# Patient Record
Sex: MALE | Race: WHITE | NOT HISPANIC OR LATINO | Employment: FULL TIME | URBAN - METROPOLITAN AREA
[De-identification: names, ages, dates, MRNs, and addresses within clinical notes are randomized per-mention and may not be internally consistent; named-entity substitution may affect disease eponyms.]

---

## 2017-04-03 ENCOUNTER — GENERIC CONVERSION - ENCOUNTER (OUTPATIENT)
Dept: OTHER | Facility: OTHER | Age: 47
End: 2017-04-03

## 2017-04-25 ENCOUNTER — GENERIC CONVERSION - ENCOUNTER (OUTPATIENT)
Dept: OTHER | Facility: OTHER | Age: 47
End: 2017-04-25

## 2017-05-09 DIAGNOSIS — Z00.00 ENCOUNTER FOR GENERAL ADULT MEDICAL EXAMINATION WITHOUT ABNORMAL FINDINGS: ICD-10-CM

## 2017-05-10 ENCOUNTER — APPOINTMENT (OUTPATIENT)
Dept: LAB | Age: 47
End: 2017-05-10
Payer: COMMERCIAL

## 2017-05-10 ENCOUNTER — TRANSCRIBE ORDERS (OUTPATIENT)
Dept: ADMINISTRATIVE | Age: 47
End: 2017-05-10

## 2017-05-10 DIAGNOSIS — C82.48 FOLLICULAR LYMPHOMA GRADE IIIB OF LYMPH NODES OF MULTIPLE SITES (HCC): Primary | ICD-10-CM

## 2017-05-10 DIAGNOSIS — Z00.00 ENCOUNTER FOR GENERAL ADULT MEDICAL EXAMINATION WITHOUT ABNORMAL FINDINGS: ICD-10-CM

## 2017-05-10 LAB
BASOPHILS # BLD AUTO: 0.04 THOUSANDS/ΜL (ref 0–0.1)
BASOPHILS NFR BLD AUTO: 1 % (ref 0–1)
EOSINOPHIL # BLD AUTO: 0.13 THOUSAND/ΜL (ref 0–0.61)
EOSINOPHIL NFR BLD AUTO: 2 % (ref 0–6)
ERYTHROCYTE [DISTWIDTH] IN BLOOD BY AUTOMATED COUNT: 13.8 % (ref 11.6–15.1)
HCT VFR BLD AUTO: 43.4 % (ref 36.5–49.3)
HGB BLD-MCNC: 14.6 G/DL (ref 12–17)
IGA SERPL-MCNC: 178 MG/DL (ref 70–400)
IGG SERPL-MCNC: 1280 MG/DL (ref 700–1600)
IGM SERPL-MCNC: 51 MG/DL (ref 40–230)
LDH SERPL-CCNC: 166 U/L (ref 81–234)
LYMPHOCYTES # BLD AUTO: 2.21 THOUSANDS/ΜL (ref 0.6–4.47)
LYMPHOCYTES NFR BLD AUTO: 33 % (ref 14–44)
MCH RBC QN AUTO: 28.6 PG (ref 26.8–34.3)
MCHC RBC AUTO-ENTMCNC: 33.6 G/DL (ref 31.4–37.4)
MCV RBC AUTO: 85 FL (ref 82–98)
MONOCYTES # BLD AUTO: 0.85 THOUSAND/ΜL (ref 0.17–1.22)
MONOCYTES NFR BLD AUTO: 13 % (ref 4–12)
NEUTROPHILS # BLD AUTO: 3.36 THOUSANDS/ΜL (ref 1.85–7.62)
NEUTS SEG NFR BLD AUTO: 51 % (ref 43–75)
NRBC BLD AUTO-RTO: 0 /100 WBCS
PLATELET # BLD AUTO: 143 THOUSANDS/UL (ref 149–390)
PMV BLD AUTO: 10.8 FL (ref 8.9–12.7)
RBC # BLD AUTO: 5.11 MILLION/UL (ref 3.88–5.62)
WBC # BLD AUTO: 6.62 THOUSAND/UL (ref 4.31–10.16)

## 2017-05-10 PROCEDURE — 83615 LACTATE (LD) (LDH) ENZYME: CPT

## 2017-05-10 PROCEDURE — 85025 COMPLETE CBC W/AUTO DIFF WBC: CPT

## 2017-05-10 PROCEDURE — 84165 PROTEIN E-PHORESIS SERUM: CPT

## 2017-05-10 PROCEDURE — 83883 ASSAY NEPHELOMETRY NOT SPEC: CPT

## 2017-05-10 PROCEDURE — 36415 COLL VENOUS BLD VENIPUNCTURE: CPT

## 2017-05-10 PROCEDURE — 82784 ASSAY IGA/IGD/IGG/IGM EACH: CPT

## 2017-05-12 LAB
ALBUMIN SERPL ELPH-MCNC: 4.37 G/DL (ref 3.5–5)
ALBUMIN SERPL ELPH-MCNC: 59 % (ref 52–65)
ALPHA1 GLOB SERPL ELPH-MCNC: 0.41 G/DL (ref 0.1–0.4)
ALPHA1 GLOB SERPL ELPH-MCNC: 5.5 % (ref 2.5–5)
ALPHA2 GLOB SERPL ELPH-MCNC: 0.63 G/DL (ref 0.4–1.2)
ALPHA2 GLOB SERPL ELPH-MCNC: 8.5 % (ref 7–13)
BETA GLOB ABNORMAL SERPL ELPH-MCNC: 0.48 G/DL (ref 0.4–0.8)
BETA1 GLOB SERPL ELPH-MCNC: 6.5 % (ref 5–13)
BETA2 GLOB SERPL ELPH-MCNC: 4.8 % (ref 2–8)
BETA2+GAMMA GLOB SERPL ELPH-MCNC: 0.36 G/DL (ref 0.2–0.5)
GAMMA GLOB ABNORMAL SERPL ELPH-MCNC: 1.16 G/DL (ref 0.5–1.6)
GAMMA GLOB SERPL ELPH-MCNC: 15.7 % (ref 12–22)
IGG/ALB SER: 1.44 {RATIO} (ref 1.1–1.8)
KAPPA LC FREE SER-MCNC: 78.58 MG/L (ref 3.3–19.4)
KAPPA LC FREE/LAMBDA FREE SER: 4.45 {RATIO} (ref 0.26–1.65)
LAMBDA LC FREE SERPL-MCNC: 17.65 MG/L (ref 5.71–26.3)
PROT PATTERN SERPL ELPH-IMP: ABNORMAL
PROT SERPL-MCNC: 7.4 G/DL (ref 6.4–8.2)

## 2017-05-16 ENCOUNTER — GENERIC CONVERSION - ENCOUNTER (OUTPATIENT)
Dept: OTHER | Facility: OTHER | Age: 47
End: 2017-05-16

## 2017-05-18 ENCOUNTER — HOSPITAL ENCOUNTER (OUTPATIENT)
Dept: CT IMAGING | Facility: HOSPITAL | Age: 47
Discharge: HOME | End: 2017-05-18
Attending: INTERNAL MEDICINE
Payer: COMMERCIAL

## 2017-05-18 VITALS
OXYGEN SATURATION: 95 % | HEART RATE: 80 BPM | DIASTOLIC BLOOD PRESSURE: 76 MMHG | RESPIRATION RATE: 18 BRPM | HEIGHT: 72 IN | BODY MASS INDEX: 28.17 KG/M2 | WEIGHT: 208 LBS | SYSTOLIC BLOOD PRESSURE: 121 MMHG | TEMPERATURE: 98.1 F

## 2017-05-18 DIAGNOSIS — C85.90 NON HODGKIN'S LYMPHOMA (HCC): ICD-10-CM

## 2017-05-18 PROCEDURE — 88185 FLOWCYTOMETRY/TC ADD-ON: CPT

## 2017-05-18 PROCEDURE — 88342 IMHCHEM/IMCYTCHM 1ST ANTB: CPT | Performed by: INTERNAL MEDICINE

## 2017-05-18 PROCEDURE — 88368 INSITU HYBRIDIZATION MANUAL: CPT | Performed by: INTERNAL MEDICINE

## 2017-05-18 PROCEDURE — 88184 FLOWCYTOMETRY/ TC 1 MARKER: CPT | Performed by: PATHOLOGY

## 2017-05-18 PROCEDURE — 99153 MOD SED SAME PHYS/QHP EA: CPT

## 2017-05-18 PROCEDURE — 49180 BIOPSY ABDOMINAL MASS: CPT

## 2017-05-18 PROCEDURE — 88305 TISSUE EXAM BY PATHOLOGIST: CPT | Performed by: INTERNAL MEDICINE

## 2017-05-18 PROCEDURE — 88341 IMHCHEM/IMCYTCHM EA ADD ANTB: CPT | Performed by: INTERNAL MEDICINE

## 2017-05-18 PROCEDURE — 88365 INSITU HYBRIDIZATION (FISH): CPT | Performed by: INTERNAL MEDICINE

## 2017-05-18 PROCEDURE — 88333 PATH CONSLTJ SURG CYTO XM 1: CPT | Performed by: INTERNAL MEDICINE

## 2017-05-18 PROCEDURE — 99152 MOD SED SAME PHYS/QHP 5/>YRS: CPT

## 2017-05-18 RX ORDER — FENTANYL CITRATE 50 UG/ML
INJECTION, SOLUTION INTRAMUSCULAR; INTRAVENOUS CODE/TRAUMA/SEDATION MEDICATION
Status: COMPLETED | OUTPATIENT
Start: 2017-05-18 | End: 2017-05-18

## 2017-05-18 RX ORDER — SODIUM CHLORIDE 9 MG/ML
75 INJECTION, SOLUTION INTRAVENOUS CONTINUOUS
Status: DISCONTINUED | OUTPATIENT
Start: 2017-05-18 | End: 2017-05-19 | Stop reason: HOSPADM

## 2017-05-18 RX ORDER — MIDAZOLAM HYDROCHLORIDE 1 MG/ML
INJECTION INTRAMUSCULAR; INTRAVENOUS CODE/TRAUMA/SEDATION MEDICATION
Status: COMPLETED | OUTPATIENT
Start: 2017-05-18 | End: 2017-05-18

## 2017-05-18 RX ADMIN — SODIUM CHLORIDE 75 ML/HR: 0.9 INJECTION, SOLUTION INTRAVENOUS at 12:15

## 2017-05-18 RX ADMIN — FENTANYL CITRATE 50 MCG: 50 INJECTION INTRAMUSCULAR; INTRAVENOUS at 14:09

## 2017-05-18 RX ADMIN — MIDAZOLAM 1 MG: 1 INJECTION INTRAMUSCULAR; INTRAVENOUS at 14:08

## 2017-05-19 ENCOUNTER — GENERIC CONVERSION - ENCOUNTER (OUTPATIENT)
Dept: OTHER | Facility: OTHER | Age: 47
End: 2017-05-19

## 2017-05-22 LAB — SCAN RESULT: NORMAL

## 2017-05-23 ENCOUNTER — GENERIC CONVERSION - ENCOUNTER (OUTPATIENT)
Dept: OTHER | Facility: OTHER | Age: 47
End: 2017-05-23

## 2017-05-24 ENCOUNTER — GENERIC CONVERSION - ENCOUNTER (OUTPATIENT)
Dept: OTHER | Facility: OTHER | Age: 47
End: 2017-05-24

## 2017-05-26 ENCOUNTER — GENERIC CONVERSION - ENCOUNTER (OUTPATIENT)
Dept: OTHER | Facility: OTHER | Age: 47
End: 2017-05-26

## 2017-05-30 ENCOUNTER — ALLSCRIPTS OFFICE VISIT (OUTPATIENT)
Dept: OTHER | Facility: OTHER | Age: 47
End: 2017-05-30

## 2017-05-31 ENCOUNTER — GENERIC CONVERSION - ENCOUNTER (OUTPATIENT)
Dept: OTHER | Facility: OTHER | Age: 47
End: 2017-05-31

## 2017-06-02 ENCOUNTER — LAB REQUISITION (OUTPATIENT)
Dept: LAB | Facility: HOSPITAL | Age: 47
End: 2017-06-02
Payer: COMMERCIAL

## 2017-06-02 ENCOUNTER — ALLSCRIPTS OFFICE VISIT (OUTPATIENT)
Dept: OTHER | Facility: OTHER | Age: 47
End: 2017-06-02

## 2017-06-02 DIAGNOSIS — L73.9 FOLLICULAR DISORDER: ICD-10-CM

## 2017-06-02 PROCEDURE — 88342 IMHCHEM/IMCYTCHM 1ST ANTB: CPT | Performed by: INTERNAL MEDICINE

## 2017-06-02 PROCEDURE — 88313 SPECIAL STAINS GROUP 2: CPT | Performed by: INTERNAL MEDICINE

## 2017-06-02 PROCEDURE — 88305 TISSUE EXAM BY PATHOLOGIST: CPT | Performed by: INTERNAL MEDICINE

## 2017-06-02 PROCEDURE — 85097 BONE MARROW INTERPRETATION: CPT | Performed by: INTERNAL MEDICINE

## 2017-06-02 PROCEDURE — 88341 IMHCHEM/IMCYTCHM EA ADD ANTB: CPT | Performed by: INTERNAL MEDICINE

## 2017-06-02 PROCEDURE — 88365 INSITU HYBRIDIZATION (FISH): CPT | Performed by: INTERNAL MEDICINE

## 2017-06-02 PROCEDURE — 88311 DECALCIFY TISSUE: CPT | Performed by: INTERNAL MEDICINE

## 2017-06-06 ENCOUNTER — HOSPITAL ENCOUNTER (OUTPATIENT)
Dept: RADIOLOGY | Age: 47
Discharge: HOME | End: 2017-06-06
Payer: COMMERCIAL

## 2017-06-06 DIAGNOSIS — C82.48 FOLLICULAR LYMPHOMA GRADE IIIB OF LYMPH NODES OF MULTIPLE SITES (HCC): ICD-10-CM

## 2017-06-06 LAB — GLUCOSE SERPL-MCNC: 80 MG/DL (ref 65–140)

## 2017-06-06 PROCEDURE — 82948 REAGENT STRIP/BLOOD GLUCOSE: CPT

## 2017-06-06 PROCEDURE — A9552 F18 FDG: HCPCS

## 2017-06-06 PROCEDURE — 78815 PET IMAGE W/CT SKULL-THIGH: CPT

## 2017-06-06 RX ADMIN — IOHEXOL 5 ML: 240 INJECTION, SOLUTION INTRATHECAL; INTRAVASCULAR; INTRAVENOUS; ORAL at 13:19

## 2017-06-07 ENCOUNTER — HOSPITAL ENCOUNTER (OUTPATIENT)
Dept: NON INVASIVE DIAGNOSTICS | Facility: HOSPITAL | Age: 47
Discharge: HOME | End: 2017-06-07
Attending: INTERNAL MEDICINE
Payer: COMMERCIAL

## 2017-06-07 DIAGNOSIS — C82.48 FOLLICULAR LYMPHOMA GRADE IIIB OF LYMPH NODES OF MULTIPLE SITES (HCC): ICD-10-CM

## 2017-06-07 PROCEDURE — 93306 TTE W/DOPPLER COMPLETE: CPT

## 2017-06-08 ENCOUNTER — GENERIC CONVERSION - ENCOUNTER (OUTPATIENT)
Dept: OTHER | Facility: OTHER | Age: 47
End: 2017-06-08

## 2017-06-09 ENCOUNTER — APPOINTMENT (OUTPATIENT)
Dept: LAB | Facility: MEDICAL CENTER | Age: 47
End: 2017-06-09
Payer: COMMERCIAL

## 2017-06-09 ENCOUNTER — TRANSCRIBE ORDERS (OUTPATIENT)
Dept: ADMINISTRATIVE | Facility: HOSPITAL | Age: 47
End: 2017-06-09

## 2017-06-09 DIAGNOSIS — C82.48 FOLLICULAR LYMPHOMA GRADE IIIB OF LYMPH NODES OF MULTIPLE SITES (HCC): ICD-10-CM

## 2017-06-09 LAB
ALBUMIN SERPL BCP-MCNC: 3.9 G/DL (ref 3.5–5)
ALP SERPL-CCNC: 177 U/L (ref 46–116)
ALT SERPL W P-5'-P-CCNC: 48 U/L (ref 12–78)
ANION GAP SERPL CALCULATED.3IONS-SCNC: 9 MMOL/L (ref 4–13)
AST SERPL W P-5'-P-CCNC: 33 U/L (ref 5–45)
BASOPHILS # BLD AUTO: 0.02 THOUSANDS/ΜL (ref 0–0.1)
BASOPHILS NFR BLD AUTO: 0 % (ref 0–1)
BILIRUB SERPL-MCNC: 0.63 MG/DL (ref 0.2–1)
BUN SERPL-MCNC: 15 MG/DL (ref 5–25)
CALCIUM SERPL-MCNC: 9.4 MG/DL (ref 8.3–10.1)
CHLORIDE SERPL-SCNC: 106 MMOL/L (ref 100–108)
CO2 SERPL-SCNC: 26 MMOL/L (ref 21–32)
CREAT SERPL-MCNC: 0.98 MG/DL (ref 0.6–1.3)
EOSINOPHIL # BLD AUTO: 0.01 THOUSAND/ΜL (ref 0–0.61)
EOSINOPHIL NFR BLD AUTO: 0 % (ref 0–6)
ERYTHROCYTE [DISTWIDTH] IN BLOOD BY AUTOMATED COUNT: 13.7 % (ref 11.6–15.1)
GFR SERPL CREATININE-BSD FRML MDRD: >60 ML/MIN/1.73SQ M
GLUCOSE SERPL-MCNC: 143 MG/DL (ref 65–140)
HCT VFR BLD AUTO: 43.1 % (ref 36.5–49.3)
HGB BLD-MCNC: 14.7 G/DL (ref 12–17)
LYMPHOCYTES # BLD AUTO: 2.1 THOUSANDS/ΜL (ref 0.6–4.47)
LYMPHOCYTES NFR BLD AUTO: 28 % (ref 14–44)
MCH RBC QN AUTO: 28.5 PG (ref 26.8–34.3)
MCHC RBC AUTO-ENTMCNC: 34.1 G/DL (ref 31.4–37.4)
MCV RBC AUTO: 84 FL (ref 82–98)
MONOCYTES # BLD AUTO: 0.56 THOUSAND/ΜL (ref 0.17–1.22)
MONOCYTES NFR BLD AUTO: 8 % (ref 4–12)
NEUTROPHILS # BLD AUTO: 4.78 THOUSANDS/ΜL (ref 1.85–7.62)
NEUTS SEG NFR BLD AUTO: 64 % (ref 43–75)
NRBC BLD AUTO-RTO: 0 /100 WBCS
PLATELET # BLD AUTO: 172 THOUSANDS/UL (ref 149–390)
PMV BLD AUTO: 10.9 FL (ref 8.9–12.7)
POTASSIUM SERPL-SCNC: 4.3 MMOL/L (ref 3.5–5.3)
PROT SERPL-MCNC: 7.8 G/DL (ref 6.4–8.2)
RBC # BLD AUTO: 5.15 MILLION/UL (ref 3.88–5.62)
SODIUM SERPL-SCNC: 141 MMOL/L (ref 136–145)
WBC # BLD AUTO: 7.5 THOUSAND/UL (ref 4.31–10.16)

## 2017-06-09 PROCEDURE — 80053 COMPREHEN METABOLIC PANEL: CPT

## 2017-06-09 PROCEDURE — 85025 COMPLETE CBC W/AUTO DIFF WBC: CPT

## 2017-06-09 PROCEDURE — 36415 COLL VENOUS BLD VENIPUNCTURE: CPT

## 2017-06-09 RX ORDER — SODIUM CHLORIDE 9 MG/ML
20 INJECTION, SOLUTION INTRAVENOUS CONTINUOUS
Status: DISCONTINUED | OUTPATIENT
Start: 2017-06-12 | End: 2017-06-15 | Stop reason: HOSPADM

## 2017-06-09 RX ORDER — ACETAMINOPHEN 325 MG/1
650 TABLET ORAL ONCE
Status: COMPLETED | OUTPATIENT
Start: 2017-06-12 | End: 2017-06-12

## 2017-06-09 RX ORDER — DOXORUBICIN HYDROCHLORIDE 2 MG/ML
107 INJECTION, SOLUTION INTRAVENOUS ONCE
Status: DISCONTINUED | OUTPATIENT
Start: 2017-06-12 | End: 2017-06-12

## 2017-06-12 ENCOUNTER — HOSPITAL ENCOUNTER (OUTPATIENT)
Dept: INFUSION CENTER | Facility: CLINIC | Age: 47
Discharge: HOME | End: 2017-06-12
Payer: COMMERCIAL

## 2017-06-12 VITALS
TEMPERATURE: 97 F | SYSTOLIC BLOOD PRESSURE: 107 MMHG | HEART RATE: 72 BPM | DIASTOLIC BLOOD PRESSURE: 64 MMHG | BODY MASS INDEX: 25.86 KG/M2 | OXYGEN SATURATION: 95 % | HEIGHT: 73 IN | WEIGHT: 195.11 LBS | RESPIRATION RATE: 18 BRPM

## 2017-06-12 PROCEDURE — 96361 HYDRATE IV INFUSION ADD-ON: CPT

## 2017-06-12 PROCEDURE — 96413 CHEMO IV INFUSION 1 HR: CPT

## 2017-06-12 PROCEDURE — 96376 TX/PRO/DX INJ SAME DRUG ADON: CPT

## 2017-06-12 PROCEDURE — 96415 CHEMO IV INFUSION ADDL HR: CPT

## 2017-06-12 PROCEDURE — 96375 TX/PRO/DX INJ NEW DRUG ADDON: CPT

## 2017-06-12 PROCEDURE — 96367 TX/PROPH/DG ADDL SEQ IV INF: CPT

## 2017-06-12 RX ORDER — PREDNISONE 50 MG/1
100 TABLET ORAL DAILY
Status: ON HOLD | COMMUNITY
End: 2017-06-21 | Stop reason: ALTCHOICE

## 2017-06-12 RX ORDER — ALLOPURINOL 100 MG/1
100 TABLET ORAL DAILY
COMMUNITY
End: 2018-01-31 | Stop reason: CLARIF

## 2017-06-12 RX ORDER — DIPHENHYDRAMINE HYDROCHLORIDE 50 MG/ML
50 INJECTION INTRAMUSCULAR; INTRAVENOUS ONCE
Status: COMPLETED | OUTPATIENT
Start: 2017-06-12 | End: 2017-06-12

## 2017-06-12 RX ORDER — DOXORUBICIN HYDROCHLORIDE 2 MG/ML
107 INJECTION, SOLUTION INTRAVENOUS ONCE
Status: COMPLETED | OUTPATIENT
Start: 2017-06-13 | End: 2017-06-13

## 2017-06-12 RX ORDER — SODIUM CHLORIDE 9 MG/ML
20 INJECTION, SOLUTION INTRAVENOUS ONCE
Status: COMPLETED | OUTPATIENT
Start: 2017-06-13 | End: 2017-06-13

## 2017-06-12 RX ADMIN — SODIUM CHLORIDE 500 ML: 0.9 INJECTION, SOLUTION INTRAVENOUS at 11:30

## 2017-06-12 RX ADMIN — ACETAMINOPHEN 650 MG: 325 TABLET, FILM COATED ORAL at 08:31

## 2017-06-12 RX ADMIN — DEXAMETHASONE SODIUM PHOSPHATE: 10 INJECTION, SOLUTION INTRAMUSCULAR; INTRAVENOUS at 08:25

## 2017-06-12 RX ADMIN — SODIUM CHLORIDE 20 ML/HR: 0.9 INJECTION, SOLUTION INTRAVENOUS at 08:24

## 2017-06-12 RX ADMIN — HYDROCORTISONE SODIUM SUCCINATE 100 MG: 100 INJECTION, POWDER, FOR SOLUTION INTRAMUSCULAR; INTRAVENOUS at 11:32

## 2017-06-12 RX ADMIN — RITUXIMAB 800 MG: 10 INJECTION, SOLUTION INTRAVENOUS at 09:24

## 2017-06-12 RX ADMIN — FAMOTIDINE 20 MG: 10 INJECTION, SOLUTION INTRAVENOUS at 11:34

## 2017-06-12 RX ADMIN — DIPHENHYDRAMINE HYDROCHLORIDE 25 MG: 50 INJECTION, SOLUTION INTRAMUSCULAR; INTRAVENOUS at 08:49

## 2017-06-12 RX ADMIN — DIPHENHYDRAMINE HYDROCHLORIDE 50 MG: 50 INJECTION, SOLUTION INTRAMUSCULAR; INTRAVENOUS at 11:31

## 2017-06-12 NOTE — PROGRESS NOTES
Pt's Rituxan infusion complete  No further adverse reactions noted  Pt left unit in stable condition without question or concern  Pt aware of his appt tomorrow for day 2 chemo at [facility] infusion center

## 2017-06-12 NOTE — PROGRESS NOTES
Pt arrived to unit without complaint, received all ordered premeds for Rituxan without incident  Rituxan initiated at 0924 at 50mg/hr to be titrated per protocol  Pt tolerated well until 1129 (Rituxan running at 200mg/hr) when pt c/o itchy scalp and R side of face  Upon inspection, pt noted to have hive just lateral to R eye, and diffuse hives over back  Pt admitted to mild pruritis of back  Rituxan stopped and HSR protocol initiated  All meds and IV hydration admininstered per protocol (see MAR)  Pt's admitted to total relief of itchy scalp and hive on face resolved within 6minutes  After 30minutes, pt denied all symptoms of HSR, few faint hives persisted on his back  At 1215, Dr Aida Ennis notified via Yoselin Schwab RN of preceding events and assessments  Order given to proceed with Rituxan infusion starting at 50mg/hr and titrating per protocol  Rituxan restarted at 1220  Pt tolerated well, was sound asleep until 1304 when he awoke with a startle and felt a numbness and tingling of his right hand and he also complained of feeling flushed  Again, Rituxan stopped and pt assessed  Pt denied any feeling of itching  No hives present, all hives on back resolved  VSS  Pt stated then that his hand felt fine and he was no longer feeling hot  No HSR meds indicated  Rituxan again restarted  Pt has been tolerating well ever since without any s/s HSR

## 2017-06-13 ENCOUNTER — HOSPITAL ENCOUNTER (OUTPATIENT)
Dept: INFUSION CENTER | Facility: HOSPITAL | Age: 47
Discharge: HOME | End: 2017-06-13
Payer: COMMERCIAL

## 2017-06-13 VITALS
DIASTOLIC BLOOD PRESSURE: 75 MMHG | OXYGEN SATURATION: 95 % | SYSTOLIC BLOOD PRESSURE: 122 MMHG | TEMPERATURE: 98.4 F | WEIGHT: 197.75 LBS | HEIGHT: 73 IN | BODY MASS INDEX: 26.21 KG/M2 | HEART RATE: 70 BPM | RESPIRATION RATE: 18 BRPM

## 2017-06-13 PROCEDURE — 96367 TX/PROPH/DG ADDL SEQ IV INF: CPT

## 2017-06-13 PROCEDURE — 96417 CHEMO IV INFUS EACH ADDL SEQ: CPT

## 2017-06-13 PROCEDURE — 96411 CHEMO IV PUSH ADDL DRUG: CPT

## 2017-06-13 PROCEDURE — 96413 CHEMO IV INFUSION 1 HR: CPT

## 2017-06-13 RX ADMIN — VINCRISTINE SULFATE 2 MG: 1 INJECTION, SOLUTION INTRAVENOUS at 15:00

## 2017-06-13 RX ADMIN — DEXAMETHASONE SODIUM PHOSPHATE: 10 INJECTION, SOLUTION INTRAMUSCULAR; INTRAVENOUS at 12:30

## 2017-06-13 RX ADMIN — CYCLOPHOSPHAMIDE 1605 MG: 1 INJECTION, POWDER, FOR SOLUTION INTRAVENOUS; ORAL at 13:40

## 2017-06-13 RX ADMIN — DOXORUBICIN HYDROCHLORIDE 107 MG: 2 INJECTION, SOLUTION INTRAVENOUS at 14:35

## 2017-06-13 RX ADMIN — SODIUM CHLORIDE 150 MG: 0.9 INJECTION, SOLUTION INTRAVENOUS at 13:00

## 2017-06-13 RX ADMIN — SODIUM CHLORIDE 20 ML/HR: 0.9 INJECTION, SOLUTION INTRAVENOUS at 12:30

## 2017-06-13 NOTE — PROGRESS NOTES
Pt complained of some tightness/pressure in his chest when he takes a deep breath  No shortness of breath or chest pain, just feeling of pressure with deep inspiration  Vitals and O2 sat stable  Spoke with Eryn Carlos, she and Dr Tona Jensen spoke with pt about it today, instructed him to use Motrin for pain as needed, call if it gets worse or persists  Will proceed with treatment as ordered  Clarified doses with Forrest, standard R CHOP dosing  Rituxan 375mg/m2  Cytoxan 750mg/m2  Adriamycin 50 mg/m2  Vincristine 1 4mg/m2  Every 21 days

## 2017-06-19 ENCOUNTER — APPOINTMENT (OUTPATIENT)
Dept: LAB | Age: 47
End: 2017-06-19
Payer: COMMERCIAL

## 2017-06-19 ENCOUNTER — TELEPHONE (OUTPATIENT)
Dept: RADIOLOGY | Facility: HOSPITAL | Age: 47
End: 2017-06-19

## 2017-06-19 DIAGNOSIS — C82.48 FOLLICULAR LYMPHOMA GRADE IIIB OF LYMPH NODES OF MULTIPLE SITES (HCC): ICD-10-CM

## 2017-06-19 LAB
BASOPHILS # BLD AUTO: 0.02 THOUSANDS/ΜL (ref 0–0.1)
BASOPHILS NFR BLD AUTO: 0 % (ref 0–1)
EOSINOPHIL # BLD AUTO: 0.12 THOUSAND/ΜL (ref 0–0.61)
EOSINOPHIL NFR BLD AUTO: 2 % (ref 0–6)
ERYTHROCYTE [DISTWIDTH] IN BLOOD BY AUTOMATED COUNT: 13.4 % (ref 11.6–15.1)
HCT VFR BLD AUTO: 40.9 % (ref 36.5–49.3)
HGB BLD-MCNC: 13.9 G/DL (ref 12–17)
LYMPHOCYTES # BLD AUTO: 0.69 THOUSANDS/ΜL (ref 0.6–4.47)
LYMPHOCYTES NFR BLD AUTO: 11 % (ref 14–44)
MCH RBC QN AUTO: 28.1 PG (ref 26.8–34.3)
MCHC RBC AUTO-ENTMCNC: 34 G/DL (ref 31.4–37.4)
MCV RBC AUTO: 83 FL (ref 82–98)
MONOCYTES # BLD AUTO: 0.13 THOUSAND/ΜL (ref 0.17–1.22)
MONOCYTES NFR BLD AUTO: 2 % (ref 4–12)
NEUTROPHILS # BLD AUTO: 5.15 THOUSANDS/ΜL (ref 1.85–7.62)
NEUTS SEG NFR BLD AUTO: 85 % (ref 43–75)
NRBC BLD AUTO-RTO: 0 /100 WBCS
PLATELET # BLD AUTO: 140 THOUSANDS/UL (ref 149–390)
PMV BLD AUTO: 10.4 FL (ref 8.9–12.7)
RBC # BLD AUTO: 4.95 MILLION/UL (ref 3.88–5.62)
URATE SERPL-MCNC: 4.5 MG/DL (ref 4.2–8)
WBC # BLD AUTO: 6.14 THOUSAND/UL (ref 4.31–10.16)

## 2017-06-19 PROCEDURE — 84550 ASSAY OF BLOOD/URIC ACID: CPT

## 2017-06-19 PROCEDURE — 36415 COLL VENOUS BLD VENIPUNCTURE: CPT

## 2017-06-19 PROCEDURE — 85025 COMPLETE CBC W/AUTO DIFF WBC: CPT

## 2017-06-19 RX ORDER — SODIUM CHLORIDE 9 MG/ML
75 INJECTION, SOLUTION INTRAVENOUS CONTINUOUS
Status: CANCELLED | OUTPATIENT
Start: 2017-06-19

## 2017-06-20 ENCOUNTER — TELEPHONE (OUTPATIENT)
Dept: SURGERY | Facility: HOSPITAL | Age: 47
End: 2017-06-20

## 2017-06-21 ENCOUNTER — HOSPITAL ENCOUNTER (OUTPATIENT)
Dept: RADIOLOGY | Facility: HOSPITAL | Age: 47
Discharge: HOME | End: 2017-06-21
Attending: INTERNAL MEDICINE | Admitting: RADIOLOGY
Payer: COMMERCIAL

## 2017-06-21 VITALS
RESPIRATION RATE: 18 BRPM | TEMPERATURE: 97.6 F | OXYGEN SATURATION: 94 % | SYSTOLIC BLOOD PRESSURE: 105 MMHG | DIASTOLIC BLOOD PRESSURE: 59 MMHG | HEART RATE: 82 BPM

## 2017-06-21 DIAGNOSIS — C82.48 FOLLICULAR LYMPHOMA GRADE IIIB OF LYMPH NODES OF MULTIPLE SITES (HCC): ICD-10-CM

## 2017-06-21 PROCEDURE — 99152 MOD SED SAME PHYS/QHP 5/>YRS: CPT

## 2017-06-21 PROCEDURE — 76937 US GUIDE VASCULAR ACCESS: CPT

## 2017-06-21 PROCEDURE — C1788 PORT, INDWELLING, IMP: HCPCS

## 2017-06-21 PROCEDURE — 99153 MOD SED SAME PHYS/QHP EA: CPT

## 2017-06-21 PROCEDURE — 77001 FLUOROGUIDE FOR VEIN DEVICE: CPT

## 2017-06-21 PROCEDURE — 36561 INSERT TUNNELED CV CATH: CPT

## 2017-06-21 RX ORDER — HYDROCODONE BITARTRATE AND ACETAMINOPHEN 5; 325 MG/1; MG/1
1 TABLET ORAL EVERY 6 HOURS PRN
Status: DISCONTINUED | OUTPATIENT
Start: 2017-06-21 | End: 2017-06-21 | Stop reason: HOSPADM

## 2017-06-21 RX ORDER — FENTANYL CITRATE 50 UG/ML
INJECTION, SOLUTION INTRAMUSCULAR; INTRAVENOUS CODE/TRAUMA/SEDATION MEDICATION
Status: COMPLETED | OUTPATIENT
Start: 2017-06-21 | End: 2017-06-21

## 2017-06-21 RX ORDER — SODIUM CHLORIDE 9 MG/ML
75 INJECTION, SOLUTION INTRAVENOUS CONTINUOUS
Status: DISCONTINUED | OUTPATIENT
Start: 2017-06-21 | End: 2017-06-21 | Stop reason: HOSPADM

## 2017-06-21 RX ORDER — MIDAZOLAM HYDROCHLORIDE 1 MG/ML
INJECTION INTRAMUSCULAR; INTRAVENOUS CODE/TRAUMA/SEDATION MEDICATION
Status: COMPLETED | OUTPATIENT
Start: 2017-06-21 | End: 2017-06-21

## 2017-06-21 RX ADMIN — MIDAZOLAM HYDROCHLORIDE 1 MG: 1 INJECTION, SOLUTION INTRAMUSCULAR; INTRAVENOUS at 11:42

## 2017-06-21 RX ADMIN — FENTANYL CITRATE 50 MCG: 50 INJECTION, SOLUTION INTRAMUSCULAR; INTRAVENOUS at 11:33

## 2017-06-21 RX ADMIN — MIDAZOLAM HYDROCHLORIDE 1 MG: 1 INJECTION, SOLUTION INTRAMUSCULAR; INTRAVENOUS at 11:33

## 2017-06-21 RX ADMIN — FENTANYL CITRATE 50 MCG: 50 INJECTION, SOLUTION INTRAMUSCULAR; INTRAVENOUS at 11:42

## 2017-07-03 ENCOUNTER — APPOINTMENT (OUTPATIENT)
Dept: LAB | Age: 47
End: 2017-07-03
Payer: COMMERCIAL

## 2017-07-03 ENCOUNTER — TRANSCRIBE ORDERS (OUTPATIENT)
Dept: ADMINISTRATIVE | Age: 47
End: 2017-07-03

## 2017-07-03 DIAGNOSIS — C82.48 FOLLICULAR LYMPHOMA GRADE IIIB OF LYMPH NODES OF MULTIPLE SITES (HCC): ICD-10-CM

## 2017-07-03 LAB
ALBUMIN SERPL BCP-MCNC: 3.7 G/DL (ref 3.5–5)
ALP SERPL-CCNC: 147 U/L (ref 46–116)
ALT SERPL W P-5'-P-CCNC: 42 U/L (ref 12–78)
ANION GAP SERPL CALCULATED.3IONS-SCNC: 5 MMOL/L (ref 4–13)
AST SERPL W P-5'-P-CCNC: 25 U/L (ref 5–45)
BASOPHILS # BLD AUTO: 0.08 THOUSANDS/ΜL (ref 0–0.1)
BASOPHILS NFR BLD AUTO: 2 % (ref 0–1)
BILIRUB SERPL-MCNC: 0.72 MG/DL (ref 0.2–1)
BUN SERPL-MCNC: 12 MG/DL (ref 5–25)
CALCIUM SERPL-MCNC: 8.9 MG/DL (ref 8.3–10.1)
CHLORIDE SERPL-SCNC: 104 MMOL/L (ref 100–108)
CO2 SERPL-SCNC: 29 MMOL/L (ref 21–32)
CREAT SERPL-MCNC: 0.82 MG/DL (ref 0.6–1.3)
EOSINOPHIL # BLD AUTO: 0.04 THOUSAND/ΜL (ref 0–0.61)
EOSINOPHIL NFR BLD AUTO: 1 % (ref 0–6)
ERYTHROCYTE [DISTWIDTH] IN BLOOD BY AUTOMATED COUNT: 15 % (ref 11.6–15.1)
GFR SERPL CREATININE-BSD FRML MDRD: >60 ML/MIN/1.73SQ M
GLUCOSE SERPL-MCNC: 89 MG/DL (ref 65–140)
HCT VFR BLD AUTO: 38.6 % (ref 36.5–49.3)
HGB BLD-MCNC: 13.2 G/DL (ref 12–17)
LYMPHOCYTES # BLD AUTO: 1.09 THOUSANDS/ΜL (ref 0.6–4.47)
LYMPHOCYTES NFR BLD AUTO: 30 % (ref 14–44)
MCH RBC QN AUTO: 28.6 PG (ref 26.8–34.3)
MCHC RBC AUTO-ENTMCNC: 34.2 G/DL (ref 31.4–37.4)
MCV RBC AUTO: 84 FL (ref 82–98)
MONOCYTES # BLD AUTO: 0.74 THOUSAND/ΜL (ref 0.17–1.22)
MONOCYTES NFR BLD AUTO: 20 % (ref 4–12)
NEUTROPHILS # BLD AUTO: 1.61 THOUSANDS/ΜL (ref 1.85–7.62)
NEUTS SEG NFR BLD AUTO: 47 % (ref 43–75)
NRBC BLD AUTO-RTO: 0 /100 WBCS
PLATELET # BLD AUTO: 238 THOUSANDS/UL (ref 149–390)
PMV BLD AUTO: 9.8 FL (ref 8.9–12.7)
POTASSIUM SERPL-SCNC: 4.2 MMOL/L (ref 3.5–5.3)
PROT SERPL-MCNC: 6.9 G/DL (ref 6.4–8.2)
RBC # BLD AUTO: 4.61 MILLION/UL (ref 3.88–5.62)
SODIUM SERPL-SCNC: 138 MMOL/L (ref 136–145)
WBC # BLD AUTO: 3.62 THOUSAND/UL (ref 4.31–10.16)

## 2017-07-03 PROCEDURE — 36415 COLL VENOUS BLD VENIPUNCTURE: CPT

## 2017-07-03 PROCEDURE — 85025 COMPLETE CBC W/AUTO DIFF WBC: CPT

## 2017-07-03 PROCEDURE — 80053 COMPREHEN METABOLIC PANEL: CPT

## 2017-07-05 RX ORDER — SODIUM CHLORIDE 9 MG/ML
20 INJECTION, SOLUTION INTRAVENOUS ONCE
Status: COMPLETED | OUTPATIENT
Start: 2017-07-06 | End: 2017-07-06

## 2017-07-05 RX ORDER — ACETAMINOPHEN 325 MG/1
650 TABLET ORAL ONCE
Status: COMPLETED | OUTPATIENT
Start: 2017-07-06 | End: 2017-07-06

## 2017-07-06 ENCOUNTER — HOSPITAL ENCOUNTER (OUTPATIENT)
Dept: INFUSION CENTER | Facility: HOSPITAL | Age: 47
Discharge: HOME | End: 2017-07-06
Payer: COMMERCIAL

## 2017-07-06 VITALS
HEART RATE: 72 BPM | SYSTOLIC BLOOD PRESSURE: 106 MMHG | BODY MASS INDEX: 25.62 KG/M2 | HEIGHT: 73 IN | RESPIRATION RATE: 18 BRPM | TEMPERATURE: 96.7 F | OXYGEN SATURATION: 99 % | DIASTOLIC BLOOD PRESSURE: 63 MMHG | WEIGHT: 193.34 LBS

## 2017-07-06 PROCEDURE — 96375 TX/PRO/DX INJ NEW DRUG ADDON: CPT

## 2017-07-06 PROCEDURE — 96367 TX/PROPH/DG ADDL SEQ IV INF: CPT

## 2017-07-06 PROCEDURE — 96376 TX/PRO/DX INJ SAME DRUG ADON: CPT

## 2017-07-06 PROCEDURE — 96413 CHEMO IV INFUSION 1 HR: CPT

## 2017-07-06 PROCEDURE — 96361 HYDRATE IV INFUSION ADD-ON: CPT

## 2017-07-06 PROCEDURE — 96415 CHEMO IV INFUSION ADDL HR: CPT

## 2017-07-06 RX ORDER — DIPHENHYDRAMINE HYDROCHLORIDE 50 MG/ML
25 INJECTION INTRAMUSCULAR; INTRAVENOUS
Status: ACTIVE | OUTPATIENT
Start: 2017-07-06 | End: 2017-07-06

## 2017-07-06 RX ORDER — SODIUM CHLORIDE 9 MG/ML
20 INJECTION, SOLUTION INTRAVENOUS ONCE
Status: DISCONTINUED | OUTPATIENT
Start: 2017-07-07 | End: 2017-07-06

## 2017-07-06 RX ORDER — DOXORUBICIN HYDROCHLORIDE 2 MG/ML
107 INJECTION, SOLUTION INTRAVENOUS ONCE
Status: DISCONTINUED | OUTPATIENT
Start: 2017-07-07 | End: 2017-07-06

## 2017-07-06 RX ORDER — DOXORUBICIN HYDROCHLORIDE 2 MG/ML
107 INJECTION, SOLUTION INTRAVENOUS ONCE
Status: DISCONTINUED | OUTPATIENT
Start: 2017-07-06 | End: 2017-07-09 | Stop reason: HOSPADM

## 2017-07-06 RX ORDER — DIPHENHYDRAMINE HYDROCHLORIDE 50 MG/ML
INJECTION INTRAMUSCULAR; INTRAVENOUS
Status: COMPLETED
Start: 2017-07-06 | End: 2017-07-06

## 2017-07-06 RX ORDER — DIPHENHYDRAMINE HYDROCHLORIDE 50 MG/ML
50 INJECTION INTRAMUSCULAR; INTRAVENOUS ONCE
Status: DISCONTINUED | OUTPATIENT
Start: 2017-07-06 | End: 2017-07-06 | Stop reason: SDUPTHER

## 2017-07-06 RX ADMIN — FAMOTIDINE 20 MG: 10 INJECTION, SOLUTION INTRAVENOUS at 11:52

## 2017-07-06 RX ADMIN — SODIUM CHLORIDE 20 ML/HR: 0.9 INJECTION, SOLUTION INTRAVENOUS at 09:11

## 2017-07-06 RX ADMIN — DIPHENHYDRAMINE HYDROCHLORIDE 25 MG: 50 INJECTION, SOLUTION INTRAMUSCULAR; INTRAVENOUS at 13:45

## 2017-07-06 RX ADMIN — Medication 300 UNITS: at 17:18

## 2017-07-06 RX ADMIN — HYDROCORTISONE SODIUM SUCCINATE 50 MG: 100 INJECTION, POWDER, FOR SOLUTION INTRAMUSCULAR; INTRAVENOUS at 13:45

## 2017-07-06 RX ADMIN — DIPHENHYDRAMINE HYDROCHLORIDE 25 MG: 50 INJECTION, SOLUTION INTRAMUSCULAR; INTRAVENOUS at 17:16

## 2017-07-06 RX ADMIN — ONDANSETRON 8 MG: 2 INJECTION INTRAMUSCULAR; INTRAVENOUS at 14:37

## 2017-07-06 RX ADMIN — DIPHENHYDRAMINE HYDROCHLORIDE 25 MG: 50 INJECTION, SOLUTION INTRAMUSCULAR; INTRAVENOUS at 09:11

## 2017-07-06 RX ADMIN — HYDROCORTISONE SODIUM SUCCINATE 100 MG: 100 INJECTION, POWDER, FOR SOLUTION INTRAMUSCULAR; INTRAVENOUS at 09:37

## 2017-07-06 RX ADMIN — FAMOTIDINE 20 MG: 10 INJECTION, SOLUTION INTRAVENOUS at 13:35

## 2017-07-06 RX ADMIN — DIPHENHYDRAMINE HYDROCHLORIDE 25 MG: 50 INJECTION, SOLUTION INTRAMUSCULAR; INTRAVENOUS at 11:51

## 2017-07-06 RX ADMIN — HYDROCORTISONE SODIUM SUCCINATE 100 MG: 100 INJECTION, POWDER, FOR SOLUTION INTRAMUSCULAR; INTRAVENOUS at 14:31

## 2017-07-06 RX ADMIN — RITUXIMAB 800 MG: 10 INJECTION, SOLUTION INTRAVENOUS at 10:39

## 2017-07-06 RX ADMIN — HYDROCORTISONE SODIUM SUCCINATE 50 MG: 100 INJECTION, POWDER, FOR SOLUTION INTRAMUSCULAR; INTRAVENOUS at 11:50

## 2017-07-06 RX ADMIN — SODIUM CHLORIDE 500 ML: 0.9 INJECTION, SOLUTION INTRAVENOUS at 13:49

## 2017-07-06 RX ADMIN — ACETAMINOPHEN 650 MG: 325 TABLET, FILM COATED ORAL at 09:36

## 2017-07-06 NOTE — PROGRESS NOTES
Spoke with Yasmeen Sosa RN/Dr Shabbir Abreu and per Dr Shabbir Abreu, given patient solucortef 100mg and zofran 8mg and restart Rituxan again at 100mL/hr  Patient agreeable

## 2017-07-06 NOTE — PROGRESS NOTES
Patient c/o itching on chest   Rituxan stopped  Spoke with Dr Marylou Madrigal, stop Rituxan and given Benadryl 25mg IVP  Patient left unit asymptomatic

## 2017-07-06 NOTE — PROGRESS NOTES
Patient was at the 150mL/hr rate with only 3mL given and patient started itching on his head  Patient had red blotches/not raised all over the back of his head  Rituxan was stopped and hypersensitvity kit given: Benadryl 25mg IVP, hydriocortisone 50mg IVP, and Ranitidine 50mg IV over 20 minutes  Patient had relief within minutes  Spoke with Annemarie Meigs, RN/Dr Regan Brown and instructed to hold Rituxan for 30 minutes post reaction and restart at rate of 100mL/hr  Restarted Rituxan at 1244

## 2017-07-06 NOTE — PLAN OF CARE
Problem: Potential for Falls  Goal: Patient will remain free of falls  INTERVENTIONS:  - Assess patient frequently for physical needs  -  Identify cognitive and physical deficits and behaviors that affect risk of falls    -  [place] fall precautions as indicated by assessment   - Educate patient/family on patient safety including physical limitations  - Instruct patient to call for assistance with activity based on assessment  - Modify environment to reduce risk of injury  - Consider OT/PT consult to assist with strengthening/mobility   Outcome: Progressing

## 2017-07-06 NOTE — PROGRESS NOTES
Pt c/o red, and itchy feeling with nausea  Skin red with small hivesnoted on scalp and neck    Rituxal held and pt treated with hyper sensitivity protocal,

## 2017-07-06 NOTE — PROGRESS NOTES
Patient offers no complaints  Requests to have treatment all today and not broken into two days  Spoke with Brandi Mills and per natalie Parr to give day 2 cytoxan, adriamycin and vincrisitne with today's day 1 Rituxan

## 2017-07-07 ENCOUNTER — HOSPITAL ENCOUNTER (OUTPATIENT)
Dept: INFUSION CENTER | Facility: HOSPITAL | Age: 47
Discharge: HOME | End: 2017-07-07
Payer: COMMERCIAL

## 2017-07-07 VITALS
HEART RATE: 74 BPM | RESPIRATION RATE: 18 BRPM | TEMPERATURE: 97.7 F | DIASTOLIC BLOOD PRESSURE: 68 MMHG | SYSTOLIC BLOOD PRESSURE: 122 MMHG

## 2017-07-07 PROCEDURE — 96367 TX/PROPH/DG ADDL SEQ IV INF: CPT

## 2017-07-07 PROCEDURE — 96411 CHEMO IV PUSH ADDL DRUG: CPT

## 2017-07-07 PROCEDURE — 96413 CHEMO IV INFUSION 1 HR: CPT

## 2017-07-07 PROCEDURE — 96417 CHEMO IV INFUS EACH ADDL SEQ: CPT

## 2017-07-07 RX ORDER — DOXORUBICIN HYDROCHLORIDE 2 MG/ML
107 INJECTION, SOLUTION INTRAVENOUS ONCE
Status: COMPLETED | OUTPATIENT
Start: 2017-07-07 | End: 2017-07-07

## 2017-07-07 RX ORDER — SODIUM CHLORIDE 9 MG/ML
20 INJECTION, SOLUTION INTRAVENOUS ONCE
Status: COMPLETED | OUTPATIENT
Start: 2017-07-07 | End: 2017-07-07

## 2017-07-07 RX ADMIN — DEXAMETHASONE SODIUM PHOSPHATE: 10 INJECTION, SOLUTION INTRAMUSCULAR; INTRAVENOUS at 08:38

## 2017-07-07 RX ADMIN — VINCRISTINE SULFATE 2 MG: 1 INJECTION, SOLUTION INTRAVENOUS at 10:48

## 2017-07-07 RX ADMIN — SODIUM CHLORIDE 150 MG: 0.9 INJECTION, SOLUTION INTRAVENOUS at 09:02

## 2017-07-07 RX ADMIN — DOXORUBICIN HYDROCHLORIDE 107 MG: 2 INJECTION, SOLUTION INTRAVENOUS at 10:26

## 2017-07-07 RX ADMIN — Medication 300 UNITS: at 11:14

## 2017-07-07 RX ADMIN — SODIUM CHLORIDE 20 ML/HR: 0.9 INJECTION, SOLUTION INTRAVENOUS at 08:38

## 2017-07-07 RX ADMIN — CYCLOPHOSPHAMIDE 1600 MG: 1 INJECTION, POWDER, FOR SOLUTION INTRAVENOUS; ORAL at 09:41

## 2017-07-07 NOTE — PLAN OF CARE
Problem: Potential for Falls  Goal: Patient will remain free of falls  INTERVENTIONS:  - Assess patient frequently for physical needs  -  Identify cognitive and physical deficits and behaviors that affect risk of falls    -  [place] fall precautions as indicated by assessment   - Educate patient/family on patient safety including physical limitations  - Instruct patient to call for assistance with activity based on assessment  - Modify environment to reduce risk of injury  - Consider OT/PT consult to assist with strengthening/mobility   Outcome: Progressing

## 2017-07-12 ENCOUNTER — TRANSCRIBE ORDERS (OUTPATIENT)
Dept: ADMINISTRATIVE | Facility: HOSPITAL | Age: 47
End: 2017-07-12

## 2017-07-12 ENCOUNTER — ALLSCRIPTS OFFICE VISIT (OUTPATIENT)
Dept: OTHER | Facility: OTHER | Age: 47
End: 2017-07-12

## 2017-07-12 DIAGNOSIS — C82.48 FOLLICULAR LYMPHOMA GRADE IIIB OF LYMPH NODES OF MULTIPLE SITES (HCC): Primary | ICD-10-CM

## 2017-07-24 ENCOUNTER — HOSPITAL ENCOUNTER (OUTPATIENT)
Dept: INFUSION CENTER | Facility: HOSPITAL | Age: 47
Discharge: HOME | End: 2017-07-24
Payer: COMMERCIAL

## 2017-07-24 LAB
ALBUMIN SERPL BCP-MCNC: 3.6 G/DL (ref 3.5–5)
ALP SERPL-CCNC: 85 U/L (ref 46–116)
ALT SERPL W P-5'-P-CCNC: 29 U/L (ref 12–78)
ANION GAP SERPL CALCULATED.3IONS-SCNC: 6 MMOL/L (ref 4–13)
AST SERPL W P-5'-P-CCNC: 24 U/L (ref 5–45)
BASOPHILS # BLD AUTO: 0.07 THOUSANDS/ΜL (ref 0–0.1)
BASOPHILS NFR BLD AUTO: 3 % (ref 0–1)
BILIRUB SERPL-MCNC: 0.56 MG/DL (ref 0.2–1)
BUN SERPL-MCNC: 11 MG/DL (ref 5–25)
CALCIUM SERPL-MCNC: 8.5 MG/DL (ref 8.3–10.1)
CHLORIDE SERPL-SCNC: 109 MMOL/L (ref 100–108)
CO2 SERPL-SCNC: 27 MMOL/L (ref 21–32)
CREAT SERPL-MCNC: 0.8 MG/DL (ref 0.6–1.3)
EOSINOPHIL # BLD AUTO: 0.08 THOUSAND/ΜL (ref 0–0.61)
EOSINOPHIL NFR BLD AUTO: 3 % (ref 0–6)
ERYTHROCYTE [DISTWIDTH] IN BLOOD BY AUTOMATED COUNT: 15.4 % (ref 11.6–15.1)
GFR SERPL CREATININE-BSD FRML MDRD: 106 ML/MIN/1.73SQ M
GLUCOSE SERPL-MCNC: 101 MG/DL (ref 65–140)
HCT VFR BLD AUTO: 38.1 % (ref 36.5–49.3)
HGB BLD-MCNC: 13 G/DL (ref 12–17)
LYMPHOCYTES # BLD AUTO: 1.02 THOUSANDS/ΜL (ref 0.6–4.47)
LYMPHOCYTES NFR BLD AUTO: 43 % (ref 14–44)
MCH RBC QN AUTO: 28.3 PG (ref 26.8–34.3)
MCHC RBC AUTO-ENTMCNC: 34.1 G/DL (ref 31.4–37.4)
MCV RBC AUTO: 83 FL (ref 82–98)
MONOCYTES # BLD AUTO: 0.49 THOUSAND/ΜL (ref 0.17–1.22)
MONOCYTES NFR BLD AUTO: 21 % (ref 4–12)
NEUTROPHILS # BLD AUTO: 0.72 THOUSANDS/ΜL (ref 1.85–7.62)
NEUTS SEG NFR BLD AUTO: 30 % (ref 43–75)
NRBC BLD AUTO-RTO: 0 /100 WBCS
PLATELET # BLD AUTO: 129 THOUSANDS/UL (ref 149–390)
PMV BLD AUTO: 10.3 FL (ref 8.9–12.7)
POTASSIUM SERPL-SCNC: 4 MMOL/L (ref 3.5–5.3)
PROT SERPL-MCNC: 6.4 G/DL (ref 6.4–8.2)
RBC # BLD AUTO: 4.6 MILLION/UL (ref 3.88–5.62)
SODIUM SERPL-SCNC: 142 MMOL/L (ref 136–145)
WBC # BLD AUTO: 2.39 THOUSAND/UL (ref 4.31–10.16)

## 2017-07-24 PROCEDURE — 80053 COMPREHEN METABOLIC PANEL: CPT | Performed by: INTERNAL MEDICINE

## 2017-07-24 PROCEDURE — 85025 COMPLETE CBC W/AUTO DIFF WBC: CPT | Performed by: INTERNAL MEDICINE

## 2017-07-24 RX ADMIN — Medication 300 UNITS: at 07:58

## 2017-07-24 NOTE — PROGRESS NOTES
Call to Anabel Subramanian RN to verify which labs should be drawn  Bloodwork orders faxed, however [employer] stated that only a CBC and CMP needed to be drawn at this time  Central labs drawn via port  Catheter maintenance performed per protocol without complications  Pt aware of next appointment   Refused AVS

## 2017-07-26 RX ORDER — SODIUM CHLORIDE 9 MG/ML
20 INJECTION, SOLUTION INTRAVENOUS CONTINUOUS
Status: DISCONTINUED | OUTPATIENT
Start: 2017-07-27 | End: 2017-07-30 | Stop reason: HOSPADM

## 2017-07-26 RX ORDER — ACETAMINOPHEN 325 MG/1
650 TABLET ORAL ONCE
Status: COMPLETED | OUTPATIENT
Start: 2017-07-27 | End: 2017-07-27

## 2017-07-26 RX ORDER — DOXORUBICIN HYDROCHLORIDE 2 MG/ML
107 INJECTION, SOLUTION INTRAVENOUS ONCE
Status: COMPLETED | OUTPATIENT
Start: 2017-07-27 | End: 2017-07-27

## 2017-07-27 ENCOUNTER — HOSPITAL ENCOUNTER (OUTPATIENT)
Dept: INFUSION CENTER | Facility: CLINIC | Age: 47
Discharge: HOME | End: 2017-07-27
Payer: COMMERCIAL

## 2017-07-27 VITALS
OXYGEN SATURATION: 97 % | WEIGHT: 195.77 LBS | HEART RATE: 69 BPM | DIASTOLIC BLOOD PRESSURE: 68 MMHG | TEMPERATURE: 96.6 F | RESPIRATION RATE: 20 BRPM | HEIGHT: 74 IN | BODY MASS INDEX: 25.12 KG/M2 | SYSTOLIC BLOOD PRESSURE: 124 MMHG

## 2017-07-27 DIAGNOSIS — C82.48 FOLLICULAR LYMPHOMA GRADE IIIB OF LYMPH NODES OF MULTIPLE SITES (HCC): ICD-10-CM

## 2017-07-27 LAB
BASOPHILS # BLD AUTO: 0.04 THOUSANDS/ΜL (ref 0–0.1)
BASOPHILS NFR BLD AUTO: 1 % (ref 0–1)
EOSINOPHIL # BLD AUTO: 0.03 THOUSAND/ΜL (ref 0–0.61)
EOSINOPHIL NFR BLD AUTO: 1 % (ref 0–6)
ERYTHROCYTE [DISTWIDTH] IN BLOOD BY AUTOMATED COUNT: 15.7 % (ref 11.6–15.1)
HCT VFR BLD AUTO: 39.4 % (ref 36.5–49.3)
HGB BLD-MCNC: 13.4 G/DL (ref 12–17)
LYMPHOCYTES # BLD AUTO: 1.7 THOUSANDS/ΜL (ref 0.6–4.47)
LYMPHOCYTES NFR BLD AUTO: 32 % (ref 14–44)
MCH RBC QN AUTO: 28.2 PG (ref 26.8–34.3)
MCHC RBC AUTO-ENTMCNC: 34 G/DL (ref 31.4–37.4)
MCV RBC AUTO: 83 FL (ref 82–98)
MONOCYTES # BLD AUTO: 0.77 THOUSAND/ΜL (ref 0.17–1.22)
MONOCYTES NFR BLD AUTO: 15 % (ref 4–12)
NEUTROPHILS # BLD AUTO: 2.72 THOUSANDS/ΜL (ref 1.85–7.62)
NEUTS SEG NFR BLD AUTO: 51 % (ref 43–75)
PLATELET # BLD AUTO: 242 THOUSANDS/UL (ref 149–390)
PMV BLD AUTO: 10.4 FL (ref 8.9–12.7)
RBC # BLD AUTO: 4.76 MILLION/UL (ref 3.88–5.62)
WBC # BLD AUTO: 5.26 THOUSAND/UL (ref 4.31–10.16)

## 2017-07-27 PROCEDURE — 96417 CHEMO IV INFUS EACH ADDL SEQ: CPT

## 2017-07-27 PROCEDURE — 96376 TX/PRO/DX INJ SAME DRUG ADON: CPT

## 2017-07-27 PROCEDURE — 96367 TX/PROPH/DG ADDL SEQ IV INF: CPT

## 2017-07-27 PROCEDURE — 85025 COMPLETE CBC W/AUTO DIFF WBC: CPT | Performed by: INTERNAL MEDICINE

## 2017-07-27 PROCEDURE — 96415 CHEMO IV INFUSION ADDL HR: CPT

## 2017-07-27 PROCEDURE — 96413 CHEMO IV INFUSION 1 HR: CPT

## 2017-07-27 PROCEDURE — 96375 TX/PRO/DX INJ NEW DRUG ADDON: CPT

## 2017-07-27 PROCEDURE — 96411 CHEMO IV PUSH ADDL DRUG: CPT

## 2017-07-27 RX ORDER — PREDNISONE 1 MG/1
TABLET ORAL DAILY
COMMUNITY
End: 2018-01-31 | Stop reason: CLARIF

## 2017-07-27 RX ORDER — DIPHENHYDRAMINE HYDROCHLORIDE 50 MG/ML
25 INJECTION INTRAMUSCULAR; INTRAVENOUS ONCE
Status: COMPLETED | OUTPATIENT
Start: 2017-07-27 | End: 2017-07-27

## 2017-07-27 RX ORDER — DIPHENHYDRAMINE HYDROCHLORIDE 50 MG/ML
25 INJECTION INTRAMUSCULAR; INTRAVENOUS
Status: ACTIVE | OUTPATIENT
Start: 2017-07-27 | End: 2017-07-27

## 2017-07-27 RX ADMIN — VINCRISTINE SULFATE 2 MG: 1 INJECTION, SOLUTION INTRAVENOUS at 16:52

## 2017-07-27 RX ADMIN — DIPHENHYDRAMINE HYDROCHLORIDE 25 MG: 50 INJECTION, SOLUTION INTRAMUSCULAR; INTRAVENOUS at 08:46

## 2017-07-27 RX ADMIN — CYCLOPHOSPHAMIDE 1605 MG: 2 INJECTION, POWDER, FOR SOLUTION INTRAVENOUS; ORAL at 16:00

## 2017-07-27 RX ADMIN — HYDROCORTISONE SODIUM SUCCINATE 100 MG: 100 INJECTION, POWDER, FOR SOLUTION INTRAMUSCULAR; INTRAVENOUS at 08:42

## 2017-07-27 RX ADMIN — DEXAMETHASONE SODIUM PHOSPHATE: 10 INJECTION, SOLUTION INTRAMUSCULAR; INTRAVENOUS at 15:15

## 2017-07-27 RX ADMIN — ACETAMINOPHEN 650 MG: 325 TABLET ORAL at 08:41

## 2017-07-27 RX ADMIN — SODIUM CHLORIDE 150 MG: 0.9 INJECTION, SOLUTION INTRAVENOUS at 15:33

## 2017-07-27 RX ADMIN — RITUXIMAB 800 MG: 10 INJECTION, SOLUTION INTRAVENOUS at 09:33

## 2017-07-27 RX ADMIN — DIPHENHYDRAMINE HYDROCHLORIDE 25 MG: 50 INJECTION, SOLUTION INTRAMUSCULAR; INTRAVENOUS at 12:08

## 2017-07-27 RX ADMIN — SODIUM CHLORIDE 20 ML/HR: 0.9 INJECTION, SOLUTION INTRAVENOUS at 08:42

## 2017-07-27 RX ADMIN — HEPARIN 300 UNITS: 100 SYRINGE at 17:14

## 2017-07-27 RX ADMIN — DOXORUBICIN HYDROCHLORIDE 107 MG: 2 INJECTION, SOLUTION INTRAVENOUS at 16:35

## 2017-07-27 NOTE — PLAN OF CARE
Problem: Potential for Falls  Goal: Patient will remain free of falls  INTERVENTIONS:  - Assess patient frequently for physical needs  -  Identify cognitive and physical deficits and behaviors that affect risk of falls    -  [place] fall precautions as indicated by assessment   - Educate patient/family on patient safety including physical limitations  - Instruct patient to call for assistance with activity based on assessment  - Modify environment to reduce risk of injury  - Consider OT/PT consult to assist with strengthening/mobility   Outcome: Progressing

## 2017-07-27 NOTE — PROGRESS NOTES
Nurse spoke w/Lisa Hannon RN & informed her pt  C/o hives & itching on the back of his head and states he has some mild nausea at this time  Rituxan stopped & benadryl 25mg IVP given @ 1203 & pt  States itching has subsided but he still fells slightly nauseous  VSS  Nurse informed Sung Greenberg that pts  Rituxan was running at cap of 100ml/hr at time of complaint  Sung Greenberg discussed the same w/Dr Darion Hall & he instructed nurse to restart Rituxan at current rate of 100ml/hr at thsi time  Nurse informed the pt  Of the same, pt  Agreeable but states he would like to wait a few minutes before restarting

## 2017-07-27 NOTE — PROGRESS NOTES
Pt  Tolerated further treatment w/out adverse reaction  Confirmed pts  Next appt   Pt  Declined AVS

## 2017-07-27 NOTE — PROGRESS NOTES
Orders in chart to redraw CBC  Results within limits  Spoke with Mandy Bacon RN to verify, and pt ok for treatment today

## 2017-08-04 ENCOUNTER — HOSPITAL ENCOUNTER (OUTPATIENT)
Dept: RADIOLOGY | Age: 47
Discharge: HOME | End: 2017-08-04
Payer: COMMERCIAL

## 2017-08-04 DIAGNOSIS — C82.48 FOLLICULAR LYMPHOMA GRADE IIIB OF LYMPH NODES OF MULTIPLE SITES (HCC): ICD-10-CM

## 2017-08-04 LAB — GLUCOSE SERPL-MCNC: 107 MG/DL (ref 65–140)

## 2017-08-04 PROCEDURE — 82948 REAGENT STRIP/BLOOD GLUCOSE: CPT

## 2017-08-04 PROCEDURE — 78815 PET IMAGE W/CT SKULL-THIGH: CPT

## 2017-08-04 PROCEDURE — A9552 F18 FDG: HCPCS

## 2017-08-04 RX ADMIN — IOHEXOL 5 ML: 240 INJECTION, SOLUTION INTRATHECAL; INTRAVASCULAR; INTRAVENOUS; ORAL at 07:17

## 2017-08-09 ENCOUNTER — GENERIC CONVERSION - ENCOUNTER (OUTPATIENT)
Dept: OTHER | Facility: OTHER | Age: 47
End: 2017-08-09

## 2017-08-16 ENCOUNTER — ALLSCRIPTS OFFICE VISIT (OUTPATIENT)
Dept: OTHER | Facility: OTHER | Age: 47
End: 2017-08-16

## 2017-08-16 ENCOUNTER — TRANSCRIBE ORDERS (OUTPATIENT)
Dept: ADMINISTRATIVE | Facility: HOSPITAL | Age: 47
End: 2017-08-16

## 2017-08-16 DIAGNOSIS — C82.48 FOLLICULAR LYMPHOMA GRADE IIIB OF LYMPH NODES OF MULTIPLE SITES (HCC): Primary | ICD-10-CM

## 2017-08-17 ENCOUNTER — LAB CONVERSION - ENCOUNTER (OUTPATIENT)
Dept: OTHER | Facility: OTHER | Age: 47
End: 2017-08-17

## 2017-08-17 LAB
A/G RATIO (HISTORICAL): 2.2 (CALC) (ref 1–2.5)
ALBUMIN SERPL BCP-MCNC: 4.3 G/DL (ref 3.6–5.1)
ALP SERPL-CCNC: 75 U/L (ref 40–115)
ALT SERPL W P-5'-P-CCNC: 29 U/L (ref 9–46)
AST SERPL W P-5'-P-CCNC: 24 U/L (ref 10–40)
BASOPHILS # BLD AUTO: 1.7 %
BASOPHILS # BLD AUTO: 60 CELLS/UL (ref 0–200)
BILIRUB SERPL-MCNC: 0.5 MG/DL (ref 0.2–1.2)
BUN SERPL-MCNC: 13 MG/DL (ref 7–25)
BUN/CREA RATIO (HISTORICAL): ABNORMAL (CALC) (ref 6–22)
CALCIUM SERPL-MCNC: 9.4 MG/DL (ref 8.6–10.3)
CHLORIDE SERPL-SCNC: 106 MMOL/L (ref 98–110)
CO2 SERPL-SCNC: 29 MMOL/L (ref 20–31)
CREAT SERPL-MCNC: 0.84 MG/DL (ref 0.6–1.35)
DEPRECATED RDW RBC AUTO: 16.3 % (ref 11–15)
EGFR AFRICAN AMERICAN (HISTORICAL): 120 ML/MIN/1.73M2
EGFR-AMERICAN CALC (HISTORICAL): 104 ML/MIN/1.73M2
EOSINOPHIL # BLD AUTO: 2.6 %
EOSINOPHIL # BLD AUTO: 91 CELLS/UL (ref 15–500)
GAMMA GLOBULIN (HISTORICAL): 2 G/DL (CALC) (ref 1.9–3.7)
GLUCOSE (HISTORICAL): 111 MG/DL (ref 65–99)
HCT VFR BLD AUTO: 41.5 % (ref 38.5–50)
HGB BLD-MCNC: 13.9 G/DL (ref 13.2–17.1)
LYMPHOCYTES # BLD AUTO: 1292 CELLS/UL (ref 850–3900)
LYMPHOCYTES # BLD AUTO: 36.9 %
MCH RBC QN AUTO: 28 PG (ref 27–33)
MCHC RBC AUTO-ENTMCNC: 33.5 G/DL (ref 32–36)
MCV RBC AUTO: 83.7 FL (ref 80–100)
MONOCYTES # BLD AUTO: 662 CELLS/UL (ref 200–950)
MONOCYTES (HISTORICAL): 18.9 %
NEUTROPHILS # BLD AUTO: 1397 CELLS/UL (ref 1500–7800)
NEUTROPHILS # BLD AUTO: 39.9 %
PLATELET # BLD AUTO: 210 THOUSAND/UL (ref 140–400)
PMV BLD AUTO: 10.9 FL (ref 7.5–12.5)
POTASSIUM SERPL-SCNC: 4.4 MMOL/L (ref 3.5–5.3)
RBC # BLD AUTO: 4.96 MILLION/UL (ref 4.2–5.8)
SODIUM SERPL-SCNC: 140 MMOL/L (ref 135–146)
TOTAL PROTEIN (HISTORICAL): 6.3 G/DL (ref 6.1–8.1)
WBC # BLD AUTO: 3.5 THOUSAND/UL (ref 3.8–10.8)

## 2017-08-18 RX ORDER — SODIUM CHLORIDE 9 MG/ML
20 INJECTION, SOLUTION INTRAVENOUS CONTINUOUS
Status: DISCONTINUED | OUTPATIENT
Start: 2017-08-21 | End: 2017-08-24 | Stop reason: HOSPADM

## 2017-08-18 RX ORDER — ACETAMINOPHEN 325 MG/1
650 TABLET ORAL ONCE
Status: COMPLETED | OUTPATIENT
Start: 2017-08-21 | End: 2017-08-21

## 2017-08-18 RX ORDER — DOXORUBICIN HYDROCHLORIDE 2 MG/ML
107 INJECTION, SOLUTION INTRAVENOUS ONCE
Status: COMPLETED | OUTPATIENT
Start: 2017-08-21 | End: 2017-08-21

## 2017-08-21 ENCOUNTER — HOSPITAL ENCOUNTER (OUTPATIENT)
Dept: INFUSION CENTER | Facility: CLINIC | Age: 47
Discharge: HOME | End: 2017-08-21
Payer: COMMERCIAL

## 2017-08-21 VITALS
BODY MASS INDEX: 26.64 KG/M2 | RESPIRATION RATE: 18 BRPM | SYSTOLIC BLOOD PRESSURE: 143 MMHG | DIASTOLIC BLOOD PRESSURE: 85 MMHG | WEIGHT: 201 LBS | HEART RATE: 66 BPM | HEIGHT: 73 IN | TEMPERATURE: 96.9 F

## 2017-08-21 PROCEDURE — 96375 TX/PRO/DX INJ NEW DRUG ADDON: CPT

## 2017-08-21 PROCEDURE — 96417 CHEMO IV INFUS EACH ADDL SEQ: CPT

## 2017-08-21 PROCEDURE — 96367 TX/PROPH/DG ADDL SEQ IV INF: CPT

## 2017-08-21 PROCEDURE — 96411 CHEMO IV PUSH ADDL DRUG: CPT

## 2017-08-21 PROCEDURE — 96413 CHEMO IV INFUSION 1 HR: CPT

## 2017-08-21 PROCEDURE — 96415 CHEMO IV INFUSION ADDL HR: CPT

## 2017-08-21 RX ADMIN — Medication 300 UNITS: at 16:31

## 2017-08-21 RX ADMIN — RITUXIMAB 802 MG: 10 INJECTION, SOLUTION INTRAVENOUS at 09:26

## 2017-08-21 RX ADMIN — SODIUM CHLORIDE 150 MG: 0.9 INJECTION, SOLUTION INTRAVENOUS at 14:47

## 2017-08-21 RX ADMIN — DIPHENHYDRAMINE HYDROCHLORIDE 25 MG: 50 INJECTION, SOLUTION INTRAMUSCULAR; INTRAVENOUS at 08:51

## 2017-08-21 RX ADMIN — CYCLOPHOSPHAMIDE 1605 MG: 2 INJECTION, POWDER, FOR SOLUTION INTRAVENOUS; ORAL at 15:53

## 2017-08-21 RX ADMIN — ACETAMINOPHEN 650 MG: 325 TABLET ORAL at 08:40

## 2017-08-21 RX ADMIN — DOXORUBICIN HYDROCHLORIDE 107 MG: 2 INJECTION, SOLUTION INTRAVENOUS at 15:19

## 2017-08-21 RX ADMIN — VINCRISTINE SULFATE 2 MG: 1 INJECTION, SOLUTION INTRAVENOUS at 15:33

## 2017-08-21 RX ADMIN — HYDROCORTISONE SODIUM SUCCINATE 100 MG: 100 INJECTION, POWDER, FOR SOLUTION INTRAMUSCULAR; INTRAVENOUS at 08:40

## 2017-08-21 RX ADMIN — DEXAMETHASONE SODIUM PHOSPHATE: 10 INJECTION, SOLUTION INTRAMUSCULAR; INTRAVENOUS at 14:25

## 2017-08-21 RX ADMIN — SODIUM CHLORIDE 20 ML/HR: 0.9 INJECTION, SOLUTION INTRAVENOUS at 08:23

## 2017-08-21 NOTE — PROGRESS NOTES
Pt to clinic for rchop infusion, Anc [phone], order ok to treat, pt aware of next appointment, declines avs

## 2017-09-06 ENCOUNTER — GENERIC CONVERSION - ENCOUNTER (OUTPATIENT)
Dept: OTHER | Facility: OTHER | Age: 47
End: 2017-09-06

## 2017-09-06 DIAGNOSIS — C82.48 FOLLICULAR LYMPHOMA GRADE IIIB OF LYMPH NODES OF MULTIPLE SITES (HCC): ICD-10-CM

## 2017-09-07 ENCOUNTER — HOSPITAL ENCOUNTER (OUTPATIENT)
Dept: INFUSION CENTER | Facility: CLINIC | Age: 47
Discharge: HOME | End: 2017-09-07
Payer: COMMERCIAL

## 2017-09-07 LAB
ALBUMIN SERPL BCP-MCNC: 3.6 G/DL (ref 3.5–5)
ALP SERPL-CCNC: 78 U/L (ref 46–116)
ALT SERPL W P-5'-P-CCNC: 54 U/L (ref 12–78)
ANION GAP SERPL CALCULATED.3IONS-SCNC: 5 MMOL/L (ref 4–13)
AST SERPL W P-5'-P-CCNC: 29 U/L (ref 5–45)
BASOPHILS # BLD AUTO: 0.05 THOUSANDS/ΜL (ref 0–0.1)
BASOPHILS NFR BLD AUTO: 2 % (ref 0–1)
BILIRUB SERPL-MCNC: 0.6 MG/DL (ref 0.2–1)
BUN SERPL-MCNC: 11 MG/DL (ref 5–25)
CALCIUM SERPL-MCNC: 8.9 MG/DL (ref 8.3–10.1)
CHLORIDE SERPL-SCNC: 107 MMOL/L (ref 100–108)
CO2 SERPL-SCNC: 30 MMOL/L (ref 21–32)
CREAT SERPL-MCNC: 0.83 MG/DL (ref 0.6–1.3)
EOSINOPHIL # BLD AUTO: 0.11 THOUSAND/ΜL (ref 0–0.61)
EOSINOPHIL NFR BLD AUTO: 5 % (ref 0–6)
ERYTHROCYTE [DISTWIDTH] IN BLOOD BY AUTOMATED COUNT: 15 % (ref 11.6–15.1)
GFR SERPL CREATININE-BSD FRML MDRD: 104 ML/MIN/1.73SQ M
GLUCOSE SERPL-MCNC: 113 MG/DL (ref 65–140)
HCT VFR BLD AUTO: 40.9 % (ref 36.5–49.3)
HGB BLD-MCNC: 14.1 G/DL (ref 12–17)
LYMPHOCYTES # BLD AUTO: 1.06 THOUSANDS/ΜL (ref 0.6–4.47)
LYMPHOCYTES NFR BLD AUTO: 51 % (ref 14–44)
MCH RBC QN AUTO: 28.1 PG (ref 26.8–34.3)
MCHC RBC AUTO-ENTMCNC: 34.5 G/DL (ref 31.4–37.4)
MCV RBC AUTO: 82 FL (ref 82–98)
MONOCYTES # BLD AUTO: 0.4 THOUSAND/ΜL (ref 0.17–1.22)
MONOCYTES NFR BLD AUTO: 19 % (ref 4–12)
NEUTROPHILS # BLD AUTO: 0.47 THOUSANDS/ΜL (ref 1.85–7.62)
NEUTS SEG NFR BLD AUTO: 23 % (ref 43–75)
PLATELET # BLD AUTO: 152 THOUSANDS/UL (ref 149–390)
PMV BLD AUTO: 10.7 FL (ref 8.9–12.7)
POTASSIUM SERPL-SCNC: 4.2 MMOL/L (ref 3.5–5.3)
PROT SERPL-MCNC: 6.8 G/DL (ref 6.4–8.2)
RBC # BLD AUTO: 5.02 MILLION/UL (ref 3.88–5.62)
SODIUM SERPL-SCNC: 142 MMOL/L (ref 136–145)
WBC # BLD AUTO: 2.09 THOUSAND/UL (ref 4.31–10.16)

## 2017-09-07 PROCEDURE — 85025 COMPLETE CBC W/AUTO DIFF WBC: CPT | Performed by: INTERNAL MEDICINE

## 2017-09-07 PROCEDURE — 80053 COMPREHEN METABOLIC PANEL: CPT | Performed by: INTERNAL MEDICINE

## 2017-09-07 RX ADMIN — Medication 300 UNITS: at 08:34

## 2017-09-08 RX ORDER — SODIUM CHLORIDE 9 MG/ML
20 INJECTION, SOLUTION INTRAVENOUS CONTINUOUS
Status: DISCONTINUED | OUTPATIENT
Start: 2017-09-11 | End: 2017-09-14 | Stop reason: HOSPADM

## 2017-09-08 RX ORDER — DOXORUBICIN HYDROCHLORIDE 2 MG/ML
107 INJECTION, SOLUTION INTRAVENOUS ONCE
Status: COMPLETED | OUTPATIENT
Start: 2017-09-11 | End: 2017-09-11

## 2017-09-08 RX ORDER — ACETAMINOPHEN 325 MG/1
650 TABLET ORAL ONCE
Status: COMPLETED | OUTPATIENT
Start: 2017-09-11 | End: 2017-09-11

## 2017-09-11 ENCOUNTER — HOSPITAL ENCOUNTER (OUTPATIENT)
Dept: INFUSION CENTER | Facility: CLINIC | Age: 47
Discharge: HOME | End: 2017-09-11
Payer: COMMERCIAL

## 2017-09-11 VITALS
RESPIRATION RATE: 18 BRPM | TEMPERATURE: 97.6 F | SYSTOLIC BLOOD PRESSURE: 129 MMHG | BODY MASS INDEX: 24.84 KG/M2 | DIASTOLIC BLOOD PRESSURE: 83 MMHG | HEIGHT: 76 IN | HEART RATE: 73 BPM | WEIGHT: 204 LBS

## 2017-09-11 LAB
ANISOCYTOSIS BLD QL SMEAR: PRESENT
BASOPHILS # BLD MANUAL: 0 THOUSAND/UL (ref 0–0.1)
BASOPHILS NFR MAR MANUAL: 0 % (ref 0–1)
DACRYOCYTES BLD QL SMEAR: PRESENT
EOSINOPHIL # BLD MANUAL: 0 THOUSAND/UL (ref 0–0.4)
EOSINOPHIL NFR BLD MANUAL: 0 % (ref 0–6)
ERYTHROCYTE [DISTWIDTH] IN BLOOD BY AUTOMATED COUNT: 15.1 % (ref 11.6–15.1)
HCT VFR BLD AUTO: 40 % (ref 36.5–49.3)
HGB BLD-MCNC: 13.6 G/DL (ref 12–17)
LYMPHOCYTES # BLD AUTO: 2.16 THOUSAND/UL (ref 0.6–4.47)
LYMPHOCYTES # BLD AUTO: 34 % (ref 14–44)
MCH RBC QN AUTO: 27.8 PG (ref 26.8–34.3)
MCHC RBC AUTO-ENTMCNC: 34 G/DL (ref 31.4–37.4)
MCV RBC AUTO: 82 FL (ref 82–98)
MONOCYTES # BLD AUTO: 0.64 THOUSAND/UL (ref 0–1.22)
MONOCYTES NFR BLD: 10 % (ref 4–12)
NEUTROPHILS # BLD MANUAL: 3.05 THOUSAND/UL (ref 1.85–7.62)
NEUTS HYPERSEG BLD QL SMEAR: PRESENT
NEUTS SEG NFR BLD AUTO: 48 % (ref 43–75)
NRBC BLD AUTO-RTO: 1 /100 WBC (ref 0–2)
PLATELET # BLD AUTO: 200 THOUSANDS/UL (ref 149–390)
PLATELET BLD QL SMEAR: ADEQUATE
PMV BLD AUTO: 11.1 FL (ref 8.9–12.7)
POIKILOCYTOSIS BLD QL SMEAR: PRESENT
POLYCHROMASIA BLD QL SMEAR: PRESENT
RBC # BLD AUTO: 4.9 MILLION/UL (ref 3.88–5.62)
TOTAL CELLS COUNTED SPEC: 100
VARIANT LYMPHS # BLD AUTO: 8 %
WBC # BLD AUTO: 6.36 THOUSAND/UL (ref 4.31–10.16)

## 2017-09-11 PROCEDURE — 85027 COMPLETE CBC AUTOMATED: CPT | Performed by: INTERNAL MEDICINE

## 2017-09-11 PROCEDURE — 96417 CHEMO IV INFUS EACH ADDL SEQ: CPT

## 2017-09-11 PROCEDURE — 85007 BL SMEAR W/DIFF WBC COUNT: CPT | Performed by: INTERNAL MEDICINE

## 2017-09-11 PROCEDURE — 96411 CHEMO IV PUSH ADDL DRUG: CPT

## 2017-09-11 PROCEDURE — 96415 CHEMO IV INFUSION ADDL HR: CPT

## 2017-09-11 PROCEDURE — 96375 TX/PRO/DX INJ NEW DRUG ADDON: CPT

## 2017-09-11 PROCEDURE — 96367 TX/PROPH/DG ADDL SEQ IV INF: CPT

## 2017-09-11 PROCEDURE — 96413 CHEMO IV INFUSION 1 HR: CPT

## 2017-09-11 RX ADMIN — DOXORUBICIN HYDROCHLORIDE 107 MG: 2 INJECTION, SOLUTION INTRAVENOUS at 14:32

## 2017-09-11 RX ADMIN — SODIUM CHLORIDE 150 MG: 0.9 INJECTION, SOLUTION INTRAVENOUS at 13:37

## 2017-09-11 RX ADMIN — DEXAMETHASONE SODIUM PHOSPHATE: 10 INJECTION, SOLUTION INTRAMUSCULAR; INTRAVENOUS at 13:18

## 2017-09-11 RX ADMIN — ACETAMINOPHEN 650 MG: 325 TABLET ORAL at 09:37

## 2017-09-11 RX ADMIN — Medication 300 UNITS: at 15:50

## 2017-09-11 RX ADMIN — DIPHENHYDRAMINE HYDROCHLORIDE 25 MG: 50 INJECTION, SOLUTION INTRAMUSCULAR; INTRAVENOUS at 09:55

## 2017-09-11 RX ADMIN — HYDROCORTISONE SODIUM SUCCINATE 100 MG: 100 INJECTION, POWDER, FOR SOLUTION INTRAMUSCULAR; INTRAVENOUS at 09:37

## 2017-09-11 RX ADMIN — RITUXIMAB 802 MG: 10 INJECTION, SOLUTION INTRAVENOUS at 10:24

## 2017-09-11 RX ADMIN — VINCRISTINE SULFATE 2 MG: 1 INJECTION, SOLUTION INTRAVENOUS at 14:46

## 2017-09-11 RX ADMIN — SODIUM CHLORIDE 20 ML/HR: 0.9 INJECTION, SOLUTION INTRAVENOUS at 09:14

## 2017-09-11 RX ADMIN — CYCLOPHOSPHAMIDE 1605 MG: 2 INJECTION, POWDER, FOR SOLUTION INTRAVENOUS; ORAL at 15:10

## 2017-09-11 NOTE — PROGRESS NOTES
Pt to clinic for cycle 5 HOP  ANC on labs last week noted to be 470  STAT CBC drawn this am--await results to plan tx for today  Of note, pt denies any fevers, shaking chills or other s/s of infection  Reviewed neutropenic precautions with he and his wife

## 2017-09-11 NOTE — PROGRESS NOTES
CBC with markedly improved WBC and ANC  Discussed with RENY Hannon RN to make her aware  Therapy given as per orders w/o incident  Pt to return in 3 weeks for final planned dose of R-CHOP    He will then begin maintenance Rituxan

## 2017-09-27 ENCOUNTER — GENERIC CONVERSION - ENCOUNTER (OUTPATIENT)
Dept: OTHER | Facility: OTHER | Age: 47
End: 2017-09-27

## 2017-09-29 ENCOUNTER — HOSPITAL ENCOUNTER (OUTPATIENT)
Dept: INFUSION CENTER | Facility: CLINIC | Age: 47
Discharge: HOME | End: 2017-09-29
Payer: COMMERCIAL

## 2017-09-29 LAB
ALBUMIN SERPL BCP-MCNC: 3.7 G/DL (ref 3.5–5)
ALP SERPL-CCNC: 100 U/L (ref 46–116)
ALT SERPL W P-5'-P-CCNC: 62 U/L (ref 12–78)
ANION GAP SERPL CALCULATED.3IONS-SCNC: 6 MMOL/L (ref 4–13)
ANISOCYTOSIS BLD QL SMEAR: PRESENT
AST SERPL W P-5'-P-CCNC: 32 U/L (ref 5–45)
BASOPHILS # BLD MANUAL: 0.15 THOUSAND/UL (ref 0–0.1)
BASOPHILS NFR MAR MANUAL: 6 % (ref 0–1)
BILIRUB SERPL-MCNC: 0.6 MG/DL (ref 0.2–1)
BUN SERPL-MCNC: 15 MG/DL (ref 5–25)
CALCIUM SERPL-MCNC: 8.8 MG/DL (ref 8.3–10.1)
CHLORIDE SERPL-SCNC: 105 MMOL/L (ref 100–108)
CO2 SERPL-SCNC: 29 MMOL/L (ref 21–32)
CREAT SERPL-MCNC: 0.88 MG/DL (ref 0.6–1.3)
EOSINOPHIL # BLD MANUAL: 0.03 THOUSAND/UL (ref 0–0.4)
EOSINOPHIL NFR BLD MANUAL: 1 % (ref 0–6)
ERYTHROCYTE [DISTWIDTH] IN BLOOD BY AUTOMATED COUNT: 14.8 % (ref 11.6–15.1)
GFR SERPL CREATININE-BSD FRML MDRD: 102 ML/MIN/1.73SQ M
GLUCOSE P FAST SERPL-MCNC: 106 MG/DL (ref 65–99)
GLUCOSE SERPL-MCNC: 106 MG/DL (ref 65–140)
HCT VFR BLD AUTO: 39.3 % (ref 36.5–49.3)
HGB BLD-MCNC: 13.6 G/DL (ref 12–17)
LYMPHOCYTES # BLD AUTO: 0.9 THOUSAND/UL (ref 0.6–4.47)
LYMPHOCYTES # BLD AUTO: 35 % (ref 14–44)
MCH RBC QN AUTO: 28.4 PG (ref 26.8–34.3)
MCHC RBC AUTO-ENTMCNC: 34.6 G/DL (ref 31.4–37.4)
MCV RBC AUTO: 82 FL (ref 82–98)
MONOCYTES # BLD AUTO: 0.54 THOUSAND/UL (ref 0–1.22)
MONOCYTES NFR BLD: 21 % (ref 4–12)
NEUTROPHILS # BLD MANUAL: 0.8 THOUSAND/UL (ref 1.85–7.62)
NEUTS SEG NFR BLD AUTO: 31 % (ref 43–75)
PLATELET # BLD AUTO: 166 THOUSANDS/UL (ref 149–390)
PLATELET BLD QL SMEAR: ADEQUATE
PMV BLD AUTO: 10.7 FL (ref 8.9–12.7)
POTASSIUM SERPL-SCNC: 4 MMOL/L (ref 3.5–5.3)
PROT SERPL-MCNC: 6.7 G/DL (ref 6.4–8.2)
RBC # BLD AUTO: 4.79 MILLION/UL (ref 3.88–5.62)
SODIUM SERPL-SCNC: 140 MMOL/L (ref 136–145)
TOTAL CELLS COUNTED SPEC: 100
VARIANT LYMPHS # BLD AUTO: 6 %
WBC # BLD AUTO: 2.58 THOUSAND/UL (ref 4.31–10.16)

## 2017-09-29 PROCEDURE — 85007 BL SMEAR W/DIFF WBC COUNT: CPT | Performed by: INTERNAL MEDICINE

## 2017-09-29 PROCEDURE — 80053 COMPREHEN METABOLIC PANEL: CPT | Performed by: INTERNAL MEDICINE

## 2017-09-29 PROCEDURE — 85027 COMPLETE CBC AUTOMATED: CPT | Performed by: INTERNAL MEDICINE

## 2017-09-29 RX ORDER — DOXORUBICIN HYDROCHLORIDE 2 MG/ML
107 INJECTION, SOLUTION INTRAVENOUS ONCE
Status: COMPLETED | OUTPATIENT
Start: 2017-10-02 | End: 2017-10-02

## 2017-09-29 RX ORDER — SODIUM CHLORIDE 9 MG/ML
20 INJECTION, SOLUTION INTRAVENOUS CONTINUOUS
Status: DISCONTINUED | OUTPATIENT
Start: 2017-10-02 | End: 2017-10-05 | Stop reason: HOSPADM

## 2017-09-29 RX ORDER — ACETAMINOPHEN 325 MG/1
650 TABLET ORAL ONCE
Status: COMPLETED | OUTPATIENT
Start: 2017-10-02 | End: 2017-10-02

## 2017-09-29 RX ADMIN — Medication 300 UNITS: at 08:43

## 2017-10-02 ENCOUNTER — HOSPITAL ENCOUNTER (OUTPATIENT)
Dept: INFUSION CENTER | Facility: CLINIC | Age: 47
Discharge: HOME | End: 2017-10-02
Payer: COMMERCIAL

## 2017-10-02 VITALS
WEIGHT: 204.15 LBS | OXYGEN SATURATION: 98 % | HEART RATE: 70 BPM | HEIGHT: 73 IN | RESPIRATION RATE: 19 BRPM | BODY MASS INDEX: 27.06 KG/M2 | SYSTOLIC BLOOD PRESSURE: 137 MMHG | TEMPERATURE: 97.7 F | DIASTOLIC BLOOD PRESSURE: 88 MMHG

## 2017-10-02 LAB
BASOPHILS # BLD MANUAL: 0 THOUSAND/UL (ref 0–0.1)
BASOPHILS NFR MAR MANUAL: 0 % (ref 0–1)
EOSINOPHIL # BLD MANUAL: 0 THOUSAND/UL (ref 0–0.4)
EOSINOPHIL NFR BLD MANUAL: 0 % (ref 0–6)
ERYTHROCYTE [DISTWIDTH] IN BLOOD BY AUTOMATED COUNT: 14.9 % (ref 11.6–15.1)
HCT VFR BLD AUTO: 38.8 % (ref 36.5–49.3)
HGB BLD-MCNC: 13.2 G/DL (ref 12–17)
LYMPHOCYTES # BLD AUTO: 1.99 THOUSAND/UL (ref 0.6–4.47)
LYMPHOCYTES # BLD AUTO: 32 % (ref 14–44)
MCH RBC QN AUTO: 27.9 PG (ref 26.8–34.3)
MCHC RBC AUTO-ENTMCNC: 34 G/DL (ref 31.4–37.4)
MCV RBC AUTO: 82 FL (ref 82–98)
MONOCYTES # BLD AUTO: 0.68 THOUSAND/UL (ref 0–1.22)
MONOCYTES NFR BLD: 11 % (ref 4–12)
NEUTROPHILS # BLD MANUAL: 3.3 THOUSAND/UL (ref 1.85–7.62)
NEUTS SEG NFR BLD AUTO: 53 % (ref 43–75)
PLATELET # BLD AUTO: 204 THOUSANDS/UL (ref 149–390)
PLATELET BLD QL SMEAR: ADEQUATE
PMV BLD AUTO: 10.6 FL (ref 8.9–12.7)
RBC # BLD AUTO: 4.73 MILLION/UL (ref 3.88–5.62)
TOTAL CELLS COUNTED SPEC: 100
VARIANT LYMPHS # BLD AUTO: 4 %
WBC # BLD AUTO: 6.22 THOUSAND/UL (ref 4.31–10.16)

## 2017-10-02 PROCEDURE — 96411 CHEMO IV PUSH ADDL DRUG: CPT

## 2017-10-02 PROCEDURE — 96367 TX/PROPH/DG ADDL SEQ IV INF: CPT

## 2017-10-02 PROCEDURE — 85007 BL SMEAR W/DIFF WBC COUNT: CPT | Performed by: INTERNAL MEDICINE

## 2017-10-02 PROCEDURE — 96417 CHEMO IV INFUS EACH ADDL SEQ: CPT

## 2017-10-02 PROCEDURE — 96413 CHEMO IV INFUSION 1 HR: CPT

## 2017-10-02 PROCEDURE — 85027 COMPLETE CBC AUTOMATED: CPT | Performed by: INTERNAL MEDICINE

## 2017-10-02 PROCEDURE — 96375 TX/PRO/DX INJ NEW DRUG ADDON: CPT

## 2017-10-02 PROCEDURE — 96415 CHEMO IV INFUSION ADDL HR: CPT

## 2017-10-02 RX ADMIN — SODIUM CHLORIDE 150 MG: 0.9 INJECTION, SOLUTION INTRAVENOUS at 10:07

## 2017-10-02 RX ADMIN — HYDROCORTISONE SODIUM SUCCINATE 100 MG: 100 INJECTION, POWDER, FOR SOLUTION INTRAMUSCULAR; INTRAVENOUS at 09:28

## 2017-10-02 RX ADMIN — DOXORUBICIN HYDROCHLORIDE 107 MG: 2 INJECTION, SOLUTION INTRAVENOUS at 14:30

## 2017-10-02 RX ADMIN — SODIUM CHLORIDE 20 ML/HR: 0.9 INJECTION, SOLUTION INTRAVENOUS at 08:30

## 2017-10-02 RX ADMIN — ACETAMINOPHEN 650 MG: 325 TABLET ORAL at 09:28

## 2017-10-02 RX ADMIN — VINCRISTINE SULFATE 2 MG: 1 INJECTION, SOLUTION INTRAVENOUS at 14:40

## 2017-10-02 RX ADMIN — DEXAMETHASONE SODIUM PHOSPHATE: 10 INJECTION, SOLUTION INTRAMUSCULAR; INTRAVENOUS at 09:49

## 2017-10-02 RX ADMIN — DIPHENHYDRAMINE HYDROCHLORIDE 25 MG: 50 INJECTION, SOLUTION INTRAMUSCULAR; INTRAVENOUS at 09:29

## 2017-10-02 RX ADMIN — Medication 300 UNITS: at 15:15

## 2017-10-02 RX ADMIN — RITUXIMAB 802 MG: 10 INJECTION, SOLUTION INTRAVENOUS at 10:49

## 2017-10-02 RX ADMIN — CYCLOPHOSPHAMIDE 1605 MG: 2 INJECTION, POWDER, FOR SOLUTION INTRAVENOUS; ORAL at 13:52

## 2017-10-02 NOTE — PROGRESS NOTES
Pt resting with no complaints, vitals stable, stat labs drawn as ordered call bell within reach  Will continue to monitor

## 2017-10-11 DIAGNOSIS — C82.48 FOLLICULAR LYMPHOMA GRADE IIIB OF LYMPH NODES OF MULTIPLE SITES (HCC): ICD-10-CM

## 2017-10-18 ENCOUNTER — TRANSCRIBE ORDERS (OUTPATIENT)
Dept: ADMINISTRATIVE | Facility: HOSPITAL | Age: 47
End: 2017-10-18

## 2017-10-18 DIAGNOSIS — C82.48 FOLLICULAR LYMPHOMA GRADE IIIB OF LYMPH NODES OF MULTIPLE SITES (HCC): Primary | ICD-10-CM

## 2017-10-27 ENCOUNTER — HOSPITAL ENCOUNTER (OUTPATIENT)
Dept: RADIOLOGY | Age: 47
Discharge: HOME | End: 2017-10-27
Payer: COMMERCIAL

## 2017-10-27 DIAGNOSIS — C82.48 FOLLICULAR LYMPHOMA GRADE IIIB OF LYMPH NODES OF MULTIPLE SITES (HCC): ICD-10-CM

## 2017-10-27 LAB — GLUCOSE SERPL-MCNC: 90 MG/DL (ref 65–140)

## 2017-10-27 PROCEDURE — A9552 F18 FDG: HCPCS

## 2017-10-27 PROCEDURE — 78815 PET IMAGE W/CT SKULL-THIGH: CPT

## 2017-10-27 PROCEDURE — 82948 REAGENT STRIP/BLOOD GLUCOSE: CPT

## 2017-10-27 RX ADMIN — IOHEXOL 5 ML: 240 INJECTION, SOLUTION INTRATHECAL; INTRAVASCULAR; INTRAVENOUS; ORAL at 07:20

## 2017-10-31 ENCOUNTER — GENERIC CONVERSION - ENCOUNTER (OUTPATIENT)
Dept: OTHER | Facility: OTHER | Age: 47
End: 2017-10-31

## 2017-11-06 ENCOUNTER — GENERIC CONVERSION - ENCOUNTER (OUTPATIENT)
Dept: OTHER | Facility: OTHER | Age: 47
End: 2017-11-06

## 2017-11-10 ENCOUNTER — HOSPITAL ENCOUNTER (OUTPATIENT)
Dept: INFUSION CENTER | Facility: CLINIC | Age: 47
Discharge: HOME | End: 2017-11-10
Payer: COMMERCIAL

## 2017-11-22 ENCOUNTER — HOSPITAL ENCOUNTER (OUTPATIENT)
Dept: INFUSION CENTER | Facility: CLINIC | Age: 47
Discharge: HOME | End: 2017-11-22
Payer: COMMERCIAL

## 2017-11-22 LAB
ALBUMIN SERPL BCP-MCNC: 3.6 G/DL (ref 3.5–5)
ALP SERPL-CCNC: 116 U/L (ref 46–116)
ALT SERPL W P-5'-P-CCNC: 70 U/L (ref 12–78)
ANION GAP SERPL CALCULATED.3IONS-SCNC: 8 MMOL/L (ref 4–13)
AST SERPL W P-5'-P-CCNC: 26 U/L (ref 5–45)
BASOPHILS # BLD AUTO: 0.04 THOUSANDS/ΜL (ref 0–0.1)
BASOPHILS NFR BLD AUTO: 1 % (ref 0–1)
BILIRUB SERPL-MCNC: 0.6 MG/DL (ref 0.2–1)
BUN SERPL-MCNC: 13 MG/DL (ref 5–25)
CALCIUM SERPL-MCNC: 9.2 MG/DL (ref 8.3–10.1)
CHLORIDE SERPL-SCNC: 107 MMOL/L (ref 100–108)
CO2 SERPL-SCNC: 28 MMOL/L (ref 21–32)
CREAT SERPL-MCNC: 0.86 MG/DL (ref 0.6–1.3)
EOSINOPHIL # BLD AUTO: 0.25 THOUSAND/ΜL (ref 0–0.61)
EOSINOPHIL NFR BLD AUTO: 5 % (ref 0–6)
ERYTHROCYTE [DISTWIDTH] IN BLOOD BY AUTOMATED COUNT: 14.7 % (ref 11.6–15.1)
GFR SERPL CREATININE-BSD FRML MDRD: 103 ML/MIN/1.73SQ M
GLUCOSE SERPL-MCNC: 119 MG/DL (ref 65–140)
HCT VFR BLD AUTO: 40.6 % (ref 36.5–49.3)
HGB BLD-MCNC: 13.9 G/DL (ref 12–17)
LYMPHOCYTES # BLD AUTO: 1.5 THOUSANDS/ΜL (ref 0.6–4.47)
LYMPHOCYTES NFR BLD AUTO: 29 % (ref 14–44)
MCH RBC QN AUTO: 28.1 PG (ref 26.8–34.3)
MCHC RBC AUTO-ENTMCNC: 34.2 G/DL (ref 31.4–37.4)
MCV RBC AUTO: 82 FL (ref 82–98)
MONOCYTES # BLD AUTO: 0.42 THOUSAND/ΜL (ref 0.17–1.22)
MONOCYTES NFR BLD AUTO: 8 % (ref 4–12)
NEUTROPHILS # BLD AUTO: 2.94 THOUSANDS/ΜL (ref 1.85–7.62)
NEUTS SEG NFR BLD AUTO: 57 % (ref 43–75)
PLATELET # BLD AUTO: 154 THOUSANDS/UL (ref 149–390)
PMV BLD AUTO: 9.9 FL (ref 8.9–12.7)
POTASSIUM SERPL-SCNC: 3.9 MMOL/L (ref 3.5–5.3)
PROT SERPL-MCNC: 6.5 G/DL (ref 6.4–8.2)
RBC # BLD AUTO: 4.95 MILLION/UL (ref 3.88–5.62)
SODIUM SERPL-SCNC: 143 MMOL/L (ref 136–145)
WBC # BLD AUTO: 5.15 THOUSAND/UL (ref 4.31–10.16)

## 2017-11-22 PROCEDURE — 80053 COMPREHEN METABOLIC PANEL: CPT | Performed by: INTERNAL MEDICINE

## 2017-11-22 PROCEDURE — 85025 COMPLETE CBC W/AUTO DIFF WBC: CPT | Performed by: INTERNAL MEDICINE

## 2017-11-22 RX ADMIN — Medication 300 UNITS: at 08:22

## 2017-11-22 NOTE — PROGRESS NOTES
Pt to clinic for port a cath lab draw  He will begin maintenance Rituxan in a few weeks  Labs drawn as per orders

## 2017-11-28 ENCOUNTER — ALLSCRIPTS OFFICE VISIT (OUTPATIENT)
Dept: OTHER | Facility: OTHER | Age: 47
End: 2017-11-28

## 2017-11-28 DIAGNOSIS — C82.48 FOLLICULAR LYMPHOMA GRADE IIIB OF LYMPH NODES OF MULTIPLE SITES (HCC): ICD-10-CM

## 2017-11-29 NOTE — PROGRESS NOTES
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; Ordered By:Daniel Patel);   · (1) LD (LDH); Status:Active; Requested for:2017;    Perform:[facility]; [id number]; NDOTGHP;9Y follicular lymphoma of lymph nodes of multiple regions; Ordered By:Daniel Patel); · Follow-up visit in 2 months Evaluation and Treatment  Follow-up  Status: Hold For -Scheduling  Requested for: [id number]   Ordered;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; Ordered By: Laura Chan) Performed:  Due: [id number]    Discussion/Summary  Discussion Summary:   Follicular lymphoma grade 3B when he presented with painful splenomegaly, weight loss, multiple lymphadenopathy in the cervical, axillary area, bone marrow biopsy showed 10% involvement, currently on R-CHOP, received cycle 6 scan after cycle 6  Showed significant reduction in the uptake throughout the lymph nodes, however spleen is enlarged without significant uptake measuring 17 cmrituximab 375 milligram/meter squared every 2 month for total of 2 bhnpg200 mg p o  once a day or twice a daily to prevent herpes zosterin 2 months with CBC, CMP, LDH        Chief Complaint  Chief Complaint Free Text Note Form: Lymphoma      History of Present Illness  HPI: 54-year-old  male with history of abdominal pain, and unintentional weight loss in the past 2 month, was found to have right submandibular lymphadenopathy, needle biopsy was not conclusive, subsequently the patient underwent CAT scan showed bilateral axillary lymph node involvement,In May 2017 at the radiology and [facility] of [place] CAT scan showed splenomegaly, extensive retroperitoneal lymphadenopathy, shotty lymphadenopathy in the inguinal area, Adenopathy in the esophageal gastric junction, bilateral shotty lymphadenopathy in both axillahad normal CBC except for thrombocytopenia with platelet count of 878,874, normal differential, hemoglobin 14 6, WBC 6 6, Normal IgA, IgM, IgG, , Kappa light chain 78, lambda light chain of 17lymph node biopsy showed B-cell lymphoma, positive for CD20, CD10, negative for CD23,biopsy was sent for second opinion at [address] [place] [address]   Previous Therapy:   R-CHOP from June 2017 until October 2017 for total of 6 cycles   Disease Status: Responding after cycle #3 with PET scan showed significant response   Interval History: Was evaluated at [address] [place] [address] and the decision to treat the patient with R CHOP for 6 cycles and then maintenance rituximab every 2 months for total of 2 years      Review of Systems  Complete-Male:  Constitutional: No fever or chills, feels well, no tiredness, no recent weight gain or weight loss,-- no fever,-- not feeling poorly,-- no chills,-- not feeling tired-- and-- no recent weight loss  Eyes: No complaints of eye pain, no red eyes, no discharge from eyes, no itchy eyes  ENT: no complaints of earache, no hearing loss, no nosebleeds, no nasal discharge, no sore throat, no hoarseness  Cardiovascular: No complaints of slow heart rate, no fast heart rate, no chest pain, no palpitations, no leg claudication, no lower extremity  Respiratory: No complaints of shortness of breath, no wheezing, no cough, no SOB on exertion, no orthopnea or PND    Gastrointestinal: No complaints of abdominal pain, no constipation, no nausea or vomiting, no diarrhea or bloody stools  Genitourinary: No complaints of dysuria, no incontinence, no hesitancy, no nocturia, no genital lesion, no testicular pain  Musculoskeletal: No complaints of arthralgia, no myalgias, no joint swelling or stiffness, no limb pain or swelling  Integumentary: No complaints of skin rash or skin lesions, no itching, no skin wound, no dry skin-- and-- no itching  Neurological: No compliants of headache, no confusion, no convulsions, no numbness or tingling, no dizziness or fainting, no limb weakness, no difficulty walking  Psychiatric: anxiety, but-- no depression  Endocrine: No complaints of proptosis, no hot flashes, no muscle weakness, no erectile dysfunction, no deepening of the voice, no feelings of weakness  Hematologic/Lymphatic: no swollen glands,-- no tendency for easy bleeding,-- no tendency for easy bruising-- and-- no swollen glands in the neck  Active Problems    1  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 (202 08) (C82 48)    Current Meds   1  Ondansetron HCl - 4 MG Oral Tablet; TAKE 1 TABLET Every 4 hours PRN; Therapy: [id number] to (Evaluate:12Oct2017)  Requested for: 78QPR1824; Last Rx:[id number] Ordered   2  PredniSONE 50 MG Oral Tablet; Take 2 tablets on Day 1 through Day 5 of chemotherapy treatment; Therapy: [id number] to (Evaluate:12Oct2017)  Requested for: 53JWG1730; Last Rx:[id number] Ordered    Vitals  Vital Signs    Recorded: 28Nov2017 08:29AM   Temperature 97 1 F   Heart Rate 71   Respiration 16   Systolic 125   Diastolic 78   Height 6 ft 1 in   Weight 213 lb 4 0 oz   BMI Calculated 28 14   BSA Calculated 2 21   O2 Saturation 98       Physical Exam   Constitutional  General appearance: No acute distress, well appearing and well nourished  Eyes  Conjunctiva and lids: No swelling, erythema, or discharge  Pupils and irises: Equal, round and reactive to light     Ears, Nose, Mouth, and Throat  External inspection of ears and nose: Normal    Oropharynx: Normal with no erythema, edema, exudate or lesions  Pulmonary  Auscultation of lungs: Clear to auscultation, equal breath sounds bilaterally, no wheezes, no rales, no rhonci  Cardiovascular  Auscultation of heart: Normal rate and rhythm, normal S1 and S2, without murmurs  Examination of extremities for edema and/or varicosities: Normal    Carotid pulses: Normal    Abdomen  Abdomen: Non-tender, no masses  Liver and spleen: Abnormal    Lymphatic  Palpation of lymph nodes in neck: No lymphadenopathy  Musculoskeletal  Gait and station: Normal    Digits and nails: Normal without clubbing or cyanosis  Inspection/palpation of joints, bones, and muscles: Normal    Skin  Skin and subcutaneous tissue: Normal without rashes or lesions  Neurologic  Cranial nerves: Cranial nerves 2-12 intact  Psychiatric  Orientation to person, place and time: Normal    Mood and affect: Normal         ECOG 0       Results/Data  (1) COMPREHENSIVE METABOLIC PANEL [id number] 79:58UL Heath Diaz     Test Name Result Flag Reference   GLUCOSE,RANDM 119 mg/dL       If the patient is fasting, the ADA then defines impaired fasting glucose as > 100 mg/dL and diabetes as > or equal to 123 mg/dL  Specimen collection should occur prior to Sulfasalazine administration due to the potential for falsely depressed results  Specimen collection should occur prior to Sulfapyridine administration due to the potential for falsely elevated results     SODIUM 143 mmol/L  136-145   POTASSIUM 3 9 mmol/L  3 5-5 3   CHLORIDE 107 mmol/L  100-108   CARBON DIOXIDE 28 mmol/L  21-32   ANION GAP (CALC) 8 mmol/L  4-13   BLOOD UREA NITROGEN 13 mg/dL  5-25   CREATININE 0 86 mg/dL  0 60-1 30   Standardized to IDMS reference method   CALCIUM 9 2 mg/dL  8 3-10 1   BILI, TOTAL 0 60 mg/dL  0 20-1 00   ALK PHOSPHATAS 116 U/L     ALT (SGPT) 70 U/L  12-78   Specimen collection should occur prior to Sulfasalazine administration due to the potential for falsely depressed results  AST(SGOT) 26 U/L  5-45   Specimen collection should occur prior to Sulfasalazine administration due to the potential for falsely depressed results  ALBUMIN 3 6 g/dL  3 5-5 0   TOTAL PROTEIN 6 5 g/dL  6 4-8 2   eGFR 103 ml/min/1 73sq m       [facility] Disease Education Program recommendations are as follows: GFR calculation is accurate only with a steady state creatinine Chronic Kidney disease less than 60 ml/min/1 73 sq  meters Kidney failure less than 15 ml/min/1 73 sq  meters  (1) CBC/PLT/DIFF [id number] 08:22AM Cindy Perez     Test Name Result Flag Reference   WBC COUNT 5 15 Thousand/uL  4 31-10 16   RBC COUNT 4 95 Million/uL  3 88-5 62   HEMOGLOBIN 13 9 g/dL  12 0-17 0   HEMATOCRIT 40 6 %  36 5-49 3   MCV 82 fL  82-98   MCH 28 1 pg  26 8-34 3   MCHC 34 2 g/dL  31 4-37 4   RDW 14 7 %  11 6-15 1   MPV 9 9 fL  8 9-12 7   PLATELET COUNT 576 Thousands/uL  149-390   NEUTROPHILS RELATIVE PERCENT 57 %  43-75   LYMPHOCYTES RELATIVE PERCENT 29 %  14-44   MONOCYTES RELATIVE PERCENT 8 %  4-12   EOSINOPHILS RELATIVE PERCENT 5 %  0-6   BASOPHILS RELATIVE PERCENT 1 %  0-1   NEUTROPHILS ABSOLUTE COUNT 2 94 Thousands/? ??L  1 85-7 62   LYMPHOCYTES ABSOLUTE COUNT 1 50 Thousands/? ??L  0 60-4 47   MONOCYTES ABSOLUTE COUNT 0 42 Thousand/? ??L  0 17-1 22   EOSINOPHILS ABSOLUTE COUNT 0 25 Thousand/? ??L  0 00-0 61   BASOPHILS ABSOLUTE COUNT 0 04 Thousands/? ??L  0 00-0 10     * NM PET CT SKULL BASE TO MID THIGH 27Oct2017 06:47AM EPIC, Provider   Test ordered by: Cindy Ibanez     Test Name Result Flag Reference   NM PET CT SKULL BASE TO MID THIGH (Report)       PET/CT SCAN   INDICATION: C82 48: Follicular lymphoma grade iiib, lymph nodes of multiple sites  History taken directly from the electronic ordering system   Restaging postchemotherapy for treatment evaluation   MODIFIER:   PS    COMPARISON: PET CT 8/4/2017 and priors   CELL TYPE: Follicular lymphoma, retroperitoneal node biopsy 5/18/2017 TECHNIQUE:  8 7 mCi F-18-FDG administered IV  Multiplanar attenuation corrected and non attenuation corrected PET images are available for interpretation, and contiguous, low dose, axial CT sections were obtained from the skull base through the femurs   following the administration of oral contrast material (7 5 cc Omnipaque-240 in 300 cc water)  Intravenous contrast material was not utilized  This examination, like all CT scans performed in the [facility], was performed utilizing   techniques to minimize radiation dose exposure, including the use of iterative reconstruction and automated exposure control  Fasting serum glucose: 90 mg/dl   FINDINGS:   Mediastinal SUV 3 2  Liver activity SUV 3 4  VISUALIZED BRAIN:    Large posterior fossa extra-axial cystic space appears similar, otherwise suboptimally evaluated  No acute abnormalities are seen  HEAD/NECK:    There is a physiologic distribution of FDG  There now remains only scattered small subcentimeter cervical nodes bilaterally, without significant hypermetabolism  No new FDG avid cervical adenopathy is seen  CT images: Stable right maxillary sinus opacification  CHEST:    Interval decreased bilateral axillary and mediastinal adenopathy, now measuring less than 1 cm, without significant hypermetabolism, appearing similar to background soft tissue activity  For example, a left axillary node image 93 measures 5 x 7 mm, SUV   0 6  Prior measurement 1 x 1 7 cm, SUV 2 8  No new hypermetabolic lesions  Persistent 5 mm nodular density right lower lung, series 3 image 117, too small for accurate PET evaluation  Tiny nodular density right lower lung near the major fissure also is similar  CT images: Otherwise stable  ABDOMEN:    There remains splenomegaly, measuring 17 x 7 1 cm in the coronal plane, prior measurement 18 x 7 2 cm  However there are no focal splenic hypermetabolic lesions, with an SUV of 2 6, prior SUV 2 4     Interval decrease retroperitoneal, peripancreatic and mesenteric adenopathy, compatible with response to therapy  Nodes measure less than 1 cm, with FDG uptake similar to background soft tissue activity  For example, a small node to the left of the SMA  image 169 measures 1 2 x 0 7 cm, SUV 1 2  Prior measurement 1 7 x 1 27 m, SUV 2 3  No new FDG avid soft tissue lesions are seen  CT images: Otherwise stable  PELVIS:   Interval decreased pelvic and inguinal hypermetabolic adenopathy, compatible with response to therapy, now measuring less than 1 cm and appearing similar in intensity to background soft tissue activity  For example, a left internal iliac node image 250   measures 1 3 x 0 67 m, SUV 1 1  Prior measurement 1 9 x 0 8 cm, SUV 3 4  No new FDG avid soft tissue lesions are seen  CT images: Otherwise stable  OSSEOUS STRUCTURES:  Stable well-circumscribed cystic lesion with sclerotic borders in the proximal right femur, measuring 2 7 x 1 6 cm, with mild FDG activity, SUV 3 5  Prior SUV 3 6  CT images: Otherwise stable  IMPRESSION:  1  Interval decreased widespread hypermetabolic adenopathy, with small subcentimeter nodes appearing similar to background soft tissue activity, compatible with response to therapy  There are no new hypermetabolic lesions  This would correspond to a   Deauville score of 1   2  Stable 2 7 x 1 6 cm well-circumscribed cystic lesion in the proximal right femur, with mild FDG activity, SUV 3 5  MR characterization suggested  Workstation performed: [id number]   Signed by:  Surya Brewer MD  10/27/17       Signatures   Electronically signed by :  AURELIA Polk ; Nov 28 2017  8:52AM EST                       (Author)

## 2017-12-14 RX ORDER — ACETAMINOPHEN 325 MG/1
650 TABLET ORAL ONCE
Status: COMPLETED | OUTPATIENT
Start: 2017-12-15 | End: 2017-12-15

## 2017-12-14 RX ORDER — SODIUM CHLORIDE 9 MG/ML
20 INJECTION, SOLUTION INTRAVENOUS CONTINUOUS
Status: DISCONTINUED | OUTPATIENT
Start: 2017-12-15 | End: 2017-12-18 | Stop reason: HOSPADM

## 2017-12-15 ENCOUNTER — HOSPITAL ENCOUNTER (OUTPATIENT)
Dept: INFUSION CENTER | Facility: CLINIC | Age: 47
Discharge: HOME | End: 2017-12-15
Payer: COMMERCIAL

## 2017-12-15 VITALS
DIASTOLIC BLOOD PRESSURE: 78 MMHG | WEIGHT: 208.5 LBS | HEIGHT: 73 IN | OXYGEN SATURATION: 98 % | RESPIRATION RATE: 18 BRPM | BODY MASS INDEX: 27.63 KG/M2 | HEART RATE: 63 BPM | SYSTOLIC BLOOD PRESSURE: 112 MMHG | TEMPERATURE: 97.5 F

## 2017-12-15 PROCEDURE — 96413 CHEMO IV INFUSION 1 HR: CPT

## 2017-12-15 PROCEDURE — 96375 TX/PRO/DX INJ NEW DRUG ADDON: CPT

## 2017-12-15 PROCEDURE — 96367 TX/PROPH/DG ADDL SEQ IV INF: CPT

## 2017-12-15 PROCEDURE — 96415 CHEMO IV INFUSION ADDL HR: CPT

## 2017-12-15 RX ADMIN — ACETAMINOPHEN 650 MG: 325 TABLET ORAL at 08:55

## 2017-12-15 RX ADMIN — DIPHENHYDRAMINE HYDROCHLORIDE 25 MG: 50 INJECTION, SOLUTION INTRAMUSCULAR; INTRAVENOUS at 08:55

## 2017-12-15 RX ADMIN — Medication 300 UNITS: at 13:57

## 2017-12-15 RX ADMIN — SODIUM CHLORIDE 20 ML/HR: 0.9 INJECTION, SOLUTION INTRAVENOUS at 08:45

## 2017-12-15 RX ADMIN — HYDROCORTISONE SODIUM SUCCINATE 100 MG: 100 INJECTION, POWDER, FOR SOLUTION INTRAMUSCULAR; INTRAVENOUS at 08:55

## 2017-12-15 RX ADMIN — RITUXIMAB 802 MG: 10 INJECTION, SOLUTION INTRAVENOUS at 10:03

## 2017-12-15 NOTE — PROGRESS NOTES
Spoke with Preeti Wetzel regarding infusion rate of patients rituxan  Patient stated he had been getting Rituxan at increased subsequent rate and tolerating well  OK to run rituxan at increased rate  All subsequent infusions will be at increased rate also as patient tolerates

## 2018-01-10 NOTE — MISCELLANEOUS
Message   Recorded as Task   Date: 05/23/2017 04:22 PM, Created By: Archana Ramirez   Task Name: Medical Complaint Callback   Assigned To: Wally Villanueva   Regarding Patient: Glenn Mello, Status: In Progress   Comment:    JuanElva cash - 23 May 2017 4:22 PM     TASK CREATED  Caller: Self; Medical Complaint; [phone] (Home)  SPOKE TO YOU EARLIER - HAS SOME ADDITIONAL INFO - OVER THE WEEKEND HE HAD BLOOD IN HIS STOOL, WHAT IS THE NEXT STEP? ALSO, LAST NIGHT WAS HIS FIRST NIGHT THAT HE HAD A REALLY BAD NIGHT SWEAT    PLEASE CALL TO Garo Brumfield - 23 May 2017 4:25 PM     TASK IN PROGRESS   Lisa Hannon - 24 May 2017 8:15 AM     TASK EDITED  spoke with Arcenio Burns, lab supervisor  she will contact pathologist to get an update on the status of the case  I spoke with patient  He had one episode of "sweating" the night before last, none last night or ever before  patient had one episode of "blood" in toilet after passing a stool, stool was brown in color  patient admits to having hemorrhoids  patient will call if symptoms persist  I updated patient on status of pathology        Signatures   Electronically signed by : Kirstin Gutierres, ; May 24 2017  8:15AM EST                       (Author)

## 2018-01-11 NOTE — MISCELLANEOUS
Message   Recorded as Task   Date: 05/19/2017 08:28 AM, Created By: Monica Romano   Task Name: Follow Up   Assigned To: Martha Seat   Regarding Patient: Cassi Hsieh, Status: Active   CommentAdora Abo - 19 May 2017 8:28 AM     TASK CREATED  Caller: Luis Isidro; Care Coordination; [phone]  Pt is scheduled as a new pt with Dr Kofi Ward on 6/2  Had biopsy done yesterday and was told by our office that it would take 2 weeks for the results to come back and they had to wait until 6/2 to find out  The IR doctor told the patient the results would be back in 3-5 days and that they should call our office to find out the results PRIOR to the appointment, stating that the only reason the patient wasn't being seen sooner was because Dr Kofi Ward would be on vacation  Wife is very upset by this news and is asking for a call with results when they are available  Long,Lisa - 19 May 2017 8:42 AM     TASK EDITED  spoke cristobal Palomino  i assured her that as soon as the biopsy results come in I will alert Dr Jasmine Alva   He will review andthe office get back to patient with results    patient's wife is in agreement with plan        Signatures   Electronically signed by : Mina Jang, ; May 19 2017  8:42AM EST                       (Author)

## 2018-01-12 VITALS
HEIGHT: 73 IN | HEART RATE: 80 BPM | BODY MASS INDEX: 26.77 KG/M2 | WEIGHT: 202 LBS | TEMPERATURE: 98 F | RESPIRATION RATE: 16 BRPM

## 2018-01-12 VITALS
OXYGEN SATURATION: 95 % | HEART RATE: 80 BPM | BODY MASS INDEX: 26.24 KG/M2 | WEIGHT: 198 LBS | HEIGHT: 73 IN | TEMPERATURE: 98.2 F | DIASTOLIC BLOOD PRESSURE: 80 MMHG | RESPIRATION RATE: 16 BRPM | SYSTOLIC BLOOD PRESSURE: 120 MMHG

## 2018-01-12 VITALS
TEMPERATURE: 97.1 F | DIASTOLIC BLOOD PRESSURE: 78 MMHG | SYSTOLIC BLOOD PRESSURE: 110 MMHG | WEIGHT: 213.25 LBS | HEART RATE: 71 BPM | RESPIRATION RATE: 16 BRPM | OXYGEN SATURATION: 98 % | HEIGHT: 73 IN | BODY MASS INDEX: 28.26 KG/M2

## 2018-01-12 NOTE — MISCELLANEOUS
Message  patient calld  anxious about hearing from IR with instruction for biopsy on 5/18/17  I informed patient that IR nurse will call him this afternoon with information  emotional support offered as this is a stressful time for patient   patient also wants to speak to dr Morey Shone about results of lab tests  message given to Samra  i did explain to patient that Dr Morey Shone is rounding and will call him when he had a moment, could not give patient a time frame  patient verbalized understanding of the plan       Signatures   Electronically signed by : Armanda Lesch, ; May 16 2017  4:11PM EST                       (Author)

## 2018-01-14 NOTE — MISCELLANEOUS
Message  spoke with pathologist  initials stains show "what looks to be" a follicular lymphoma grade 2  she will juan daniel to do a few more stainds and should have a final report by tomorrow  patient aware      Signatures   Electronically signed by : George Haywood, ; May 24 2017 12:53PM EST                       (Author)

## 2018-01-14 NOTE — MISCELLANEOUS
Message   Recorded as Task   Date: 05/16/2017 09:23 AM, Created By: Melvin Templeton   Task Name: Call Back   Assigned To: Lisa Hannon   Regarding Patient: Donna Blount, Status: Active   Comment:    Melvin Templeton - 16 May 2017 9:23 AM     TASK CREATED  Caller: Self; Other; [phone] (Mobile Phone)  PATIENT IS SCHEDULED FOR A BIOPSY ON THURSDAY AND WAS JUST WANTING TO KNOW WHAT HE NEEDS TO DO OR HOW TO PREPARE  HE SAYS SOMEONE WAS SUPPOSED TO GIVE HIM A CALL YESTERDAY BUT HE NEVER HEARD ANYTHING  Lisa Hannon - 16 May 2017 9:51 AM     TASK EDITED  can you take care of that/  Thanks!!! Valeria Arreola - 16 May 2017 10:10 AM     TASK EDITED  called and spoke to [facility] ir dept, she checked with the [facility] ir nurse, they were not able to make the phone consultation call on monday, they will definately get one by the end of their working day  a message stating above was left on pt's cell #     Lisa Hannon - 16 May 2017 10:16 AM     TASK EDITED        Signatures   Electronically signed by : Letty Guerrero, ; May 16 2017 10:16AM EST                       (Author)

## 2018-01-15 VITALS
OXYGEN SATURATION: 98 % | BODY MASS INDEX: 26.92 KG/M2 | TEMPERATURE: 97.6 F | HEIGHT: 73 IN | DIASTOLIC BLOOD PRESSURE: 70 MMHG | WEIGHT: 203.13 LBS | SYSTOLIC BLOOD PRESSURE: 110 MMHG | RESPIRATION RATE: 16 BRPM | HEART RATE: 76 BPM

## 2018-01-16 NOTE — MISCELLANEOUS
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

## 2018-01-16 NOTE — MISCELLANEOUS
Message   Recorded as Task   Date: 05/23/2017 08:24 AM, Created By: Hanna Truong   Task Name: Call Back   Assigned To: Lisa Hannon   Regarding Patient: Tika Lung, Status:  In Progress   Comment:    Macy Rubio - 23 May 2017 8:24 AM     TASK CREATED  PT called in regards to  Biopsy results    Lisa Hannon - 23 May 2017 8:47 AM     TASK EDITED   Kylie Bee - 23 May 2017 8:47 AM     TASK IN PROGRESS   Lisa Hannon - 23 May 2017 8:48 AM     TASK EDITED  biopsy is preliminary  will have PA review and get back to patient   Lisa Hannon - 23 May 2017 10:03 AM     TASK EDITED  flow cytometry is showing low grade lymphoma per PA  patient and wife(Arin) aware  once final path is in I will show dr Chris Braga and call patient        Signatures   Electronically signed by : Malika Metcalf, ; May 23 2017 10:03AM EST                       (Author)

## 2018-01-16 NOTE — MISCELLANEOUS
Message   Recorded as Task   Date: 11/03/2017 03:20 PM, Created By: Jadiel Rose   Task Name: Call Back   Assigned To: Tammy Vasquez   Regarding Patient: Mary Ann Leader, Status: In Progress   Alvin Oas - 03 Nov 2017 3:20 PM     TASK CREATED  PT called and wants to schedlue his next chemo    [phone]   Harmony Civil - 03 Nov 2017 4:05 PM     TASK IN PROGRESS   Tammy Vasquez - 06 Nov 2017 8:22 AM     TASK REPLIED TO: Previously Assigned To Mackenzie Kowalski, this pt states he is going on maintanance Rituximab q 2-3 months and wants me to schedule it  What is the dose and how long does it run over? Lisa Hannon - 06 Nov 2017 8:36 AM     TASK REPLIED TO: Previously Assigned To Lisa Hannon  750 mg    tylenol 650  benadry 25   solucortef 100    q 2 month    4 hours    Thanks!!   Tammy Vasquez - 06 Nov 2017 9:43 AM     TASK REPLIED TO: Previously Assigned To [place]! He's all set for 12/15 and 2/16 at 8:30am for his maint  chemo  He also was questioning if he should be taking an Anti-viral to avoid shingles  His PCP mentioned that he should ask Dr Nolan Bruno about it  Let me know and I'll call him back with the infusion dates  Thanks! Lisa Hannon - 06 Nov 2017 9:50 AM     TASK REPLIED TO: Previously Assigned To Lisa Hannon  ANTI VIRAL NOT NECESSARY    REMIND HIM OF HIS F/U WITH DR Kareem Baez AS WELL  THANKS!!!   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

## 2018-01-17 NOTE — MISCELLANEOUS
Message   Recorded as Task   Date: 05/26/2017 09:07 AM, Created By: Dustin Pathak   Task Name: Care Coordination   Assigned To: Brayan Talbert   Regarding Patient: Chapo Patel, Status: In Progress   Giselle Triana - 26 May 2017 9:07 Jerry Guerrero from pathology called regarding pts biopsy    There seems to be a disagreement within the dept regarding pts diagnosis so the tissue is being sent out for another opinion   Lisa Hannon - 26 May 2017 9:16 AM     TASK IN PROGRESS   Lisa Hannon - 26 May 2017 9:17 AM     TASK EDITED        Signatures   Electronically signed by : Ketty Del Valle, ; May 26 2017  9:18AM EST                       (Author)

## 2018-01-22 VITALS
HEART RATE: 82 BPM | SYSTOLIC BLOOD PRESSURE: 140 MMHG | RESPIRATION RATE: 16 BRPM | TEMPERATURE: 97.2 F | BODY MASS INDEX: 27.07 KG/M2 | DIASTOLIC BLOOD PRESSURE: 82 MMHG | HEIGHT: 73 IN | OXYGEN SATURATION: 97 % | WEIGHT: 204.25 LBS

## 2018-01-22 VITALS
OXYGEN SATURATION: 97 % | DIASTOLIC BLOOD PRESSURE: 82 MMHG | RESPIRATION RATE: 16 BRPM | TEMPERATURE: 96.8 F | SYSTOLIC BLOOD PRESSURE: 140 MMHG | HEIGHT: 73 IN | HEART RATE: 81 BPM | BODY MASS INDEX: 27.32 KG/M2 | WEIGHT: 206.13 LBS

## 2018-01-31 ENCOUNTER — TRANSCRIBE ORDERS (OUTPATIENT)
Dept: HEMATOLOGY ONCOLOGY | Facility: CLINIC | Age: 48
End: 2018-01-31

## 2018-01-31 ENCOUNTER — OFFICE VISIT (OUTPATIENT)
Dept: HEMATOLOGY ONCOLOGY | Facility: CLINIC | Age: 48
End: 2018-01-31
Payer: COMMERCIAL

## 2018-01-31 VITALS
DIASTOLIC BLOOD PRESSURE: 80 MMHG | WEIGHT: 212 LBS | RESPIRATION RATE: 16 BRPM | SYSTOLIC BLOOD PRESSURE: 110 MMHG | HEIGHT: 73 IN | HEART RATE: 76 BPM | BODY MASS INDEX: 28.1 KG/M2 | TEMPERATURE: 96 F

## 2018-01-31 DIAGNOSIS — C82.49: Primary | ICD-10-CM

## 2018-01-31 PROCEDURE — 99214 OFFICE O/P EST MOD 30 MIN: CPT | Performed by: INTERNAL MEDICINE

## 2018-01-31 NOTE — PROGRESS NOTES
Hematology Outpatient Follow - Up Note  Noah Contreras 51 y o  male MRN: @ Encounter: [id number]        Date:  1/31/2018        Assessment/ Plan:    1  Stage IV follicular lymphoma grade 3B when he presented with splenomegaly, weight loss, multiple lymphadenopathy, bone marrow involvement with 10%, excisional biopsy of the retroperitoneal lymph node confirmed the diagnosis with 2nd opinion at [facility] positive for CD 20, CD 10 and negative for CD 23   He was treated in May 2017 with R-CHOP for a total of 6 cycles, subsequent CT/PET scan showed no evidence of disease, currently on maintenance rituximab every 2 months, proceed with dose 2 , I do not need blood work with every rituximab I will do blood work every 4 months with CBC, CMP, LDH and office visit  2  Flush the Port-A-Cath every 2 months while he is getting rituximab  3  Expedite the rituximab infusion time according to the protocol          HPI:   50-year-old  male with history of abdominal pain, weight loss prior to the presentation in 2016, he was found to have right submandibular lymphadenopathy, needle biopsy was not conclusive, subsequently CT scan showed bilateral axillary lymphadenopathy in May 2017 also splenomegaly, extensive retroperitoneal lymphadenopathy, adenopathy in the esophageal gastric junction, bilateral shotty lymphadenopathy in the both axilla, he had normal CBC except for thrombocytopenia with platelet count of 825100 normal differential, normal IgM, IgG, IgA, LDH was 166, kappa light chain of 78, lambda light chain of 17, he had retroperitoneal lymph node biopsy showed B-cell lymphoma, positive for CD 20, CD 10, negative for CD 23, the biopsy sent for 2nd opinion at [facility], showed grade 3 B follicular lymphoma  A bone marrow biopsy showed 10% involvement with lymphoma  He was treated with R-CHOP from June 17 until October 2017 for total of 6 cycles, with PET scan after cycle 3   Showed significant response, repeat PET scan after 6 cycles of chemotherapy showed no evidence of disease  Currently on maintenance rituximab every 2 months initiated in November 2017 for total of 2 years    Interval History:        Previous Treatment:         Test Results:    Imaging: No results found  Labs:   Lab Results   Component Value Date    WBC 5 15 11/22/2017    HGB 13 9 11/22/2017    HCT 40 6 11/22/2017    MCV 82 11/22/2017     11/22/2017     Lab Results   Component Value Date     11/22/2017    K 3 9 11/22/2017     11/22/2017    CO2 28 11/22/2017    ANIONGAP 8 11/22/2017    BUN 13 11/22/2017    CREATININE 0 86 11/22/2017    GLUCOSE 119 11/22/2017    GLUF 106 (H) 09/29/2017    CALCIUM 9 2 11/22/2017    AST 26 11/22/2017    ALT 70 11/22/2017    ALKPHOS 116 11/22/2017    PROT 6 5 11/22/2017    BILITOT 0 60 11/22/2017    EGFR 103 11/22/2017       No results found for: IRON, TIBC, FERRITIN    No results found for: [id number]      ROS:   Review of Systems   Constitutional: Negative  HENT: Negative  Eyes: Negative  Respiratory: Negative  Negative for shortness of breath  Cardiovascular: Negative  Gastrointestinal: Negative  Negative for abdominal pain, blood in stool, constipation and diarrhea  Genitourinary: Negative  Musculoskeletal: Negative  Neurological: Negative  Hematological: Negative for adenopathy  Does not bruise/bleed easily  All other systems reviewed and are negative  Current Medications: Reviewed  Allergies: Reviewed  PMH/FH/SH:  Reviewed      Physical Exam:    Body surface area is 2 21 meters squared      Wt Readings from Last 3 Encounters:   01/31/18 96 2 kg (212 lb)   12/15/17 94 6 kg (208 lb 8 oz)   11/28/17 96 7 kg (213 lb 4 oz)        Temp Readings from Last 3 Encounters:   01/31/18 (!) 96 °F (35 6 °C) (Tympanic)   12/15/17 97 5 °F (36 4 °C) (Oral)   11/28/17 (!) 97 1 °F (36 2 °C)        BP Readings from Last 3 Encounters:   01/31/18 110/80 12/15/17 112/78   11/28/17 110/78         Pulse Readings from Last 3 Encounters:   01/31/18 76   12/15/17 63   11/28/17 71        Physical Exam   Constitutional: He appears well-developed  HENT:   Head: Normocephalic  Mouth/Throat: No oropharyngeal exudate  Eyes: Pupils are equal, round, and reactive to light  Neck: Normal range of motion  Cardiovascular: Normal rate and regular rhythm  Pulmonary/Chest: Effort normal    Abdominal: Soft  Bowel sounds are normal  He exhibits no distension  There is no tenderness  There is no rebound  Musculoskeletal: Normal range of motion  Lymphadenopathy:     He has no cervical adenopathy  Skin: Skin is warm  No rash noted  No erythema  Psychiatric: He has a normal mood and affect  Goals and Barriers:  Current Goal:  Increase progression free survival    Barriers: None  Patient's Capacity to Self Care:  Patient is able to self care      Code Status: [unfilled]

## 2018-01-31 NOTE — LETTER
January 31, 2018     Wendy Pickett, [address]    Patient: Omer Ruano   YOB: 1970   Date of Visit: 1/31/2018       Dear Dr Karen Aguayo:    Thank you for referring Omer Ruano to me for evaluation  Below are my notes for this consultation  If you have questions, please do not hesitate to call me  I look forward to following your patient along with you  Sincerely,        Jayce Ovalle MD        CC: MD Jayce Ross MD  1/31/2018  9:01 AM  Sign at close encounter  Hematology Outpatient Follow - Up Note  Omer Ruano 51 y o  male MRN: @ Encounter: [id number]        Date:  1/31/2018        Assessment/ Plan:    1  Stage IV follicular lymphoma grade 3B when he presented with splenomegaly, weight loss, multiple lymphadenopathy, bone marrow involvement with 10%, excisional biopsy of the retroperitoneal lymph node confirmed the diagnosis with 2nd opinion at [address] [place] [address] positive for CD 20, CD 10 and negative for CD 23   He was treated in May 2017 with R-CHOP for a total of 6 cycles, subsequent CT/PET scan showed no evidence of disease, currently on maintenance rituximab every 2 months, proceed with dose 2 , I do not need blood work with every rituximab I will do blood work every 4 months with CBC, CMP, LDH and office visit  2  Flush the Port-A-Cath every 2 months while he is getting rituximab  3   Expedite the rituximab infusion time according to the protocol          HPI:   50-year-old  male with history of abdominal pain, weight loss prior to the presentation in 2016, he was found to have right submandibular lymphadenopathy, needle biopsy was not conclusive, subsequently CT scan showed bilateral axillary lymphadenopathy in May 2017 also splenomegaly, extensive retroperitoneal lymphadenopathy, adenopathy in the esophageal gastric junction, bilateral shotty lymphadenopathy in the both axilla, he had normal CBC except for thrombocytopenia with platelet count of [id number] normal differential, normal IgM, IgG, IgA, LDH was 166, kappa light chain of 78, lambda light chain of 17, he had retroperitoneal lymph node biopsy showed B-cell lymphoma, positive for CD 20, CD 10, negative for CD 23, the biopsy sent for 2nd opinion at [address] [place] [address], showed grade 3 B follicular lymphoma  A bone marrow biopsy showed 10% involvement with lymphoma  He was treated with R-CHOP from June 17 until October 2017 for total of 6 cycles, with PET scan after cycle 3  Showed significant response, repeat PET scan after 6 cycles of chemotherapy showed no evidence of disease  Currently on maintenance rituximab every 2 months initiated in November 2017 for total of 2 years    Interval History:        Previous Treatment:         Test Results:    Imaging: No results found  Labs:   Lab Results   Component Value Date    WBC 5 15 11/22/2017    HGB 13 9 11/22/2017    HCT 40 6 11/22/2017    MCV 82 11/22/2017     11/22/2017     Lab Results   Component Value Date     11/22/2017    K 3 9 11/22/2017     11/22/2017    CO2 28 11/22/2017    ANIONGAP 8 11/22/2017    BUN 13 11/22/2017    CREATININE 0 86 11/22/2017    GLUCOSE 119 11/22/2017    GLUF 106 (H) 09/29/2017    CALCIUM 9 2 11/22/2017    AST 26 11/22/2017    ALT 70 11/22/2017    ALKPHOS 116 11/22/2017    PROT 6 5 11/22/2017    BILITOT 0 60 11/22/2017    EGFR 103 11/22/2017       No results found for: IRON, TIBC, FERRITIN    No results found for: [id number]      ROS:   Review of Systems   Constitutional: Negative  HENT: Negative  Eyes: Negative  Respiratory: Negative  Negative for shortness of breath  Cardiovascular: Negative  Gastrointestinal: Negative  Negative for abdominal pain, blood in stool, constipation and diarrhea  Genitourinary: Negative  Musculoskeletal: Negative  Neurological: Negative  Hematological: Negative for adenopathy  Does not bruise/bleed easily  All other systems reviewed and are negative  Current Medications: Reviewed  Allergies: Reviewed  PMH/FH/SH:  Reviewed      Physical Exam:    Body surface area is 2 21 meters squared  Wt Readings from Last 3 Encounters:   01/31/18 96 2 kg (212 lb)   12/15/17 94 6 kg (208 lb 8 oz)   11/28/17 96 7 kg (213 lb 4 oz)        Temp Readings from Last 3 Encounters:   01/31/18 (!) 96 °F (35 6 °C) (Tympanic)   12/15/17 97 5 °F (36 4 °C) (Oral)   11/28/17 (!) 97 1 °F (36 2 °C)        BP Readings from Last 3 Encounters:   01/31/18 110/80   12/15/17 112/78   11/28/17 110/78         Pulse Readings from Last 3 Encounters:   01/31/18 76   12/15/17 63   11/28/17 71        Physical Exam   Constitutional: He appears well-developed  HENT:   Head: Normocephalic  Mouth/Throat: No oropharyngeal exudate  Eyes: Pupils are equal, round, and reactive to light  Neck: Normal range of motion  Cardiovascular: Normal rate and regular rhythm  Pulmonary/Chest: Effort normal    Abdominal: Soft  Bowel sounds are normal  He exhibits no distension  There is no tenderness  There is no rebound  Musculoskeletal: Normal range of motion  Lymphadenopathy:     He has no cervical adenopathy  Skin: Skin is warm  No rash noted  No erythema  Psychiatric: He has a normal mood and affect  Goals and Barriers:  Current Goal:  Increase progression free survival    Barriers: None  Patient's Capacity to Self Care:  Patient is able to self care      Code Status: [unfilled]

## 2018-02-15 RX ORDER — ACETAMINOPHEN 325 MG/1
650 TABLET ORAL ONCE
Status: DISCONTINUED | OUTPATIENT
Start: 2018-02-16 | End: 2018-02-15

## 2018-02-15 RX ORDER — SODIUM CHLORIDE 9 MG/ML
20 INJECTION, SOLUTION INTRAVENOUS CONTINUOUS
Status: DISCONTINUED | OUTPATIENT
Start: 2018-02-16 | End: 2018-02-15

## 2018-02-16 ENCOUNTER — HOSPITAL ENCOUNTER (OUTPATIENT)
Dept: INFUSION CENTER | Facility: CLINIC | Age: 48
Discharge: HOME | End: 2018-02-16
Payer: COMMERCIAL

## 2018-02-20 ENCOUNTER — TELEPHONE (OUTPATIENT)
Dept: HEMATOLOGY ONCOLOGY | Facility: CLINIC | Age: 48
End: 2018-02-20

## 2018-02-20 NOTE — TELEPHONE ENCOUNTER
Wanted to f/u about getting his infusion appt for 2 23 18 approved with his insurance   Req a call back

## 2018-02-21 RX ORDER — SODIUM CHLORIDE 9 MG/ML
20 INJECTION, SOLUTION INTRAVENOUS CONTINUOUS
Status: DISCONTINUED | OUTPATIENT
Start: 2018-02-23 | End: 2018-02-26 | Stop reason: HOSPADM

## 2018-02-21 RX ORDER — ACETAMINOPHEN 325 MG/1
650 TABLET ORAL ONCE
Status: COMPLETED | OUTPATIENT
Start: 2018-02-23 | End: 2018-02-23

## 2018-02-23 ENCOUNTER — HOSPITAL ENCOUNTER (OUTPATIENT)
Dept: INFUSION CENTER | Facility: CLINIC | Age: 48
Discharge: HOME | End: 2018-02-23
Payer: COMMERCIAL

## 2018-02-23 VITALS
SYSTOLIC BLOOD PRESSURE: 118 MMHG | DIASTOLIC BLOOD PRESSURE: 80 MMHG | HEIGHT: 73 IN | BODY MASS INDEX: 27.43 KG/M2 | HEART RATE: 70 BPM | OXYGEN SATURATION: 97 % | WEIGHT: 207 LBS | TEMPERATURE: 98.1 F | RESPIRATION RATE: 20 BRPM

## 2018-02-23 PROCEDURE — 96413 CHEMO IV INFUSION 1 HR: CPT

## 2018-02-23 PROCEDURE — 96367 TX/PROPH/DG ADDL SEQ IV INF: CPT

## 2018-02-23 PROCEDURE — 96375 TX/PRO/DX INJ NEW DRUG ADDON: CPT

## 2018-02-23 PROCEDURE — 96415 CHEMO IV INFUSION ADDL HR: CPT

## 2018-02-23 RX ADMIN — RITUXIMAB 802 MG: 10 INJECTION, SOLUTION INTRAVENOUS at 09:36

## 2018-02-23 RX ADMIN — Medication 300 UNITS: at 12:36

## 2018-02-23 RX ADMIN — SODIUM CHLORIDE 20 ML/HR: 0.9 INJECTION, SOLUTION INTRAVENOUS at 08:25

## 2018-02-23 RX ADMIN — ACETAMINOPHEN 650 MG: 325 TABLET, FILM COATED ORAL at 08:32

## 2018-02-23 RX ADMIN — HYDROCORTISONE SODIUM SUCCINATE 100 MG: 100 INJECTION, POWDER, FOR SOLUTION INTRAMUSCULAR; INTRAVENOUS at 08:32

## 2018-02-23 RX ADMIN — DIPHENHYDRAMINE HYDROCHLORIDE 25 MG: 50 INJECTION, SOLUTION INTRAMUSCULAR; INTRAVENOUS at 08:37

## 2018-02-23 NOTE — PLAN OF CARE
Problem: Potential for Falls  Goal: Patient will remain free of falls  INTERVENTIONS:  - Assess patient frequently for physical needs  -  Identify cognitive and physical deficits and behaviors that affect risk of falls    -  [place] fall precautions as indicated by assessment   - Educate patient/family on patient safety including physical limitations  - Instruct patient to call for assistance with activity based on assessment  - Modify environment to reduce risk of injury  - Consider OT/PT consult to assist with strengthening/mobility   Outcome: Progressing

## 2018-04-11 DIAGNOSIS — C82.90 FOLLICULAR LYMPHOMA, UNSPECIFIED FOLLICULAR LYMPHOMA TYPE, UNSPECIFIED BODY REGION (HCC): Primary | ICD-10-CM

## 2018-04-16 RX ORDER — SODIUM CHLORIDE 9 MG/ML
20 INJECTION, SOLUTION INTRAVENOUS ONCE
Status: COMPLETED | OUTPATIENT
Start: 2018-04-17 | End: 2018-04-17

## 2018-04-16 RX ORDER — ACETAMINOPHEN 325 MG/1
650 TABLET ORAL ONCE
Status: COMPLETED | OUTPATIENT
Start: 2018-04-17 | End: 2018-04-17

## 2018-04-17 ENCOUNTER — HOSPITAL ENCOUNTER (OUTPATIENT)
Dept: INFUSION CENTER | Facility: HOSPITAL | Age: 48
Discharge: HOME | End: 2018-04-17
Payer: COMMERCIAL

## 2018-04-17 VITALS
SYSTOLIC BLOOD PRESSURE: 128 MMHG | DIASTOLIC BLOOD PRESSURE: 76 MMHG | BODY MASS INDEX: 28.28 KG/M2 | WEIGHT: 213.41 LBS | TEMPERATURE: 97 F | HEART RATE: 68 BPM | HEIGHT: 73 IN | RESPIRATION RATE: 16 BRPM

## 2018-04-17 PROCEDURE — 96413 CHEMO IV INFUSION 1 HR: CPT

## 2018-04-17 PROCEDURE — 96375 TX/PRO/DX INJ NEW DRUG ADDON: CPT

## 2018-04-17 PROCEDURE — 96367 TX/PROPH/DG ADDL SEQ IV INF: CPT

## 2018-04-17 PROCEDURE — 96415 CHEMO IV INFUSION ADDL HR: CPT

## 2018-04-17 RX ADMIN — SODIUM CHLORIDE 20 ML/HR: 0.9 INJECTION, SOLUTION INTRAVENOUS at 08:25

## 2018-04-17 RX ADMIN — Medication 300 UNITS: at 12:00

## 2018-04-17 RX ADMIN — RITUXIMAB 800 MG: 10 INJECTION, SOLUTION INTRAVENOUS at 08:57

## 2018-04-17 RX ADMIN — DIPHENHYDRAMINE HYDROCHLORIDE 25 MG: 50 INJECTION, SOLUTION INTRAMUSCULAR; INTRAVENOUS at 08:25

## 2018-04-17 RX ADMIN — ACETAMINOPHEN 650 MG: 325 TABLET, FILM COATED ORAL at 08:25

## 2018-04-17 RX ADMIN — HYDROCORTISONE SODIUM SUCCINATE 100 MG: 100 INJECTION, POWDER, FOR SOLUTION INTRAMUSCULAR; INTRAVENOUS at 08:26

## 2018-04-17 NOTE — PLAN OF CARE
Problem: Potential for Falls  Goal: Patient will remain free of falls  INTERVENTIONS:  - Assess patient frequently for physical needs  -  Identify cognitive and physical deficits and behaviors that affect risk of falls    -  [place] fall precautions as indicated by assessment   - Educate patient/family on patient safety including physical limitations  - Instruct patient to call for assistance with activity based on assessment  - Modify environment to reduce risk of injury  - Consider OT/PT consult to assist with strengthening/mobility   Outcome: Progressing

## 2018-05-03 DIAGNOSIS — C82.90 FOLLICULAR LYMPHOMA, UNSPECIFIED FOLLICULAR LYMPHOMA TYPE, UNSPECIFIED BODY REGION (HCC): Primary | ICD-10-CM

## 2018-05-07 DIAGNOSIS — C82.90 FOLLICULAR LYMPHOMA, UNSPECIFIED FOLLICULAR LYMPHOMA TYPE, UNSPECIFIED BODY REGION (HCC): Primary | ICD-10-CM

## 2018-05-08 ENCOUNTER — HOSPITAL ENCOUNTER (OUTPATIENT)
Dept: RADIOLOGY | Age: 48
Discharge: HOME | End: 2018-05-08
Attending: INTERNAL MEDICINE
Payer: COMMERCIAL

## 2018-05-08 DIAGNOSIS — C82.90 FOLLICULAR LYMPHOMA, UNSPECIFIED FOLLICULAR LYMPHOMA TYPE, UNSPECIFIED BODY REGION (HCC): ICD-10-CM

## 2018-05-08 PROCEDURE — 71260 CT THORAX DX C+: CPT

## 2018-05-08 PROCEDURE — 74177 CT ABD & PELVIS W/CONTRAST: CPT

## 2018-05-08 RX ADMIN — IOHEXOL 100 ML: 350 INJECTION, SOLUTION INTRAVENOUS at 08:24

## 2018-05-10 ENCOUNTER — TELEPHONE (OUTPATIENT)
Dept: HEMATOLOGY ONCOLOGY | Facility: CLINIC | Age: 48
End: 2018-05-10

## 2018-05-10 NOTE — TELEPHONE ENCOUNTER
Calling to see if results of CT are available  Informed that there is no final reading yet  It can take 3-5 days  He will call back

## 2018-05-29 ENCOUNTER — HOSPITAL ENCOUNTER (OUTPATIENT)
Dept: INFUSION CENTER | Facility: CLINIC | Age: 48
Discharge: HOME | End: 2018-05-29
Payer: COMMERCIAL

## 2018-05-29 DIAGNOSIS — C82.48 FOLLICULAR LYMPHOMA GRADE IIIB OF LYMPH NODES OF MULTIPLE SITES (HCC): ICD-10-CM

## 2018-05-29 LAB
ALBUMIN SERPL BCP-MCNC: 3.8 G/DL (ref 3.5–5)
ALP SERPL-CCNC: 83 U/L (ref 46–116)
ALT SERPL W P-5'-P-CCNC: 42 U/L (ref 12–78)
ANION GAP SERPL CALCULATED.3IONS-SCNC: 5 MMOL/L (ref 4–13)
AST SERPL W P-5'-P-CCNC: 22 U/L (ref 5–45)
BASOPHILS # BLD AUTO: 0.03 THOUSANDS/ΜL (ref 0–0.1)
BASOPHILS NFR BLD AUTO: 1 % (ref 0–1)
BILIRUB SERPL-MCNC: 0.6 MG/DL (ref 0.2–1)
BUN SERPL-MCNC: 12 MG/DL (ref 5–25)
CALCIUM SERPL-MCNC: 8.9 MG/DL (ref 8.3–10.1)
CHLORIDE SERPL-SCNC: 108 MMOL/L (ref 100–108)
CO2 SERPL-SCNC: 30 MMOL/L (ref 21–32)
CREAT SERPL-MCNC: 0.72 MG/DL (ref 0.6–1.3)
EOSINOPHIL # BLD AUTO: 0.18 THOUSAND/ΜL (ref 0–0.61)
EOSINOPHIL NFR BLD AUTO: 3 % (ref 0–6)
ERYTHROCYTE [DISTWIDTH] IN BLOOD BY AUTOMATED COUNT: 13.3 % (ref 11.6–15.1)
GFR SERPL CREATININE-BSD FRML MDRD: 110 ML/MIN/1.73SQ M
GLUCOSE SERPL-MCNC: 101 MG/DL (ref 65–140)
HCT VFR BLD AUTO: 41.3 % (ref 36.5–49.3)
HGB BLD-MCNC: 14.5 G/DL (ref 12–17)
LDH SERPL-CCNC: 150 U/L (ref 81–234)
LYMPHOCYTES # BLD AUTO: 1.35 THOUSANDS/ΜL (ref 0.6–4.47)
LYMPHOCYTES NFR BLD AUTO: 23 % (ref 14–44)
MCH RBC QN AUTO: 29.2 PG (ref 26.8–34.3)
MCHC RBC AUTO-ENTMCNC: 35.1 G/DL (ref 31.4–37.4)
MCV RBC AUTO: 83 FL (ref 82–98)
MONOCYTES # BLD AUTO: 0.44 THOUSAND/ΜL (ref 0.17–1.22)
MONOCYTES NFR BLD AUTO: 8 % (ref 4–12)
NEUTROPHILS # BLD AUTO: 3.79 THOUSANDS/ΜL (ref 1.85–7.62)
NEUTS SEG NFR BLD AUTO: 66 % (ref 43–75)
PLATELET # BLD AUTO: 149 THOUSANDS/UL (ref 149–390)
PMV BLD AUTO: 11.1 FL (ref 8.9–12.7)
POTASSIUM SERPL-SCNC: 4 MMOL/L (ref 3.5–5.3)
PROT SERPL-MCNC: 6.6 G/DL (ref 6.4–8.2)
RBC # BLD AUTO: 4.96 MILLION/UL (ref 3.88–5.62)
SODIUM SERPL-SCNC: 143 MMOL/L (ref 136–145)
URATE SERPL-MCNC: 5.7 MG/DL (ref 4.2–8)
WBC # BLD AUTO: 5.79 THOUSAND/UL (ref 4.31–10.16)

## 2018-05-29 PROCEDURE — 83615 LACTATE (LD) (LDH) ENZYME: CPT

## 2018-05-29 PROCEDURE — 80053 COMPREHEN METABOLIC PANEL: CPT

## 2018-05-29 PROCEDURE — 84550 ASSAY OF BLOOD/URIC ACID: CPT

## 2018-05-29 PROCEDURE — 85025 COMPLETE CBC W/AUTO DIFF WBC: CPT

## 2018-05-29 RX ADMIN — Medication 300 UNITS: at 08:20

## 2018-05-29 NOTE — PLAN OF CARE
Problem: Potential for Falls  Goal: Patient will remain free of falls  INTERVENTIONS:  - Assess patient frequently for physical needs  -  Identify cognitive and physical deficits and behaviors that affect risk of falls    -  [place] fall precautions as indicated by assessment   - Educate patient/family on patient safety including physical limitations  - Instruct patient to call for assistance with activity based on assessment  - Modify environment to reduce risk of injury  - Consider OT/PT consult to assist with strengthening/mobility   Outcome: Progressing

## 2018-06-05 ENCOUNTER — OFFICE VISIT (OUTPATIENT)
Dept: HEMATOLOGY ONCOLOGY | Facility: CLINIC | Age: 48
End: 2018-06-05
Payer: COMMERCIAL

## 2018-06-05 VITALS
TEMPERATURE: 96.9 F | HEIGHT: 73 IN | SYSTOLIC BLOOD PRESSURE: 118 MMHG | BODY MASS INDEX: 27.96 KG/M2 | DIASTOLIC BLOOD PRESSURE: 80 MMHG | RESPIRATION RATE: 16 BRPM | HEART RATE: 72 BPM | OXYGEN SATURATION: 98 % | WEIGHT: 211 LBS

## 2018-06-05 DIAGNOSIS — C82.38 GRADE 3A FOLLICULAR LYMPHOMA OF LYMPH NODES OF MULTIPLE REGIONS (HCC): Primary | ICD-10-CM

## 2018-06-05 PROCEDURE — 99214 OFFICE O/P EST MOD 30 MIN: CPT | Performed by: INTERNAL MEDICINE

## 2018-06-05 NOTE — PROGRESS NOTES
Hematology Outpatient Follow - Up Note  Harry Moses 51 y o  male MRN: @ Encounter: [id number]        Date:  6/5/2018        Assessment/ Plan:   44-year-old  male with stage IV follicular lymphoma grade 3B when he presented with splenomegaly, weight loss, multiple lymphadenopathy, bone marrow biopsy showed 10% involvement with follicular lymphoma excision biopsy of the retroperitoneal lymph node confirmed the diagnosis again with a 2nd opinion at [address] [place] [address] positive for CD 20, CD 10 and negative for CD 23  He was treated in May 2017 with R-CHOP for a total of 6 cycles subsequent CT/PET scan showed no evidence of disease, currently on maintenance rituximab every 2 months initiated in November 2017 for total of 2 years, proceed  Follow-up in 4 month with CBC, CMP, LDH   I will order IgG level to rule out hypogammaglobulinemia  Flush the Port-A-Cath with every rituximab              HPI:51year-old  male with history of abdominal pain, weight loss prior to the presentation in 2016, he was found to have right submandibular lymphadenopathy, needle biopsy was not conclusive, subsequently CT scan showed bilateral axillary lymphadenopathy in May 2017 also splenomegaly, extensive retroperitoneal lymphadenopathy, adenopathy in the esophageal gastric junction, bilateral shotty lymphadenopathy in the both axilla, he had normal CBC except for thrombocytopenia with platelet count of [id number] normal differential, normal IgM, IgG, IgA, LDH was 166, kappa light chain of 78, lambda light chain of 17, he had retroperitoneal lymph node biopsy showed B-cell lymphoma, positive for CD 20, CD 10, negative for CD 23, the biopsy sent for 2nd opinion at [facility] [place], showed grade 3 B follicular lymphoma  A bone marrow biopsy showed 10% involvement with lymphoma  He was treated with R-CHOP from June 17 until October 2017 for total of 6 cycles, with PET scan after cycle 3   Showed significant response, repeat PET scan after 6 cycles of chemotherapy showed no evidence of disease  Currently on maintenance rituximab every 2 months initiated in November 2017 for total of 2 years      Interval History: No changes from the last visit       Previous Treatment:         Test Results:    Imaging: Ct Chest Abdomen Pelvis W Contrast    Result Date: 5/10/2018  Narrative: CT CHEST, ABDOMEN AND PELVIS WITH IV CONTRAST INDICATION:   K03 [id number]: Follicular lymphoma, unspecified, unspecified site  Reassessment of response following chemotherapy  COMPARISON: Prior PET scan 10/27/2017, outside institution CT of abdomen dated 5/5/2017 TECHNIQUE: CT examination of the chest, abdomen and pelvis was performed  Axial, sagittal, and coronal 2D reformatted images were created from the source data and submitted for interpretation  Radiation dose length product (DLP) for this visit:  771 mGy-cm   This examination, like all CT scans performed in the [facility], was performed utilizing techniques to minimize radiation dose exposure, including the use of iterative reconstruction and automated exposure control  IV Contrast:  100 mL of iohexol (OMNIPAQUE) Enteric Contrast: Enteric contrast was administered  FINDINGS: CHEST LUNGS:  Right lower lobe pulmonary nodule on image 3/42 measures approximately 4 mm in size and has not enlarged since 5/5/2017  No new nodules  No acute pulmonary disease  PLEURA:  Unremarkable  HEART/GREAT VESSELS:  Unremarkable for patient's age  MEDIASTINUM AND IBRAHIMA:  There is no evidence of progressive mediastinal or hilar adenopathy  No lymph nodes are seen measuring greater than 1 cm in short axis diameter  Posterior mediastinal lymph node along the right margin of the aorta on image 2/35 measures 5 mm  On 5/5/2017 the short axis diameter was 6-7 mm CHEST WALL AND LOWER NECK:   Unremarkable  ABDOMEN LIVER/BILIARY TREE:  Unremarkable  GALLBLADDER:  No calcified gallstones   No pericholecystic inflammatory change  SPLEEN:  The spleen remains mildly enlarged, 16 cm in cephalocaudad length, earlier 17 cm  Overall slightly diminished size since previous examination  Density is heterogeneous although without a discrete nodular lesion detected  [place] PANCREAS:  Unremarkable  ADRENAL GLANDS:  Unremarkable  KIDNEYS/URETERS:  Unremarkable  No hydronephrosis  STOMACH AND BOWEL:  Unremarkable  APPENDIX:  No findings to suggest appendicitis  ABDOMINOPELVIC CAVITY:  Continued improvement of retroperitoneal adenopathy  As an example, periportal node on image 2/65 measures 0 9 x 0 8 cm earlier 1 4 x 1 0 cm  Prior examination described a left retroperitoneal node measuring 1 2 x 0 7 cm currently 0 9 x 0 6 cm  No areas of disease progression are seen VESSELS:  Unremarkable for patient's age  PELVIS REPRODUCTIVE ORGANS:  Unremarkable for patient's age  URINARY BLADDER:  Unremarkable  ABDOMINAL WALL/INGUINAL REGIONS:  Unremarkable  OSSEOUS STRUCTURES:  A lucent defect in the right proximal femur with sclerotic margins is unchanged in appearance from the prior PET scan, with the earliest available CT dated 5/5/2017  There are no acute osseous findings     Impression: 1  Stable appearance of a 4 mm right lower lobe pulmonary nodule  No acute pulmonary disease 2  Continued improvement of previous mediastinal adenopathy with no areas of disease progression 3  Similarly, continued improvement of previously seen intra-abdominal adenopathy with no areas of disease progression  Currently, there are no lymph nodes identified with a short axis diameter greater than 1 cm  4   Unchanged appearance of lucent defect in the right proximal femur with surrounding sclerotic margins 5    Continued evidence of splenomegaly, although slightly diminished in size since the prior Workstation performed: [id number]       Labs:   Lab Results   Component Value Date    WBC 5 79 05/29/2018    HGB 14 5 05/29/2018    HCT 41 3 05/29/2018    MCV 83 05/29/2018     05/29/2018     Lab Results   Component Value Date     05/29/2018    K 4 0 05/29/2018     05/29/2018    CO2 30 05/29/2018    ANIONGAP 5 05/29/2018    BUN 12 05/29/2018    CREATININE 0 72 05/29/2018    GLUCOSE 101 05/29/2018    GLUF 106 (H) 09/29/2017    CALCIUM 8 9 05/29/2018    AST 22 05/29/2018    ALT 42 05/29/2018    ALKPHOS 83 05/29/2018    PROT 6 6 05/29/2018    BILITOT 0 60 05/29/2018    EGFR 110 05/29/2018       No results found for: IRON, TIBC, FERRITIN    No results found for: [id number]      ROS:   Review of Systems   Constitutional: Negative for activity change, appetite change, chills, diaphoresis, fatigue, fever and unexpected weight change  HENT: Negative for congestion, dental problem, facial swelling, hearing loss, mouth sores, nosebleeds, postnasal drip, rhinorrhea, sore throat, trouble swallowing and voice change  Eyes: Negative for photophobia, pain, discharge, redness, itching and visual disturbance  Respiratory: Negative for cough, choking, chest tightness, shortness of breath and wheezing  Cardiovascular: Negative for chest pain, palpitations and leg swelling  Gastrointestinal: Positive for abdominal pain ( intermittent left upper quadrant abdominal pain)  Negative for abdominal distention, anal bleeding, blood in stool, constipation, diarrhea, nausea, rectal pain and vomiting  Endocrine: Negative for cold intolerance and heat intolerance  Genitourinary: Negative for decreased urine volume, difficulty urinating, dysuria, flank pain, frequency, hematuria and urgency  Musculoskeletal: Negative for arthralgias, back pain, gait problem, joint swelling, myalgias, neck pain and neck stiffness  Skin: Negative for color change, pallor, rash and wound  Allergic/Immunologic: Negative for immunocompromised state     Neurological: Negative for dizziness, tremors, seizures, syncope, facial asymmetry, speech difficulty, weakness, light-headedness, numbness and headaches  Hematological: Negative for adenopathy  Does not bruise/bleed easily  Psychiatric/Behavioral: Negative for agitation, confusion, decreased concentration, dysphoric mood and sleep disturbance  The patient is not nervous/anxious  All other systems reviewed and are negative  Current Medications: Reviewed  Allergies: Reviewed  PMH/FH/SH:  Reviewed      Physical Exam:    Body surface area is 2 2 meters squared  Wt Readings from Last 3 Encounters:   06/05/18 95 7 kg (211 lb)   04/17/18 96 8 kg (213 lb 6 5 oz)   02/23/18 93 9 kg (207 lb)        Temp Readings from Last 3 Encounters:   06/05/18 (!) 96 9 °F (36 1 °C) (Tympanic)   04/17/18 (!) 97 °F (36 1 °C)   02/23/18 98 1 °F (36 7 °C) (Oral)        BP Readings from Last 3 Encounters:   06/05/18 118/80   04/17/18 128/76   02/23/18 118/80         Pulse Readings from Last 3 Encounters:   06/05/18 72   04/17/18 68   02/23/18 70        Physical Exam   Constitutional: He is oriented to person, place, and time  He appears well-developed and well-nourished  No distress  HENT:   Head: Normocephalic and atraumatic  Mouth/Throat: Oropharynx is clear and moist  No oropharyngeal exudate  Eyes: Conjunctivae and EOM are normal  Pupils are equal, round, and reactive to light  Neck: Normal range of motion  Neck supple  No tracheal deviation present  No thyromegaly present  Cardiovascular: Normal rate and regular rhythm  Exam reveals no gallop and no friction rub  No murmur heard  Pulmonary/Chest: Effort normal and breath sounds normal  No respiratory distress  He has no wheezes  He has no rales  He exhibits no tenderness  Abdominal: Soft  Bowel sounds are normal  He exhibits no distension and no mass  There is no tenderness  There is no rebound and no guarding  Musculoskeletal: Normal range of motion  He exhibits no edema  Lymphadenopathy:     He has no cervical adenopathy     Neurological: He is alert and oriented to person, place, and time  Skin: Skin is warm and dry  No rash noted  He is not diaphoretic  No erythema  No pallor  Psychiatric: He has a normal mood and affect  His behavior is normal  Judgment and thought content normal    Vitals reviewed  Goals and Barriers:  Current Goal: Minimize effects of disease  Barriers: None  Patient's Capacity to Self Care:  Patient is able to self care      Code Status: [facility]

## 2018-06-05 NOTE — LETTER
2018     Klaudia Bean  [address] [place] [address]    Patient: Amrita Bazzi   YOB: 1970   Date of Visit: 2018       Dear Dr Escobar Forgan:    Thank you for referring Amrita Bazzi to me for evaluation  Below are my notes for this consultation  If you have questions, please do not hesitate to call me  I look forward to following your patient along with you           Sincerely,        Geoff Maldonado MD        CC: No Recipients  Geoff Maldonado MD  2018  8:00 AM  Sign at close encounter  Hematology Outpatient Follow - Up Note  Amrita Bazzi 51 y o  male MRN: @ Encounter: [id number]        Date:  2018        Assessment/ Plan:   40-year-old  male with stage IV follicular lymphoma grade 3B when he presented with splenomegaly, weight loss, multiple lymphadenopathy, bone marrow biopsy showed 10% involvement with follicular lymphoma excision biopsy of the retroperitoneal lymph node confirmed the diagnosis again with a 2nd opinion at [facility] positive for CD 20, CD 10 and negative for CD 23  He was treated in May 2017 with R-CHOP for a total of 6 cycles subsequent CT/PET scan showed no evidence of disease, currently on maintenance rituximab every 2 months initiated in 2017 for total of 2 years, proceed  Follow-up in 4 month with CBC, CMP, LDH   I will order IgG level to rule out hypogammaglobulinemia  Flush the Port-A-Cath with every rituximab              HPI:51year-old  male with history of abdominal pain, weight loss prior to the presentation in , he was found to have right submandibular lymphadenopathy, needle biopsy was not conclusive, subsequently CT scan showed bilateral axillary lymphadenopathy in May 2017 also splenomegaly, extensive retroperitoneal lymphadenopathy, adenopathy in the esophageal gastric junction, bilateral shotty lymphadenopathy in the both axilla, he had normal CBC except for thrombocytopenia with platelet count of [id number] normal differential, normal IgM, IgG, IgA, LDH was 166, kappa light chain of 78, lambda light chain of 17, he had retroperitoneal lymph node biopsy showed B-cell lymphoma, positive for CD 20, CD 10, negative for CD 23, the biopsy sent for 2nd opinion at [address] [place] [address], showed grade 3 B follicular lymphoma  A bone marrow biopsy showed 10% involvement with lymphoma  He was treated with R-CHOP from June 17 until October 2017 for total of 6 cycles, with PET scan after cycle 3  Showed significant response, repeat PET scan after 6 cycles of chemotherapy showed no evidence of disease  Currently on maintenance rituximab every 2 months initiated in November 2017 for total of 2 years      Interval History: No changes from the last visit       Previous Treatment:         Test Results:    Imaging: Ct Chest Abdomen Pelvis W Contrast    Result Date: 5/10/2018  Narrative: CT CHEST, ABDOMEN AND PELVIS WITH IV CONTRAST INDICATION:   [id number]: Follicular lymphoma, unspecified, unspecified site  Reassessment of response following chemotherapy  COMPARISON: Prior PET scan 10/27/2017, outside institution CT of abdomen dated 5/5/2017 TECHNIQUE: CT examination of the chest, abdomen and pelvis was performed  Axial, sagittal, and coronal 2D reformatted images were created from the source data and submitted for interpretation  Radiation dose length product (DLP) for this visit:  771 mGy-cm   This examination, like all CT scans performed in the [facility], was performed utilizing techniques to minimize radiation dose exposure, including the use of iterative reconstruction and automated exposure control  IV Contrast:  100 mL of iohexol (OMNIPAQUE) Enteric Contrast: Enteric contrast was administered  FINDINGS: CHEST LUNGS:  Right lower lobe pulmonary nodule on image 3/42 measures approximately 4 mm in size and has not enlarged since 5/5/2017  No new nodules  No acute pulmonary disease  PLEURA:  Unremarkable  HEART/GREAT VESSELS:  Unremarkable for patient's age  MEDIASTINUM AND IBRAHIMA:  There is no evidence of progressive mediastinal or hilar adenopathy  No lymph nodes are seen measuring greater than 1 cm in short axis diameter  Posterior mediastinal lymph node along the right margin of the aorta on image 2/35 measures 5 mm  On 5/5/2017 the short axis diameter was 6-7 mm CHEST WALL AND LOWER NECK:   Unremarkable  ABDOMEN LIVER/BILIARY TREE:  Unremarkable  GALLBLADDER:  No calcified gallstones  No pericholecystic inflammatory change  SPLEEN:  The spleen remains mildly enlarged, 16 cm in cephalocaudad length, earlier 17 cm  Overall slightly diminished size since previous examination  Density is heterogeneous although without a discrete nodular lesion detected  [place] PANCREAS:  Unremarkable  ADRENAL GLANDS:  Unremarkable  KIDNEYS/URETERS:  Unremarkable  No hydronephrosis  STOMACH AND BOWEL:  Unremarkable  APPENDIX:  No findings to suggest appendicitis  ABDOMINOPELVIC CAVITY:  Continued improvement of retroperitoneal adenopathy  As an example, periportal node on image 2/65 measures 0 9 x 0 8 cm earlier 1 4 x 1 0 cm  Prior examination described a left retroperitoneal node measuring 1 2 x 0 7 cm currently 0 9 x 0 6 cm  No areas of disease progression are seen VESSELS:  Unremarkable for patient's age  PELVIS REPRODUCTIVE ORGANS:  Unremarkable for patient's age  URINARY BLADDER:  Unremarkable  ABDOMINAL WALL/INGUINAL REGIONS:  Unremarkable  OSSEOUS STRUCTURES:  A lucent defect in the right proximal femur with sclerotic margins is unchanged in appearance from the prior PET scan, with the earliest available CT dated 5/5/2017  There are no acute osseous findings     Impression: 1  Stable appearance of a 4 mm right lower lobe pulmonary nodule  No acute pulmonary disease 2  Continued improvement of previous mediastinal adenopathy with no areas of disease progression 3   Similarly, continued improvement of previously seen intra-abdominal adenopathy with no areas of disease progression  Currently, there are no lymph nodes identified with a short axis diameter greater than 1 cm  4   Unchanged appearance of lucent defect in the right proximal femur with surrounding sclerotic margins 5  Continued evidence of splenomegaly, although slightly diminished in size since the prior Workstation performed: [id number]       Labs:   Lab Results   Component Value Date    WBC 5 79 05/29/2018    HGB 14 5 05/29/2018    HCT 41 3 05/29/2018    MCV 83 05/29/2018     05/29/2018     Lab Results   Component Value Date     05/29/2018    K 4 0 05/29/2018     05/29/2018    CO2 30 05/29/2018    ANIONGAP 5 05/29/2018    BUN 12 05/29/2018    CREATININE 0 72 05/29/2018    GLUCOSE 101 05/29/2018    GLUF 106 (H) 09/29/2017    CALCIUM 8 9 05/29/2018    AST 22 05/29/2018    ALT 42 05/29/2018    ALKPHOS 83 05/29/2018    PROT 6 6 05/29/2018    BILITOT 0 60 05/29/2018    EGFR 110 05/29/2018       No results found for: IRON, TIBC, FERRITIN    No results found for: [id number]      ROS:   Review of Systems   Constitutional: Negative for activity change, appetite change, chills, diaphoresis, fatigue, fever and unexpected weight change  HENT: Negative for congestion, dental problem, facial swelling, hearing loss, mouth sores, nosebleeds, postnasal drip, rhinorrhea, sore throat, trouble swallowing and voice change  Eyes: Negative for photophobia, pain, discharge, redness, itching and visual disturbance  Respiratory: Negative for cough, choking, chest tightness, shortness of breath and wheezing  Cardiovascular: Negative for chest pain, palpitations and leg swelling  Gastrointestinal: Positive for abdominal pain ( intermittent left upper quadrant abdominal pain)  Negative for abdominal distention, anal bleeding, blood in stool, constipation, diarrhea, nausea, rectal pain and vomiting     Endocrine: Negative for cold intolerance and heat intolerance  Genitourinary: Negative for decreased urine volume, difficulty urinating, dysuria, flank pain, frequency, hematuria and urgency  Musculoskeletal: Negative for arthralgias, back pain, gait problem, joint swelling, myalgias, neck pain and neck stiffness  Skin: Negative for color change, pallor, rash and wound  Allergic/Immunologic: Negative for immunocompromised state  Neurological: Negative for dizziness, tremors, seizures, syncope, facial asymmetry, speech difficulty, weakness, light-headedness, numbness and headaches  Hematological: Negative for adenopathy  Does not bruise/bleed easily  Psychiatric/Behavioral: Negative for agitation, confusion, decreased concentration, dysphoric mood and sleep disturbance  The patient is not nervous/anxious  All other systems reviewed and are negative  Current Medications: Reviewed  Allergies: Reviewed  PMH/FH/SH:  Reviewed      Physical Exam:    Body surface area is 2 2 meters squared  Wt Readings from Last 3 Encounters:   06/05/18 95 7 kg (211 lb)   04/17/18 96 8 kg (213 lb 6 5 oz)   02/23/18 93 9 kg (207 lb)        Temp Readings from Last 3 Encounters:   06/05/18 (!) 96 9 °F (36 1 °C) (Tympanic)   04/17/18 (!) 97 °F (36 1 °C)   02/23/18 98 1 °F (36 7 °C) (Oral)        BP Readings from Last 3 Encounters:   06/05/18 118/80   04/17/18 128/76   02/23/18 118/80         Pulse Readings from Last 3 Encounters:   06/05/18 72   04/17/18 68   02/23/18 70        Physical Exam   Constitutional: He is oriented to person, place, and time  He appears well-developed and well-nourished  No distress  HENT:   Head: Normocephalic and atraumatic  Mouth/Throat: Oropharynx is clear and moist  No oropharyngeal exudate  Eyes: Conjunctivae and EOM are normal  Pupils are equal, round, and reactive to light  Neck: Normal range of motion  Neck supple  No tracheal deviation present  No thyromegaly present  Cardiovascular: Normal rate and regular rhythm  Exam reveals no gallop and no friction rub  No murmur heard  Pulmonary/Chest: Effort normal and breath sounds normal  No respiratory distress  He has no wheezes  He has no rales  He exhibits no tenderness  Abdominal: Soft  Bowel sounds are normal  He exhibits no distension and no mass  There is no tenderness  There is no rebound and no guarding  Musculoskeletal: Normal range of motion  He exhibits no edema  Lymphadenopathy:     He has no cervical adenopathy  Neurological: He is alert and oriented to person, place, and time  Skin: Skin is warm and dry  No rash noted  He is not diaphoretic  No erythema  No pallor  Psychiatric: He has a normal mood and affect  His behavior is normal  Judgment and thought content normal    Vitals reviewed  Goals and Barriers:  Current Goal: Minimize effects of disease  Barriers: None  Patient's Capacity to Self Care:  Patient is able to self care      Code Status: [unfilled]

## 2018-06-08 ENCOUNTER — TELEPHONE (OUTPATIENT)
Dept: HEMATOLOGY ONCOLOGY | Facility: CLINIC | Age: 48
End: 2018-06-08

## 2018-06-08 NOTE — TELEPHONE ENCOUNTER
Pt called asking if can move Rituxan to next Friday but no openings on Friday  Will keep as scheduled

## 2018-06-14 RX ORDER — SODIUM CHLORIDE 9 MG/ML
20 INJECTION, SOLUTION INTRAVENOUS CONTINUOUS
Status: DISCONTINUED | OUTPATIENT
Start: 2018-06-15 | End: 2018-06-18 | Stop reason: HOSPADM

## 2018-06-14 RX ORDER — ACETAMINOPHEN 325 MG/1
650 TABLET ORAL ONCE
Status: COMPLETED | OUTPATIENT
Start: 2018-06-15 | End: 2018-06-15

## 2018-06-15 ENCOUNTER — HOSPITAL ENCOUNTER (OUTPATIENT)
Dept: INFUSION CENTER | Facility: CLINIC | Age: 48
Discharge: HOME | End: 2018-06-15
Payer: COMMERCIAL

## 2018-06-15 VITALS
HEART RATE: 67 BPM | DIASTOLIC BLOOD PRESSURE: 75 MMHG | TEMPERATURE: 96.7 F | WEIGHT: 208.34 LBS | HEIGHT: 73 IN | RESPIRATION RATE: 16 BRPM | SYSTOLIC BLOOD PRESSURE: 114 MMHG | BODY MASS INDEX: 27.61 KG/M2

## 2018-06-15 PROCEDURE — 96413 CHEMO IV INFUSION 1 HR: CPT

## 2018-06-15 PROCEDURE — 96375 TX/PRO/DX INJ NEW DRUG ADDON: CPT

## 2018-06-15 PROCEDURE — 96415 CHEMO IV INFUSION ADDL HR: CPT

## 2018-06-15 RX ADMIN — SODIUM CHLORIDE 20 ML/HR: 0.9 INJECTION, SOLUTION INTRAVENOUS at 08:20

## 2018-06-15 RX ADMIN — RITUXIMAB 800 MG: 10 INJECTION, SOLUTION INTRAVENOUS at 09:01

## 2018-06-15 RX ADMIN — DIPHENHYDRAMINE HYDROCHLORIDE 25 MG: 50 INJECTION, SOLUTION INTRAMUSCULAR; INTRAVENOUS at 08:40

## 2018-06-15 RX ADMIN — ACETAMINOPHEN 650 MG: 325 TABLET, FILM COATED ORAL at 08:20

## 2018-06-15 RX ADMIN — Medication 300 UNITS: at 12:07

## 2018-06-15 RX ADMIN — HYDROCORTISONE SODIUM SUCCINATE 100 MG: 100 INJECTION, POWDER, FOR SOLUTION INTRAMUSCULAR; INTRAVENOUS at 08:31

## 2018-06-15 NOTE — PROGRESS NOTES
Pt  Denies new symptoms or concerns at this time  No labs ordered at this time  Rituxan ordered for infusion today

## 2018-06-15 NOTE — PLAN OF CARE
Problem: PAIN - ADULT  Goal: Verbalizes/displays adequate comfort level or baseline comfort level  Interventions:  - Encourage patient to monitor pain and request assistance  - Assess pain using appropriate pain scale  - Administer analgesics based on type and severity of pain and evaluate response  - Implement non-pharmacological measures as appropriate and evaluate response  - Consider cultural and social influences on pain and pain management  - Notify physician/advanced practitioner if interventions unsuccessful or patient reports new pain  Outcome: Progressing      Problem: INFECTION - ADULT  Goal: Absence or prevention of progression during hospitalization  INTERVENTIONS:  - Assess and monitor for signs and symptoms of infection  - Monitor lab/diagnostic results  - Monitor all insertion sites, i e  indwelling lines, tubes, and drains  - Monitor endotracheal (as able) and nasal secretions for changes in amount and color  - [place] appropriate cooling/warming therapies per order  - Administer medications as ordered  - Instruct and encourage patient and family to use good hand hygiene technique  - Identify and instruct in appropriate isolation precautions for identified infection/condition  Outcome: Progressing    Goal: Absence of fever/infection during neutropenic period  INTERVENTIONS:  - Monitor WBC  - Implement neutropenic guidelines  Outcome: Progressing      Problem: Knowledge Deficit  Goal: Patient/family/caregiver demonstrates understanding of disease process, treatment plan, medications, and discharge instructions  Complete learning assessment and assess knowledge base    Interventions:  - Provide teaching at level of understanding  - Provide teaching via preferred learning methods  Outcome: Progressing

## 2018-08-09 RX ORDER — SODIUM CHLORIDE 9 MG/ML
20 INJECTION, SOLUTION INTRAVENOUS CONTINUOUS
Status: DISCONTINUED | OUTPATIENT
Start: 2018-08-10 | End: 2018-08-13 | Stop reason: HOSPADM

## 2018-08-09 RX ORDER — ACETAMINOPHEN 325 MG/1
650 TABLET ORAL ONCE
Status: COMPLETED | OUTPATIENT
Start: 2018-08-10 | End: 2018-08-10

## 2018-08-10 ENCOUNTER — HOSPITAL ENCOUNTER (OUTPATIENT)
Dept: INFUSION CENTER | Facility: CLINIC | Age: 48
Discharge: HOME | End: 2018-08-10
Payer: COMMERCIAL

## 2018-08-10 VITALS
DIASTOLIC BLOOD PRESSURE: 70 MMHG | RESPIRATION RATE: 16 BRPM | SYSTOLIC BLOOD PRESSURE: 124 MMHG | TEMPERATURE: 98.1 F | HEART RATE: 60 BPM | BODY MASS INDEX: 27.3 KG/M2 | WEIGHT: 206 LBS | HEIGHT: 73 IN

## 2018-08-10 PROCEDURE — 96413 CHEMO IV INFUSION 1 HR: CPT

## 2018-08-10 PROCEDURE — 96415 CHEMO IV INFUSION ADDL HR: CPT

## 2018-08-10 PROCEDURE — 96375 TX/PRO/DX INJ NEW DRUG ADDON: CPT

## 2018-08-10 RX ADMIN — SODIUM CHLORIDE 20 ML/HR: 0.9 INJECTION, SOLUTION INTRAVENOUS at 08:15

## 2018-08-10 RX ADMIN — Medication 300 UNITS: at 11:56

## 2018-08-10 RX ADMIN — HYDROCORTISONE SODIUM SUCCINATE 100 MG: 100 INJECTION, POWDER, FOR SOLUTION INTRAMUSCULAR; INTRAVENOUS at 08:26

## 2018-08-10 RX ADMIN — DIPHENHYDRAMINE HYDROCHLORIDE 25 MG: 50 INJECTION, SOLUTION INTRAMUSCULAR; INTRAVENOUS at 08:26

## 2018-08-10 RX ADMIN — RITUXIMAB 800 MG: 10 INJECTION, SOLUTION INTRAVENOUS at 08:57

## 2018-08-10 RX ADMIN — ACETAMINOPHEN 650 MG: 325 TABLET, FILM COATED ORAL at 08:26

## 2018-10-01 ENCOUNTER — APPOINTMENT (OUTPATIENT)
Dept: LAB | Age: 48
End: 2018-10-01
Payer: COMMERCIAL

## 2018-10-01 ENCOUNTER — TELEPHONE (OUTPATIENT)
Dept: HEMATOLOGY ONCOLOGY | Facility: CLINIC | Age: 48
End: 2018-10-01

## 2018-10-01 ENCOUNTER — TRANSCRIBE ORDERS (OUTPATIENT)
Dept: ADMINISTRATIVE | Age: 48
End: 2018-10-01

## 2018-10-01 DIAGNOSIS — C82.38 GRADE 3A FOLLICULAR LYMPHOMA OF LYMPH NODES OF MULTIPLE REGIONS (HCC): ICD-10-CM

## 2018-10-01 LAB
ALBUMIN SERPL BCP-MCNC: 4 G/DL (ref 3.5–5)
ALP SERPL-CCNC: 97 U/L (ref 46–116)
ALT SERPL W P-5'-P-CCNC: 55 U/L (ref 12–78)
ANION GAP SERPL CALCULATED.3IONS-SCNC: 6 MMOL/L (ref 4–13)
AST SERPL W P-5'-P-CCNC: 29 U/L (ref 5–45)
BASOPHILS # BLD AUTO: 0.05 THOUSANDS/ΜL (ref 0–0.1)
BASOPHILS NFR BLD AUTO: 1 % (ref 0–1)
BILIRUB SERPL-MCNC: 0.77 MG/DL (ref 0.2–1)
BUN SERPL-MCNC: 12 MG/DL (ref 5–25)
CALCIUM SERPL-MCNC: 9 MG/DL (ref 8.3–10.1)
CHLORIDE SERPL-SCNC: 104 MMOL/L (ref 100–108)
CO2 SERPL-SCNC: 30 MMOL/L (ref 21–32)
CREAT SERPL-MCNC: 0.77 MG/DL (ref 0.6–1.3)
EOSINOPHIL # BLD AUTO: 0.19 THOUSAND/ΜL (ref 0–0.61)
EOSINOPHIL NFR BLD AUTO: 3 % (ref 0–6)
ERYTHROCYTE [DISTWIDTH] IN BLOOD BY AUTOMATED COUNT: 13.1 % (ref 11.6–15.1)
GFR SERPL CREATININE-BSD FRML MDRD: 107 ML/MIN/1.73SQ M
GLUCOSE SERPL-MCNC: 93 MG/DL (ref 65–140)
HCT VFR BLD AUTO: 45.9 % (ref 36.5–49.3)
HGB BLD-MCNC: 15.1 G/DL (ref 12–17)
IGA SERPL-MCNC: 118 MG/DL (ref 70–400)
IGG SERPL-MCNC: 906 MG/DL (ref 700–1600)
IGM SERPL-MCNC: <210 MG/DL (ref 40–230)
IMM GRANULOCYTES # BLD AUTO: 0.05 THOUSAND/UL (ref 0–0.2)
IMM GRANULOCYTES NFR BLD AUTO: 1 % (ref 0–2)
LDH SERPL-CCNC: 164 U/L (ref 81–234)
LYMPHOCYTES # BLD AUTO: 1.73 THOUSANDS/ΜL (ref 0.6–4.47)
LYMPHOCYTES NFR BLD AUTO: 25 % (ref 14–44)
MCH RBC QN AUTO: 28.9 PG (ref 26.8–34.3)
MCHC RBC AUTO-ENTMCNC: 32.9 G/DL (ref 31.4–37.4)
MCV RBC AUTO: 88 FL (ref 82–98)
MONOCYTES # BLD AUTO: 0.57 THOUSAND/ΜL (ref 0.17–1.22)
MONOCYTES NFR BLD AUTO: 8 % (ref 4–12)
NEUTROPHILS # BLD AUTO: 4.24 THOUSANDS/ΜL (ref 1.85–7.62)
NEUTS SEG NFR BLD AUTO: 62 % (ref 43–75)
NRBC BLD AUTO-RTO: 0 /100 WBCS
PLATELET # BLD AUTO: 163 THOUSANDS/UL (ref 149–390)
PMV BLD AUTO: 11.3 FL (ref 8.9–12.7)
POTASSIUM SERPL-SCNC: 4.5 MMOL/L (ref 3.5–5.3)
PROT SERPL-MCNC: 7.2 G/DL (ref 6.4–8.2)
RBC # BLD AUTO: 5.23 MILLION/UL (ref 3.88–5.62)
SODIUM SERPL-SCNC: 140 MMOL/L (ref 136–145)
WBC # BLD AUTO: 6.83 THOUSAND/UL (ref 4.31–10.16)

## 2018-10-01 PROCEDURE — 83615 LACTATE (LD) (LDH) ENZYME: CPT

## 2018-10-01 PROCEDURE — 80053 COMPREHEN METABOLIC PANEL: CPT

## 2018-10-01 PROCEDURE — 82784 ASSAY IGA/IGD/IGG/IGM EACH: CPT

## 2018-10-01 PROCEDURE — 85025 COMPLETE CBC W/AUTO DIFF WBC: CPT

## 2018-10-01 PROCEDURE — 36415 COLL VENOUS BLD VENIPUNCTURE: CPT

## 2018-10-01 NOTE — TELEPHONE ENCOUNTER
Pt was called as a courtesy to remind him of his appt tomorrow  Pt agreed to keep his appt and will get blood work done today

## 2018-10-02 ENCOUNTER — OFFICE VISIT (OUTPATIENT)
Dept: HEMATOLOGY ONCOLOGY | Facility: CLINIC | Age: 48
End: 2018-10-02
Payer: COMMERCIAL

## 2018-10-02 VITALS
DIASTOLIC BLOOD PRESSURE: 60 MMHG | OXYGEN SATURATION: 98 % | HEART RATE: 68 BPM | HEIGHT: 73 IN | WEIGHT: 206 LBS | SYSTOLIC BLOOD PRESSURE: 120 MMHG | RESPIRATION RATE: 16 BRPM | TEMPERATURE: 96.4 F | BODY MASS INDEX: 27.3 KG/M2

## 2018-10-02 DIAGNOSIS — C82.18 FOLLICULAR LYMPHOMA GRADE II OF LYMPH NODES OF MULTIPLE SITES (HCC): Primary | ICD-10-CM

## 2018-10-02 PROCEDURE — 99214 OFFICE O/P EST MOD 30 MIN: CPT | Performed by: INTERNAL MEDICINE

## 2018-10-02 NOTE — PROGRESS NOTES
Hematology Outpatient Follow - Up Note  Mainor House 51 y o  male MRN: @ Encounter: [id number]        Date:  10/2/2018        Assessment/ Plan:   79-year-old male with stage IV follicular lymphoma grade 3B when he presented with splenomegaly, weight loss, multiple lymphadenopathy above and below the diaphragm, bone marrow biopsy showed 10% involvement with follicular lymphoma excisional biopsy of the retroperitoneal lymph node confirm the diagnosis as well, he had a 2nd opinion at [address] [place] [address] and the decision to start the treatment with R-CHOP in May 2017 he received a total of 6 cycles subsequent CT/ PET scan showed no evidence of disease, and physical examination showed no evidence of lymphadenopathy or splenomegaly  Currently on maintenance rituximab every 2 months, proceed with dose 6  And dose 7   At 375 milligram/meter squared   Follow-up in 4 months with CBC, CMP, LDH   No evidence of hypogammaglobulinemia secondary to rituximab  The patient requested PET scan however at this time we decided just to proceed with the therapy because of normal CBC, CMP and LDH           HPI:  79-year-old  male with history of abdominal pain, weight loss prior to the presentation in 2016, he was found to have right submandibular lymphadenopathy, needle biopsy was not conclusive, subsequently CT scan showed bilateral axillary lymphadenopathy in May 2017 also splenomegaly, extensive retroperitoneal lymphadenopathy, adenopathy in the esophageal gastric junction, bilateral shotty lymphadenopathy in the both axilla, he had normal CBC except for thrombocytopenia with platelet count of [id number] normal differential, normal IgM, IgG, IgA, LDH was 166, kappa light chain of 78, lambda light chain of 17, he had retroperitoneal lymph node biopsy showed B-cell lymphoma, positive for CD 20, CD 10, negative for CD 23, the biopsy sent for 2nd opinion at [facility], showed grade 3 B follicular lymphoma  A bone marrow biopsy showed 10% involvement with lymphoma  He was treated with R-CHOP from June 17 until October 2017 for total of 6 cycles, with PET scan after cycle 3  Showed significant response, repeat PET scan after 6 cycles of chemotherapy showed no evidence of disease  Currently on maintenance rituximab every 2 months initiated in November 2017 for total of 2 years      Interval History:  No changes from the last visit      Previous Treatment:         Test Results:    Imaging: No results found  Labs:   Lab Results   Component Value Date    WBC 6 83 10/01/2018    HGB 15 1 10/01/2018    HCT 45 9 10/01/2018    MCV 88 10/01/2018     10/01/2018     Lab Results   Component Value Date     10/01/2018    K 4 5 10/01/2018     10/01/2018    CO2 30 10/01/2018    BUN 12 10/01/2018    CREATININE 0 77 10/01/2018    GLUF 106 (H) 09/29/2017    CALCIUM 9 0 10/01/2018    AST 29 10/01/2018    ALT 55 10/01/2018    ALKPHOS 97 10/01/2018    PROT 6 3 08/16/2017    BILITOT 0 5 08/16/2017    EGFR 107 10/01/2018      IgG 906, IgM 210, IgA 118,             ROS:   Review of Systems   Constitutional: Negative for activity change, appetite change, chills, diaphoresis, fatigue, fever and unexpected weight change  HENT: Negative for congestion, dental problem, facial swelling, hearing loss, mouth sores, nosebleeds, postnasal drip, rhinorrhea, sore throat, trouble swallowing and voice change  Eyes: Negative for photophobia, pain, discharge, redness, itching and visual disturbance  Respiratory: Negative for cough, choking, chest tightness, shortness of breath and wheezing  Cardiovascular: Negative for chest pain, palpitations and leg swelling  Gastrointestinal: Negative for abdominal distention, abdominal pain, anal bleeding, blood in stool, constipation, diarrhea, nausea, rectal pain and vomiting  Endocrine: Negative for cold intolerance and heat intolerance     Genitourinary: Negative for decreased urine volume, difficulty urinating, dysuria, flank pain, frequency, hematuria and urgency  Musculoskeletal: Negative for arthralgias, back pain, gait problem, joint swelling, myalgias, neck pain and neck stiffness  Skin: Negative for color change, pallor, rash and wound  Allergic/Immunologic: Negative for immunocompromised state  Neurological: Negative for dizziness, tremors, seizures, syncope, facial asymmetry, speech difficulty, weakness, light-headedness, numbness and headaches  Hematological: Negative for adenopathy  Does not bruise/bleed easily  Psychiatric/Behavioral: Negative for agitation, confusion, decreased concentration, dysphoric mood and sleep disturbance  The patient is not nervous/anxious  All other systems reviewed and are negative  Current Medications: Reviewed  Allergies: Reviewed  PMH/FH/SH:  Reviewed      Physical Exam:    Body surface area is 2 18 meters squared  Wt Readings from Last 3 Encounters:   10/02/18 93 4 kg (206 lb)   08/10/18 93 4 kg (206 lb)   06/15/18 94 5 kg (208 lb 5 4 oz)        Temp Readings from Last 3 Encounters:   10/02/18 (!) 96 4 °F (35 8 °C) (Tympanic)   08/10/18 98 1 °F (36 7 °C) (Oral)   06/15/18 (!) 96 7 °F (35 9 °C) (Tympanic)        BP Readings from Last 3 Encounters:   10/02/18 120/60   08/10/18 124/70   06/15/18 114/75         Pulse Readings from Last 3 Encounters:   10/02/18 68   08/10/18 60   06/15/18 67        Physical Exam   Constitutional: He is oriented to person, place, and time  He appears well-developed and well-nourished  No distress  HENT:   Head: Normocephalic and atraumatic  Mouth/Throat: Oropharynx is clear and moist  No oropharyngeal exudate  Eyes: Pupils are equal, round, and reactive to light  Conjunctivae and EOM are normal    Neck: Normal range of motion  Neck supple  No tracheal deviation present  No thyromegaly present  Cardiovascular: Normal rate and regular rhythm  Exam reveals no gallop and no friction rub      No murmur heard  Pulmonary/Chest: Effort normal and breath sounds normal  No respiratory distress  He has no wheezes  He has no rales  He exhibits no tenderness  Abdominal: Soft  Bowel sounds are normal  He exhibits no distension and no mass  There is no tenderness  There is no rebound and no guarding  Musculoskeletal: Normal range of motion  He exhibits no edema  Lymphadenopathy:     He has no cervical adenopathy  Neurological: He is alert and oriented to person, place, and time  Skin: Skin is warm and dry  No rash noted  He is not diaphoretic  No erythema  No pallor  Psychiatric: He has a normal mood and affect  His behavior is normal  Judgment and thought content normal    Vitals reviewed  Goals and Barriers:  Current Goal: Minimize effects of disease  Barriers: None  Patient's Capacity to Self Care:  Patient is able to self care      Code Status: @[facility]@

## 2018-10-10 RX ORDER — ACETAMINOPHEN 325 MG/1
650 TABLET ORAL ONCE
Status: COMPLETED | OUTPATIENT
Start: 2018-10-12 | End: 2018-10-12

## 2018-10-12 ENCOUNTER — HOSPITAL ENCOUNTER (OUTPATIENT)
Dept: INFUSION CENTER | Facility: CLINIC | Age: 48
Discharge: HOME | End: 2018-10-12
Payer: COMMERCIAL

## 2018-10-12 VITALS
TEMPERATURE: 97.7 F | OXYGEN SATURATION: 98 % | BODY MASS INDEX: 26.9 KG/M2 | WEIGHT: 203 LBS | RESPIRATION RATE: 14 BRPM | HEIGHT: 73 IN | HEART RATE: 57 BPM | DIASTOLIC BLOOD PRESSURE: 74 MMHG | SYSTOLIC BLOOD PRESSURE: 108 MMHG

## 2018-10-12 PROCEDURE — 96413 CHEMO IV INFUSION 1 HR: CPT

## 2018-10-12 PROCEDURE — 96415 CHEMO IV INFUSION ADDL HR: CPT

## 2018-10-12 PROCEDURE — 96372 THER/PROPH/DIAG INJ SC/IM: CPT

## 2018-10-12 PROCEDURE — 96367 TX/PROPH/DG ADDL SEQ IV INF: CPT

## 2018-10-12 RX ADMIN — HEPARIN 300 UNITS: 100 SYRINGE at 12:20

## 2018-10-12 RX ADMIN — DIPHENHYDRAMINE HYDROCHLORIDE 25 MG: 50 INJECTION, SOLUTION INTRAMUSCULAR; INTRAVENOUS at 08:32

## 2018-10-12 RX ADMIN — RITUXIMAB 800 MG: 10 INJECTION, SOLUTION INTRAVENOUS at 09:22

## 2018-10-12 RX ADMIN — HYDROCORTISONE SODIUM SUCCINATE 100 MG: 100 INJECTION, POWDER, FOR SOLUTION INTRAMUSCULAR; INTRAVENOUS at 08:28

## 2018-10-12 RX ADMIN — ACETAMINOPHEN 650 MG: 325 TABLET, FILM COATED ORAL at 08:28

## 2018-10-12 NOTE — PLAN OF CARE
Problem: Potential for Falls  Goal: Patient will remain free of falls  INTERVENTIONS:  - Assess patient frequently for physical needs  -  Identify cognitive and physical deficits and behaviors that affect risk of falls    -  [place] fall precautions as indicated by assessment   - Educate patient/family on patient safety including physical limitations  - Instruct patient to call for assistance with activity based on assessment  - Modify environment to reduce risk of injury  - Consider OT/PT consult to assist with strengthening/mobility   Outcome: Progressing

## 2018-12-06 RX ORDER — SODIUM CHLORIDE 9 MG/ML
20 INJECTION, SOLUTION INTRAVENOUS CONTINUOUS
Status: DISCONTINUED | OUTPATIENT
Start: 2018-12-07 | End: 2018-12-10 | Stop reason: HOSPADM

## 2018-12-06 RX ORDER — ACETAMINOPHEN 325 MG/1
650 TABLET ORAL ONCE
Status: COMPLETED | OUTPATIENT
Start: 2018-12-07 | End: 2018-12-07

## 2018-12-07 ENCOUNTER — HOSPITAL ENCOUNTER (OUTPATIENT)
Dept: INFUSION CENTER | Facility: CLINIC | Age: 48
Discharge: HOME | End: 2018-12-07
Payer: COMMERCIAL

## 2018-12-07 VITALS
DIASTOLIC BLOOD PRESSURE: 72 MMHG | SYSTOLIC BLOOD PRESSURE: 116 MMHG | WEIGHT: 212 LBS | HEART RATE: 60 BPM | BODY MASS INDEX: 28.1 KG/M2 | RESPIRATION RATE: 18 BRPM | HEIGHT: 73 IN | TEMPERATURE: 97.6 F

## 2018-12-07 PROCEDURE — 96413 CHEMO IV INFUSION 1 HR: CPT

## 2018-12-07 PROCEDURE — 96415 CHEMO IV INFUSION ADDL HR: CPT

## 2018-12-07 PROCEDURE — 96375 TX/PRO/DX INJ NEW DRUG ADDON: CPT

## 2018-12-07 RX ADMIN — HYDROCORTISONE SODIUM SUCCINATE 100 MG: 100 INJECTION, POWDER, FOR SOLUTION INTRAMUSCULAR; INTRAVENOUS at 08:21

## 2018-12-07 RX ADMIN — RITUXIMAB 800 MG: 10 INJECTION, SOLUTION INTRAVENOUS at 08:46

## 2018-12-07 RX ADMIN — DIPHENHYDRAMINE HYDROCHLORIDE 25 MG: 50 INJECTION, SOLUTION INTRAMUSCULAR; INTRAVENOUS at 08:21

## 2018-12-07 RX ADMIN — SODIUM CHLORIDE 20 ML/HR: 0.9 INJECTION, SOLUTION INTRAVENOUS at 08:10

## 2018-12-07 RX ADMIN — HEPARIN 300 UNITS: 100 SYRINGE at 11:40

## 2018-12-07 RX ADMIN — ACETAMINOPHEN 650 MG: 325 TABLET, FILM COATED ORAL at 08:21

## 2018-12-07 NOTE — PROGRESS NOTES
Pt here for maintenance Rituxan for treatment of his follicular lymphoma  Vitals stable  [place] within reach; will continue to monitor

## 2019-02-05 ENCOUNTER — OFFICE VISIT (OUTPATIENT)
Dept: HEMATOLOGY ONCOLOGY | Facility: CLINIC | Age: 49
End: 2019-02-05
Payer: COMMERCIAL

## 2019-02-05 VITALS
WEIGHT: 215 LBS | OXYGEN SATURATION: 96 % | RESPIRATION RATE: 18 BRPM | HEIGHT: 73 IN | SYSTOLIC BLOOD PRESSURE: 100 MMHG | BODY MASS INDEX: 28.49 KG/M2 | TEMPERATURE: 97.3 F | HEART RATE: 76 BPM | DIASTOLIC BLOOD PRESSURE: 70 MMHG

## 2019-02-05 DIAGNOSIS — C82.38 GRADE 3A FOLLICULAR LYMPHOMA OF LYMPH NODES OF MULTIPLE REGIONS (HCC): Primary | ICD-10-CM

## 2019-02-05 PROCEDURE — 99214 OFFICE O/P EST MOD 30 MIN: CPT | Performed by: INTERNAL MEDICINE

## 2019-02-05 NOTE — LETTER
February 5, 2019     Kika Panda, [address]  [address] [place] [address]    Patient: Nhung Quezada   YOB: 1970   Date of Visit: 2/5/2019       Dear Dr Ivon James:    Thank you for referring Nhung Quezada to me for evaluation  Below are my notes for this consultation  If you have questions, please do not hesitate to call me  I look forward to following your patient along with you  Sincerely,        Diego Wilkinson MD        CC: MD Diego Ramires MD  2/5/2019  8:37 AM  Sign at close encounter  Hematology Outpatient Follow - Up Note  Nhung Quezada 47 y o  male MRN: @ Encounter: [id number]        Date:  2/5/2019        Assessment/ Plan:  He is 50-year-old  male with stage IV follicular lymphoma grade 3B when he presented with splenomegaly, weight loss, multiple lymphadenopathy above and below the diaphragm, bone marrow biopsy showed 10% involvement with follicular lymphoma, excisional biopsy of the retroperitoneal lymph node confirm the diagnosis as well, a 2nd opinion at [address] [place] [address] agreed on treatment with R-CHOP, he received 6 cycles of R-CHOP beginning in May 2017 subsequent PET scan showed no evidence of disease physical examination showed disappearance of lymphadenopathy and splenomegaly    Currently on maintenance rituximab 375 milligram/meter squared every 2 months, proceed with dose 8   And 9    Follow-up in 4 months with CBC, CMP, LDH    He was advised to call if he has any constitutional symptoms, subjective lymphadenopathy early satiety    Again we decided with normal physical examination, laps not to proceed with PET scan until finishing maintenance rituximab in September 2019          HPI:  50-year-old  male with history of abdominal pain, weight loss prior to the presentation in 2016, he was found to have right submandibular lymphadenopathy, needle biopsy was not conclusive, subsequently CT scan showed bilateral axillary lymphadenopathy in May 2017 also splenomegaly, extensive retroperitoneal lymphadenopathy, adenopathy in the esophageal gastric junction, bilateral shotty lymphadenopathy in the both axilla, he had normal CBC except for thrombocytopenia with platelet count of [id number] normal differential, normal IgM, IgG, IgA, LDH was 166, kappa light chain of 78, lambda light chain of 17, he had retroperitoneal lymph node biopsy showed B-cell lymphoma, positive for CD 20, CD 10, negative for CD 23, the biopsy sent for 2nd opinion at [address] [place] [address], showed grade 3 B follicular lymphoma  A bone marrow biopsy showed 10% involvement with lymphoma  He was treated with R-CHOP from June 17 until October 2017 for total of 6 cycles, with PET scan after cycle 3  Showed significant response, repeat PET scan after 6 cycles of chemotherapy showed no evidence of disease  Currently on maintenance rituximab every 2 months initiated in November 2017 for total of 2 years      Interval History:  Was evaluated at [address] [place] [address], CBC, CMP were within normal limits, there was no complain and physical examination was reported normal       Previous Treatment:         Test Results:    Imaging: No results found      Labs:   Lab Results   Component Value Date    WBC 6 83 10/01/2018    HGB 15 1 10/01/2018    HCT 45 9 10/01/2018    MCV 88 10/01/2018     10/01/2018     Lab Results   Component Value Date     08/16/2017    K 4 5 10/01/2018     10/01/2018    CO2 30 10/01/2018    BUN 12 10/01/2018    CREATININE 0 77 10/01/2018    GLUF 106 (H) 09/29/2017    CALCIUM 9 0 10/01/2018    AST 29 10/01/2018    ALT 55 10/01/2018    ALKPHOS 97 10/01/2018    PROT 6 3 08/16/2017    BILITOT 0 5 08/16/2017    EGFR 107 10/01/2018       No results found for: IRON, TIBC, FERRITIN    No results found for: [id number]      ROS:   Review of Systems   Constitutional: Negative for activity change, appetite change, chills, diaphoresis, fatigue, fever and unexpected weight change  HENT: Negative for congestion, dental problem, facial swelling, hearing loss, mouth sores, nosebleeds, postnasal drip, rhinorrhea, sore throat, trouble swallowing and voice change  Eyes: Negative for photophobia, pain, discharge, redness, itching and visual disturbance  Respiratory: Negative for cough, choking, chest tightness, shortness of breath and wheezing  Cardiovascular: Negative for chest pain, palpitations and leg swelling  Gastrointestinal: Negative for abdominal distention, abdominal pain, anal bleeding, blood in stool, constipation, diarrhea, nausea, rectal pain and vomiting  Endocrine: Negative for cold intolerance and heat intolerance  Genitourinary: Negative for decreased urine volume, difficulty urinating, dysuria, flank pain, frequency, hematuria and urgency  Musculoskeletal: Negative for arthralgias, back pain, gait problem, joint swelling, myalgias, neck pain and neck stiffness  Skin: Negative for color change, pallor, rash and wound  Allergic/Immunologic: Negative for immunocompromised state  Neurological: Negative for dizziness, tremors, seizures, syncope, facial asymmetry, speech difficulty, weakness, light-headedness, numbness and headaches  Hematological: Negative for adenopathy  Does not bruise/bleed easily  Psychiatric/Behavioral: Negative for agitation, confusion, decreased concentration, dysphoric mood and sleep disturbance  The patient is not nervous/anxious  All other systems reviewed and are negative  Current Medications: Reviewed  Allergies: Reviewed  PMH/FH/SH:  Reviewed      Physical Exam:    Body surface area is 2 22 meters squared      Wt Readings from Last 3 Encounters:   02/05/19 97 5 kg (215 lb)   12/07/18 96 2 kg (212 lb)   10/12/18 92 1 kg (203 lb)        Temp Readings from Last 3 Encounters:   02/05/19 (!) 97 3 °F (36 3 °C) (Tympanic)   12/07/18 97 6 °F (36 4 °C) (Oral)   10/12/18 97 7 °F (36 5 °C) (Oral) BP Readings from Last 3 Encounters:   02/05/19 100/70   12/07/18 116/72   10/12/18 108/74         Pulse Readings from Last 3 Encounters:   02/05/19 76   12/07/18 60   10/12/18 57        Physical Exam   Constitutional: He is oriented to person, place, and time  He appears well-developed and well-nourished  No distress  HENT:   Head: Normocephalic and atraumatic  Mouth/Throat: Oropharynx is clear and moist  No oropharyngeal exudate  Eyes: Pupils are equal, round, and reactive to light  Conjunctivae and EOM are normal    Neck: Normal range of motion  Neck supple  No tracheal deviation present  No thyromegaly present  Cardiovascular: Normal rate and regular rhythm  Exam reveals no gallop and no friction rub  No murmur heard  Pulmonary/Chest: Effort normal and breath sounds normal  No respiratory distress  He has no wheezes  He has no rales  He exhibits no tenderness  Abdominal: Soft  Bowel sounds are normal  He exhibits no distension and no mass  There is no tenderness  There is no rebound and no guarding  Musculoskeletal: Normal range of motion  He exhibits no edema  Lymphadenopathy:     He has no cervical adenopathy  Neurological: He is alert and oriented to person, place, and time  Skin: Skin is warm and dry  No rash noted  He is not diaphoretic  No erythema  No pallor  Psychiatric: He has a normal mood and affect  His behavior is normal  Judgment and thought content normal    Vitals reviewed  Goals and Barriers:  Current Goal: Minimize effects of disease  Barriers: None  Patient's Capacity to Self Care:  Patient is able to self care      Code Status: [unfilled]

## 2019-02-05 NOTE — PROGRESS NOTES
Hematology Outpatient Follow - Up Note  Jose Ceron 47 y o  male MRN: @ Encounter: [id number]        Date:  2/5/2019        Assessment/ Plan:  He is 58-year-old  male with stage IV follicular lymphoma grade 3B when he presented with splenomegaly, weight loss, multiple lymphadenopathy above and below the diaphragm, bone marrow biopsy showed 10% involvement with follicular lymphoma, excisional biopsy of the retroperitoneal lymph node confirm the diagnosis as well, a 2nd opinion at [address] [place] [address] agreed on treatment with R-CHOP, he received 6 cycles of R-CHOP beginning in May 2017 subsequent PET scan showed no evidence of disease physical examination showed disappearance of lymphadenopathy and splenomegaly    Currently on maintenance rituximab 375 milligram/meter squared every 2 months, proceed with dose 8   And 9    Follow-up in 4 months with CBC, CMP, LDH    He was advised to call if he has any constitutional symptoms, subjective lymphadenopathy early satiety    Again we decided with normal physical examination, laps not to proceed with PET scan until finishing maintenance rituximab in September 2019          HPI:  58-year-old  male with history of abdominal pain, weight loss prior to the presentation in 2016, he was found to have right submandibular lymphadenopathy, needle biopsy was not conclusive, subsequently CT scan showed bilateral axillary lymphadenopathy in May 2017 also splenomegaly, extensive retroperitoneal lymphadenopathy, adenopathy in the esophageal gastric junction, bilateral shotty lymphadenopathy in the both axilla, he had normal CBC except for thrombocytopenia with platelet count of [id number] normal differential, normal IgM, IgG, IgA, LDH was 166, kappa light chain of 78, lambda light chain of 17, he had retroperitoneal lymph node biopsy showed B-cell lymphoma, positive for CD 20, CD 10, negative for CD 23, the biopsy sent [facility]nd opinion at [facility], showed grade 3 B follicular lymphoma  A bone marrow biopsy showed 10% involvement with lymphoma  He was treated with R-CHOP from June 17 until October 2017 for total of 6 cycles, with PET scan after cycle 3  Showed significant response, repeat PET scan after 6 cycles of chemotherapy showed no evidence of disease  Currently on maintenance rituximab every 2 months initiated in November 2017 for total of 2 years      Interval History:  Was evaluated at [address], CBC, CMP were within normal limits, there was no complain and physical examination was reported normal       Previous Treatment:         Test Results:    Imaging: No results found  Labs:   Lab Results   Component Value Date    WBC 6 83 10/01/2018    HGB 15 1 10/01/2018    HCT 45 9 10/01/2018    MCV 88 10/01/2018     10/01/2018     Lab Results   Component Value Date     08/16/2017    K 4 5 10/01/2018     10/01/2018    CO2 30 10/01/2018    BUN 12 10/01/2018    CREATININE 0 77 10/01/2018    GLUF 106 (H) 09/29/2017    CALCIUM 9 0 10/01/2018    AST 29 10/01/2018    ALT 55 10/01/2018    ALKPHOS 97 10/01/2018    PROT 6 3 08/16/2017    BILITOT 0 5 08/16/2017    EGFR 107 10/01/2018       No results found for: IRON, TIBC, FERRITIN    No results found for: [id number]      ROS:   Review of Systems   Constitutional: Negative for activity change, appetite change, chills, diaphoresis, fatigue, fever and unexpected weight change  HENT: Negative for congestion, dental problem, facial swelling, hearing loss, mouth sores, nosebleeds, postnasal drip, rhinorrhea, sore throat, trouble swallowing and voice change  Eyes: Negative for photophobia, pain, discharge, redness, itching and visual disturbance  Respiratory: Negative for cough, choking, chest tightness, shortness of breath and wheezing  Cardiovascular: Negative for chest pain, palpitations and leg swelling     Gastrointestinal: Negative for abdominal distention, abdominal pain, anal bleeding, blood in stool, constipation, diarrhea, nausea, rectal pain and vomiting  Endocrine: Negative for cold intolerance and heat intolerance  Genitourinary: Negative for decreased urine volume, difficulty urinating, dysuria, flank pain, frequency, hematuria and urgency  Musculoskeletal: Negative for arthralgias, back pain, gait problem, joint swelling, myalgias, neck pain and neck stiffness  Skin: Negative for color change, pallor, rash and wound  Allergic/Immunologic: Negative for immunocompromised state  Neurological: Negative for dizziness, tremors, seizures, syncope, facial asymmetry, speech difficulty, weakness, light-headedness, numbness and headaches  Hematological: Negative for adenopathy  Does not bruise/bleed easily  Psychiatric/Behavioral: Negative for agitation, confusion, decreased concentration, dysphoric mood and sleep disturbance  The patient is not nervous/anxious  All other systems reviewed and are negative  Current Medications: Reviewed  Allergies: Reviewed  PMH/FH/SH:  Reviewed      Physical Exam:    Body surface area is 2 22 meters squared  Wt Readings from Last 3 Encounters:   02/05/19 97 5 kg (215 lb)   12/07/18 96 2 kg (212 lb)   10/12/18 92 1 kg (203 lb)        Temp Readings from Last 3 Encounters:   02/05/19 (!) 97 3 °F (36 3 °C) (Tympanic)   12/07/18 97 6 °F (36 4 °C) (Oral)   10/12/18 97 7 °F (36 5 °C) (Oral)        BP Readings from Last 3 Encounters:   02/05/19 100/70   12/07/18 116/72   10/12/18 108/74         Pulse Readings from Last 3 Encounters:   02/05/19 76   12/07/18 60   10/12/18 57        Physical Exam   Constitutional: He is oriented to person, place, and time  He appears well-developed and well-nourished  No distress  HENT:   Head: Normocephalic and atraumatic  Mouth/Throat: Oropharynx is clear and moist  No oropharyngeal exudate  Eyes: Pupils are equal, round, and reactive to light   Conjunctivae and EOM are normal    Neck: Normal range of motion  Neck supple  No tracheal deviation present  No thyromegaly present  Cardiovascular: Normal rate and regular rhythm  Exam reveals no gallop and no friction rub  No murmur heard  Pulmonary/Chest: Effort normal and breath sounds normal  No respiratory distress  He has no wheezes  He has no rales  He exhibits no tenderness  Abdominal: Soft  Bowel sounds are normal  He exhibits no distension and no mass  There is no tenderness  There is no rebound and no guarding  Musculoskeletal: Normal range of motion  He exhibits no edema  Lymphadenopathy:     He has no cervical adenopathy  Neurological: He is alert and oriented to person, place, and time  Skin: Skin is warm and dry  No rash noted  He is not diaphoretic  No erythema  No pallor  Psychiatric: He has a normal mood and affect  His behavior is normal  Judgment and thought content normal    Vitals reviewed  Goals and Barriers:  Current Goal: Minimize effects of disease  Barriers: None  Patient's Capacity to Self Care:  Patient is able to self care      Code Status: [unfilled]

## 2019-02-07 RX ORDER — SODIUM CHLORIDE 9 MG/ML
20 INJECTION, SOLUTION INTRAVENOUS CONTINUOUS
Status: DISCONTINUED | OUTPATIENT
Start: 2019-02-08 | End: 2019-02-11 | Stop reason: HOSPADM

## 2019-02-07 RX ORDER — ACETAMINOPHEN 325 MG/1
650 TABLET ORAL ONCE
Status: COMPLETED | OUTPATIENT
Start: 2019-02-08 | End: 2019-02-08

## 2019-02-08 ENCOUNTER — HOSPITAL ENCOUNTER (OUTPATIENT)
Dept: INFUSION CENTER | Facility: CLINIC | Age: 49
Discharge: HOME | End: 2019-02-08
Payer: COMMERCIAL

## 2019-02-08 VITALS
OXYGEN SATURATION: 90 % | RESPIRATION RATE: 18 BRPM | WEIGHT: 213.5 LBS | BODY MASS INDEX: 28.3 KG/M2 | SYSTOLIC BLOOD PRESSURE: 123 MMHG | HEIGHT: 73 IN | TEMPERATURE: 97.3 F | DIASTOLIC BLOOD PRESSURE: 73 MMHG | HEART RATE: 69 BPM

## 2019-02-08 PROCEDURE — 96415 CHEMO IV INFUSION ADDL HR: CPT

## 2019-02-08 PROCEDURE — 96375 TX/PRO/DX INJ NEW DRUG ADDON: CPT

## 2019-02-08 PROCEDURE — 96367 TX/PROPH/DG ADDL SEQ IV INF: CPT

## 2019-02-08 PROCEDURE — 96413 CHEMO IV INFUSION 1 HR: CPT

## 2019-02-08 RX ADMIN — ACETAMINOPHEN 650 MG: 325 TABLET ORAL at 08:32

## 2019-02-08 RX ADMIN — DIPHENHYDRAMINE HYDROCHLORIDE 25 MG: 50 INJECTION, SOLUTION INTRAMUSCULAR; INTRAVENOUS at 08:35

## 2019-02-08 RX ADMIN — RITUXIMAB 800 MG: 10 INJECTION, SOLUTION INTRAVENOUS at 09:16

## 2019-02-08 RX ADMIN — SODIUM CHLORIDE 20 ML/HR: 0.9 INJECTION, SOLUTION INTRAVENOUS at 08:22

## 2019-02-08 RX ADMIN — HYDROCORTISONE SODIUM SUCCINATE 100 MG: 100 INJECTION, POWDER, FOR SOLUTION INTRAMUSCULAR; INTRAVENOUS at 08:33

## 2019-04-10 RX ORDER — ACETAMINOPHEN 325 MG/1
650 TABLET ORAL ONCE
Status: COMPLETED | OUTPATIENT
Start: 2019-04-11 | End: 2019-04-11

## 2019-04-10 RX ORDER — SODIUM CHLORIDE 9 MG/ML
20 INJECTION, SOLUTION INTRAVENOUS CONTINUOUS
Status: DISCONTINUED | OUTPATIENT
Start: 2019-04-11 | End: 2019-04-12 | Stop reason: HOSPADM

## 2019-04-11 ENCOUNTER — HOSPITAL ENCOUNTER (OUTPATIENT)
Dept: INFUSION CENTER | Facility: CLINIC | Age: 49
Discharge: HOME | End: 2019-04-11
Payer: COMMERCIAL

## 2019-04-11 VITALS
OXYGEN SATURATION: 98 % | DIASTOLIC BLOOD PRESSURE: 68 MMHG | TEMPERATURE: 97.6 F | SYSTOLIC BLOOD PRESSURE: 116 MMHG | HEART RATE: 67 BPM | BODY MASS INDEX: 28.63 KG/M2 | WEIGHT: 216 LBS | RESPIRATION RATE: 14 BRPM | HEIGHT: 73 IN

## 2019-04-11 PROCEDURE — 96375 TX/PRO/DX INJ NEW DRUG ADDON: CPT

## 2019-04-11 PROCEDURE — 96367 TX/PROPH/DG ADDL SEQ IV INF: CPT

## 2019-04-11 PROCEDURE — 96415 CHEMO IV INFUSION ADDL HR: CPT

## 2019-04-11 PROCEDURE — 96413 CHEMO IV INFUSION 1 HR: CPT

## 2019-04-11 RX ADMIN — DIPHENHYDRAMINE HYDROCHLORIDE 25 MG: 50 INJECTION, SOLUTION INTRAMUSCULAR; INTRAVENOUS at 08:30

## 2019-04-11 RX ADMIN — HYDROCORTISONE SODIUM SUCCINATE 100 MG: 100 INJECTION, POWDER, FOR SOLUTION INTRAMUSCULAR; INTRAVENOUS at 08:27

## 2019-04-11 RX ADMIN — RITUXIMAB 800 MG: 10 INJECTION, SOLUTION INTRAVENOUS at 08:55

## 2019-04-11 RX ADMIN — ACETAMINOPHEN 650 MG: 325 TABLET, FILM COATED ORAL at 08:13

## 2019-04-11 RX ADMIN — SODIUM CHLORIDE 20 ML/HR: 0.9 INJECTION, SOLUTION INTRAVENOUS at 08:13

## 2019-04-11 NOTE — PROGRESS NOTES
Patient tolerated treatment today without issues   Patient verified upcoming appointment and declined AVS

## 2019-04-29 DIAGNOSIS — C82.38 GRADE 3A FOLLICULAR LYMPHOMA OF LYMPH NODES OF MULTIPLE REGIONS (HCC): Primary | ICD-10-CM

## 2019-04-29 RX ORDER — SODIUM CHLORIDE 9 MG/ML
20 INJECTION, SOLUTION INTRAVENOUS ONCE
Status: CANCELLED | OUTPATIENT
Start: 2019-06-14

## 2019-04-29 RX ORDER — ACETAMINOPHEN 325 MG/1
650 TABLET ORAL ONCE
Status: CANCELLED | OUTPATIENT
Start: 2019-08-09

## 2019-04-29 RX ORDER — SODIUM CHLORIDE 9 MG/ML
20 INJECTION, SOLUTION INTRAVENOUS ONCE
Status: CANCELLED | OUTPATIENT
Start: 2019-08-09

## 2019-04-29 RX ORDER — ACETAMINOPHEN 325 MG/1
650 TABLET ORAL ONCE
Status: CANCELLED | OUTPATIENT
Start: 2019-06-14

## 2019-06-06 ENCOUNTER — APPOINTMENT (OUTPATIENT)
Dept: LAB | Age: 49
End: 2019-06-06
Payer: COMMERCIAL

## 2019-06-06 DIAGNOSIS — C82.38 GRADE 3A FOLLICULAR LYMPHOMA OF LYMPH NODES OF MULTIPLE REGIONS (HCC): ICD-10-CM

## 2019-06-06 LAB
ALBUMIN SERPL BCP-MCNC: 3.8 G/DL (ref 3.5–5)
ALP SERPL-CCNC: 71 U/L (ref 46–116)
ALT SERPL W P-5'-P-CCNC: 44 U/L (ref 12–78)
ANION GAP SERPL CALCULATED.3IONS-SCNC: 6 MMOL/L (ref 4–13)
AST SERPL W P-5'-P-CCNC: 24 U/L (ref 5–45)
BASOPHILS # BLD AUTO: 0.06 THOUSANDS/ΜL (ref 0–0.1)
BASOPHILS NFR BLD AUTO: 1 % (ref 0–1)
BILIRUB SERPL-MCNC: 1.09 MG/DL (ref 0.2–1)
BUN SERPL-MCNC: 15 MG/DL (ref 5–25)
CALCIUM SERPL-MCNC: 8.5 MG/DL (ref 8.3–10.1)
CHLORIDE SERPL-SCNC: 106 MMOL/L (ref 100–108)
CO2 SERPL-SCNC: 26 MMOL/L (ref 21–32)
CREAT SERPL-MCNC: 0.88 MG/DL (ref 0.6–1.3)
EOSINOPHIL # BLD AUTO: 0.19 THOUSAND/ΜL (ref 0–0.61)
EOSINOPHIL NFR BLD AUTO: 3 % (ref 0–6)
ERYTHROCYTE [DISTWIDTH] IN BLOOD BY AUTOMATED COUNT: 12.8 % (ref 11.6–15.1)
GFR SERPL CREATININE-BSD FRML MDRD: 100 ML/MIN/1.73SQ M
GLUCOSE SERPL-MCNC: 89 MG/DL (ref 65–140)
HCT VFR BLD AUTO: 42.2 % (ref 36.5–49.3)
HGB BLD-MCNC: 14.2 G/DL (ref 12–17)
IMM GRANULOCYTES # BLD AUTO: 0.02 THOUSAND/UL (ref 0–0.2)
IMM GRANULOCYTES NFR BLD AUTO: 0 % (ref 0–2)
LYMPHOCYTES # BLD AUTO: 1.14 THOUSANDS/ΜL (ref 0.6–4.47)
LYMPHOCYTES NFR BLD AUTO: 17 % (ref 14–44)
MCH RBC QN AUTO: 29.3 PG (ref 26.8–34.3)
MCHC RBC AUTO-ENTMCNC: 33.6 G/DL (ref 31.4–37.4)
MCV RBC AUTO: 87 FL (ref 82–98)
MONOCYTES # BLD AUTO: 0.61 THOUSAND/ΜL (ref 0.17–1.22)
MONOCYTES NFR BLD AUTO: 9 % (ref 4–12)
NEUTROPHILS # BLD AUTO: 4.61 THOUSANDS/ΜL (ref 1.85–7.62)
NEUTS SEG NFR BLD AUTO: 70 % (ref 43–75)
NRBC BLD AUTO-RTO: 0 /100 WBCS
PLATELET # BLD AUTO: 162 THOUSANDS/UL (ref 149–390)
PMV BLD AUTO: 11.6 FL (ref 8.9–12.7)
POTASSIUM SERPL-SCNC: 4.2 MMOL/L (ref 3.5–5.3)
PROT SERPL-MCNC: 6.5 G/DL (ref 6.4–8.2)
RBC # BLD AUTO: 4.84 MILLION/UL (ref 3.88–5.62)
SODIUM SERPL-SCNC: 138 MMOL/L (ref 136–145)
WBC # BLD AUTO: 6.63 THOUSAND/UL (ref 4.31–10.16)

## 2019-06-06 PROCEDURE — 36415 COLL VENOUS BLD VENIPUNCTURE: CPT

## 2019-06-06 PROCEDURE — 85025 COMPLETE CBC W/AUTO DIFF WBC: CPT

## 2019-06-06 PROCEDURE — 80053 COMPREHEN METABOLIC PANEL: CPT

## 2019-06-07 ENCOUNTER — OFFICE VISIT (OUTPATIENT)
Dept: HEMATOLOGY ONCOLOGY | Facility: CLINIC | Age: 49
End: 2019-06-07
Payer: COMMERCIAL

## 2019-06-07 VITALS
WEIGHT: 212 LBS | HEIGHT: 73 IN | RESPIRATION RATE: 18 BRPM | OXYGEN SATURATION: 96 % | DIASTOLIC BLOOD PRESSURE: 68 MMHG | BODY MASS INDEX: 28.1 KG/M2 | SYSTOLIC BLOOD PRESSURE: 110 MMHG | TEMPERATURE: 97.8 F | HEART RATE: 71 BPM

## 2019-06-07 DIAGNOSIS — C82.38 GRADE 3A FOLLICULAR LYMPHOMA OF LYMPH NODES OF MULTIPLE REGIONS (HCC): Primary | ICD-10-CM

## 2019-06-07 PROCEDURE — 99214 OFFICE O/P EST MOD 30 MIN: CPT | Performed by: INTERNAL MEDICINE

## 2019-06-07 RX ORDER — ACETAMINOPHEN 325 MG/1
650 TABLET ORAL ONCE
Status: CANCELLED | OUTPATIENT
Start: 2019-10-11

## 2019-06-07 RX ORDER — SODIUM CHLORIDE 9 MG/ML
20 INJECTION, SOLUTION INTRAVENOUS ONCE
Status: CANCELLED | OUTPATIENT
Start: 2019-10-11

## 2019-06-14 ENCOUNTER — HOSPITAL ENCOUNTER (OUTPATIENT)
Dept: INFUSION CENTER | Facility: CLINIC | Age: 49
Discharge: HOME | End: 2019-06-14
Payer: COMMERCIAL

## 2019-06-14 VITALS
HEIGHT: 73 IN | WEIGHT: 210.5 LBS | HEART RATE: 70 BPM | DIASTOLIC BLOOD PRESSURE: 70 MMHG | TEMPERATURE: 98.6 F | SYSTOLIC BLOOD PRESSURE: 112 MMHG | RESPIRATION RATE: 18 BRPM | BODY MASS INDEX: 27.9 KG/M2

## 2019-06-14 DIAGNOSIS — C82.38 GRADE 3A FOLLICULAR LYMPHOMA OF LYMPH NODES OF MULTIPLE REGIONS (HCC): Primary | ICD-10-CM

## 2019-06-14 PROCEDURE — 96415 CHEMO IV INFUSION ADDL HR: CPT

## 2019-06-14 PROCEDURE — 96375 TX/PRO/DX INJ NEW DRUG ADDON: CPT

## 2019-06-14 PROCEDURE — 96413 CHEMO IV INFUSION 1 HR: CPT

## 2019-06-14 PROCEDURE — 96367 TX/PROPH/DG ADDL SEQ IV INF: CPT

## 2019-06-14 RX ORDER — SODIUM CHLORIDE 9 MG/ML
20 INJECTION, SOLUTION INTRAVENOUS ONCE
Status: COMPLETED | OUTPATIENT
Start: 2019-06-14 | End: 2019-06-14

## 2019-06-14 RX ORDER — ACETAMINOPHEN 325 MG/1
650 TABLET ORAL ONCE
Status: COMPLETED | OUTPATIENT
Start: 2019-06-14 | End: 2019-06-14

## 2019-06-14 RX ADMIN — SODIUM CHLORIDE 20 ML/HR: 0.9 INJECTION, SOLUTION INTRAVENOUS at 08:13

## 2019-06-14 RX ADMIN — HYDROCORTISONE SODIUM SUCCINATE 100 MG: 100 INJECTION, POWDER, FOR SOLUTION INTRAMUSCULAR; INTRAVENOUS at 08:10

## 2019-06-14 RX ADMIN — RITUXIMAB 800 MG: 10 INJECTION, SOLUTION INTRAVENOUS at 09:14

## 2019-06-14 RX ADMIN — ACETAMINOPHEN 650 MG: 325 TABLET, FILM COATED ORAL at 08:10

## 2019-06-14 RX ADMIN — DIPHENHYDRAMINE HYDROCHLORIDE 25 MG: 50 INJECTION, SOLUTION INTRAMUSCULAR; INTRAVENOUS at 08:30

## 2019-06-14 NOTE — PROGRESS NOTES
Patient arrived for 10th cycle of maintenance Rituxan  Offers no complaints  This is first patient visit since [facility] conversion  Rituxan rate in [facility] auto populated as initial Rituxan rate    50mg/hour, nicrease by 50mg every 30 minutes  Patient has tolerated subsequent titration   100mg per hour increasing by 100mg every 30 minutes for all his maintenance infusions  Spoke with Royer Oconnell in Dr Namita Rush office  OK to run Rituxan at increased infusion rate today

## 2019-06-14 NOTE — PATIENT INSTRUCTIONS
June 2019 Sunday Monday Tuesday Wednesday Thursday Friday Saturday                                 1                2     3     4     5     6    LAB WALK IN   7:55 AM   (5 min )   BE SLN CHAIR 1   [facility] [place] [address]    FOLLOW UP PG   8:15 AM   (30 min )   MD Alina Lizama [facility] [place] 8                9     10     11     12     13     14    INF IV-MED-RITUXAN 4 HOURS   8:00 AM   (330 min )   AN INF CHAIR [address] 15            Cycle 1, Day 1    16     17     18     19     20     21     22                23     24     25     26     27     28     29                30                                                   Treatment Details       6/14/2019 - Cycle 1, Day 1      Chemotherapy: RITUXIMAB FIRST TITRATED CHEMO INFUSION

## 2019-07-01 ENCOUNTER — APPOINTMENT (OUTPATIENT)
Dept: LAB | Age: 49
End: 2019-07-01
Payer: COMMERCIAL

## 2019-07-01 ENCOUNTER — TRANSCRIBE ORDERS (OUTPATIENT)
Dept: ADMINISTRATIVE | Age: 49
End: 2019-07-01

## 2019-07-01 DIAGNOSIS — R74.01 NONSPECIFIC ELEVATION OF LEVELS OF TRANSAMINASE OR LACTIC ACID DEHYDROGENASE (LDH): Primary | ICD-10-CM

## 2019-07-01 DIAGNOSIS — R74.02 NONSPECIFIC ELEVATION OF LEVELS OF TRANSAMINASE OR LACTIC ACID DEHYDROGENASE (LDH): ICD-10-CM

## 2019-07-01 DIAGNOSIS — R74.01 NONSPECIFIC ELEVATION OF LEVELS OF TRANSAMINASE OR LACTIC ACID DEHYDROGENASE (LDH): ICD-10-CM

## 2019-07-01 DIAGNOSIS — R74.02 NONSPECIFIC ELEVATION OF LEVELS OF TRANSAMINASE OR LACTIC ACID DEHYDROGENASE (LDH): Primary | ICD-10-CM

## 2019-07-01 LAB
CHOLEST SERPL-MCNC: 207 MG/DL (ref 50–200)
FERRITIN SERPL-MCNC: 187 NG/ML (ref 8–388)
HDLC SERPL-MCNC: 38 MG/DL (ref 40–60)
IRON SERPL-MCNC: 103 UG/DL (ref 65–175)
LDLC SERPL CALC-MCNC: 130 MG/DL (ref 0–100)
NONHDLC SERPL-MCNC: 169 MG/DL
TRANSFERRIN SERPL-MCNC: 286 MG/DL (ref 200–400)
TRIGL SERPL-MCNC: 194 MG/DL

## 2019-07-01 PROCEDURE — 36415 COLL VENOUS BLD VENIPUNCTURE: CPT

## 2019-07-01 PROCEDURE — 80061 LIPID PANEL: CPT

## 2019-07-01 PROCEDURE — 82103 ALPHA-1-ANTITRYPSIN TOTAL: CPT

## 2019-07-01 PROCEDURE — 87340 HEPATITIS B SURFACE AG IA: CPT

## 2019-07-01 PROCEDURE — 83516 IMMUNOASSAY NONANTIBODY: CPT

## 2019-07-01 PROCEDURE — 84165 PROTEIN E-PHORESIS SERUM: CPT | Performed by: PATHOLOGY

## 2019-07-01 PROCEDURE — 86706 HEP B SURFACE ANTIBODY: CPT

## 2019-07-01 PROCEDURE — 86038 ANTINUCLEAR ANTIBODIES: CPT

## 2019-07-01 PROCEDURE — 82390 ASSAY OF CERULOPLASMIN: CPT

## 2019-07-01 PROCEDURE — 83540 ASSAY OF IRON: CPT

## 2019-07-01 PROCEDURE — 86376 MICROSOMAL ANTIBODY EACH: CPT

## 2019-07-01 PROCEDURE — 84466 ASSAY OF TRANSFERRIN: CPT

## 2019-07-01 PROCEDURE — 86235 NUCLEAR ANTIGEN ANTIBODY: CPT

## 2019-07-01 PROCEDURE — 82728 ASSAY OF FERRITIN: CPT

## 2019-07-01 PROCEDURE — 86708 HEPATITIS A ANTIBODY: CPT

## 2019-07-01 PROCEDURE — 86803 HEPATITIS C AB TEST: CPT

## 2019-07-01 PROCEDURE — 84165 PROTEIN E-PHORESIS SERUM: CPT

## 2019-07-01 PROCEDURE — 82104 ALPHA-1-ANTITRYPSIN PHENO: CPT

## 2019-07-02 LAB
ACTIN IGG SERPL-ACNC: 4 UNITS (ref 0–19)
ALBUMIN SERPL ELPH-MCNC: 4.57 G/DL (ref 3.5–5)
ALBUMIN SERPL ELPH-MCNC: 65.3 % (ref 52–65)
ALPHA1 GLOB SERPL ELPH-MCNC: 0.32 G/DL (ref 0.1–0.4)
ALPHA1 GLOB SERPL ELPH-MCNC: 4.5 % (ref 2.5–5)
ALPHA2 GLOB SERPL ELPH-MCNC: 0.57 G/DL (ref 0.4–1.2)
ALPHA2 GLOB SERPL ELPH-MCNC: 8.2 % (ref 7–13)
BETA GLOB ABNORMAL SERPL ELPH-MCNC: 0.41 G/DL (ref 0.4–0.8)
BETA1 GLOB SERPL ELPH-MCNC: 5.8 % (ref 5–13)
BETA2 GLOB SERPL ELPH-MCNC: 4.2 % (ref 2–8)
BETA2+GAMMA GLOB SERPL ELPH-MCNC: 0.29 G/DL (ref 0.2–0.5)
CERULOPLASMIN SERPL-MCNC: 26.3 MG/DL (ref 16–31)
GAMMA GLOB ABNORMAL SERPL ELPH-MCNC: 0.84 G/DL (ref 0.5–1.6)
GAMMA GLOB SERPL ELPH-MCNC: 12 % (ref 12–22)
HAV AB SER QL IA: REACTIVE
HBV SURFACE AB SER-ACNC: <3.1 MIU/ML
HBV SURFACE AG SER QL: NORMAL
HCV AB SER QL: NORMAL
IGG/ALB SER: 1.88 {RATIO} (ref 1.1–1.8)
LKM-1 AB SER-ACNC: <20.1 UNITS (ref 0–20)
PROT PATTERN SERPL ELPH-IMP: ABNORMAL
PROT SERPL-MCNC: 7 G/DL (ref 6.4–8.2)
TTG IGA SER-ACNC: <2 U/ML (ref 0–3)
TTG IGG SER-ACNC: <2 U/ML (ref 0–5)

## 2019-07-03 LAB
A1AT PHENOTYP SERPL IFE: NORMAL
A1AT SERPL-MCNC: 128 MG/DL (ref 90–200)
RYE IGE QN: NEGATIVE

## 2019-08-03 ENCOUNTER — TRANSCRIBE ORDERS (OUTPATIENT)
Dept: ADMINISTRATIVE | Age: 49
End: 2019-08-03

## 2019-08-03 ENCOUNTER — APPOINTMENT (OUTPATIENT)
Dept: LAB | Age: 49
End: 2019-08-03
Payer: COMMERCIAL

## 2019-08-03 DIAGNOSIS — R74.01 NONSPECIFIC ELEVATION OF LEVELS OF TRANSAMINASE OR LACTIC ACID DEHYDROGENASE (LDH): Primary | ICD-10-CM

## 2019-08-03 DIAGNOSIS — R74.02 NONSPECIFIC ELEVATION OF LEVELS OF TRANSAMINASE OR LACTIC ACID DEHYDROGENASE (LDH): ICD-10-CM

## 2019-08-03 DIAGNOSIS — R74.01 NONSPECIFIC ELEVATION OF LEVELS OF TRANSAMINASE OR LACTIC ACID DEHYDROGENASE (LDH): ICD-10-CM

## 2019-08-03 DIAGNOSIS — E78.5 HYPERLIPIDEMIA, UNSPECIFIED HYPERLIPIDEMIA TYPE: ICD-10-CM

## 2019-08-03 DIAGNOSIS — R74.02 NONSPECIFIC ELEVATION OF LEVELS OF TRANSAMINASE OR LACTIC ACID DEHYDROGENASE (LDH): Primary | ICD-10-CM

## 2019-08-03 PROCEDURE — 82465 ASSAY BLD/SERUM CHOLESTEROL: CPT

## 2019-08-03 PROCEDURE — 84478 ASSAY OF TRIGLYCERIDES: CPT

## 2019-08-03 PROCEDURE — 82947 ASSAY GLUCOSE BLOOD QUANT: CPT

## 2019-08-03 PROCEDURE — 84450 TRANSFERASE (AST) (SGOT): CPT

## 2019-08-03 PROCEDURE — 83010 ASSAY OF HAPTOGLOBIN QUANT: CPT

## 2019-08-03 PROCEDURE — 82977 ASSAY OF GGT: CPT

## 2019-08-03 PROCEDURE — 84460 ALANINE AMINO (ALT) (SGPT): CPT

## 2019-08-03 PROCEDURE — 82172 ASSAY OF APOLIPOPROTEIN: CPT

## 2019-08-03 PROCEDURE — 82247 BILIRUBIN TOTAL: CPT

## 2019-08-03 PROCEDURE — 83883 ASSAY NEPHELOMETRY NOT SPEC: CPT

## 2019-08-03 PROCEDURE — 36415 COLL VENOUS BLD VENIPUNCTURE: CPT

## 2019-08-06 LAB
A2 MACROGLOB SERPL-MCNC: 137 MG/DL (ref 110–276)
ALT SERPL W P-5'-P-CCNC: 33 IU/L (ref 0–55)
APO A-I SERPL-MCNC: 118 MG/DL (ref 101–178)
AST SERPL W P-5'-P-CCNC: 34 IU/L (ref 0–40)
BILIRUB SERPL-MCNC: 0.6 MG/DL (ref 0–1.2)
CHOLEST SERPL-MCNC: 188 MG/DL (ref 100–199)
FIBROSIS SCORING:: ABNORMAL
FIBROSIS STAGE SERPL QL: ABNORMAL
GGT SERPL-CCNC: 103 IU/L (ref 0–65)
GLUCOSE SERPL-MCNC: 96 MG/DL (ref 65–99)
HAPTOGLOB SERPL-MCNC: 47 MG/DL (ref 34–200)
LABORATORY COMMENT REPORT: ABNORMAL
LIVER FIBR SCORE SERPL CALC.FIBROSURE: 0.41 (ref 0–0.21)
NECROINFLAMMATORY ACT GRADE SERPL QL: ABNORMAL
NECROINFLAMMATORY ACT SCORE SERPL: 0.5
SERVICE CMNT-IMP: ABNORMAL
SL AMB INTERPRETATION: ABNORMAL
SL AMB NASH SCORING: ABNORMAL
SL AMB STEATOSIS GRADE: ABNORMAL
SL AMB STEATOSIS SCORE: 0.68 (ref 0–0.3)
STEATOSIS GRADING: ABNORMAL
TRIGL SERPL-MCNC: 119 MG/DL (ref 0–149)

## 2019-08-09 ENCOUNTER — HOSPITAL ENCOUNTER (OUTPATIENT)
Dept: INFUSION CENTER | Facility: CLINIC | Age: 49
Discharge: HOME | End: 2019-08-09
Payer: COMMERCIAL

## 2019-08-09 VITALS
TEMPERATURE: 97.8 F | HEIGHT: 73 IN | SYSTOLIC BLOOD PRESSURE: 108 MMHG | HEART RATE: 68 BPM | WEIGHT: 204.1 LBS | RESPIRATION RATE: 18 BRPM | DIASTOLIC BLOOD PRESSURE: 67 MMHG | BODY MASS INDEX: 27.05 KG/M2

## 2019-08-09 DIAGNOSIS — C82.38 GRADE 3A FOLLICULAR LYMPHOMA OF LYMPH NODES OF MULTIPLE REGIONS (HCC): Primary | ICD-10-CM

## 2019-08-09 PROCEDURE — 96375 TX/PRO/DX INJ NEW DRUG ADDON: CPT

## 2019-08-09 PROCEDURE — 96415 CHEMO IV INFUSION ADDL HR: CPT

## 2019-08-09 PROCEDURE — 96413 CHEMO IV INFUSION 1 HR: CPT

## 2019-08-09 PROCEDURE — 96367 TX/PROPH/DG ADDL SEQ IV INF: CPT

## 2019-08-09 RX ORDER — SODIUM CHLORIDE 9 MG/ML
20 INJECTION, SOLUTION INTRAVENOUS ONCE
Status: COMPLETED | OUTPATIENT
Start: 2019-08-09 | End: 2019-08-09

## 2019-08-09 RX ORDER — ACETAMINOPHEN 325 MG/1
650 TABLET ORAL ONCE
Status: COMPLETED | OUTPATIENT
Start: 2019-08-09 | End: 2019-08-09

## 2019-08-09 RX ADMIN — DIPHENHYDRAMINE HYDROCHLORIDE 25 MG: 50 INJECTION, SOLUTION INTRAMUSCULAR; INTRAVENOUS at 08:52

## 2019-08-09 RX ADMIN — RITUXIMAB 800 MG: 10 INJECTION, SOLUTION INTRAVENOUS at 09:28

## 2019-08-09 RX ADMIN — ACETAMINOPHEN 650 MG: 325 TABLET ORAL at 08:50

## 2019-08-09 RX ADMIN — HYDROCORTISONE SODIUM SUCCINATE 100 MG: 100 INJECTION, POWDER, FOR SOLUTION INTRAMUSCULAR; INTRAVENOUS at 08:50

## 2019-08-09 RX ADMIN — SODIUM CHLORIDE 20 ML/HR: 0.9 INJECTION, SOLUTION INTRAVENOUS at 08:56

## 2019-08-09 NOTE — PROGRESS NOTES
Spoke with lewis via [facility]    NO labs needed for today  Orders will be corrected to reflex this

## 2019-08-09 NOTE — PROGRESS NOTES
Patient states that " I don't need labs drawn,  Jaden Cruz had to many lab drawn and I don't need them"   Attempted to explain to patient that there are labs ordered and parameters on chart  Offered to call Dr Oneil Davis and patient states that he would like me to call Dr Oneil Davis prior to drawing labs

## 2019-08-09 NOTE — PROGRESS NOTES
Spoke with [place] and explained situation  States that's she has to make a phone call and will call me right back

## 2019-10-01 ENCOUNTER — TRANSCRIBE ORDERS (OUTPATIENT)
Dept: ADMINISTRATIVE | Age: 49
End: 2019-10-01

## 2019-10-01 ENCOUNTER — APPOINTMENT (OUTPATIENT)
Dept: LAB | Age: 49
End: 2019-10-01
Payer: COMMERCIAL

## 2019-10-01 DIAGNOSIS — C82.38 GRADE 3A FOLLICULAR LYMPHOMA OF LYMPH NODES OF MULTIPLE REGIONS (HCC): ICD-10-CM

## 2019-10-01 LAB
ALBUMIN SERPL BCP-MCNC: 4.1 G/DL (ref 3.5–5)
ALP SERPL-CCNC: 89 U/L (ref 46–116)
ALT SERPL W P-5'-P-CCNC: 51 U/L (ref 12–78)
ANION GAP SERPL CALCULATED.3IONS-SCNC: 5 MMOL/L (ref 4–13)
AST SERPL W P-5'-P-CCNC: 24 U/L (ref 5–45)
BASOPHILS # BLD AUTO: 0.06 THOUSANDS/ΜL (ref 0–0.1)
BASOPHILS NFR BLD AUTO: 1 % (ref 0–1)
BILIRUB SERPL-MCNC: 0.68 MG/DL (ref 0.2–1)
BUN SERPL-MCNC: 12 MG/DL (ref 5–25)
CALCIUM SERPL-MCNC: 9.2 MG/DL (ref 8.3–10.1)
CHLORIDE SERPL-SCNC: 109 MMOL/L (ref 100–108)
CO2 SERPL-SCNC: 27 MMOL/L (ref 21–32)
CREAT SERPL-MCNC: 0.82 MG/DL (ref 0.6–1.3)
EOSINOPHIL # BLD AUTO: 0.23 THOUSAND/ΜL (ref 0–0.61)
EOSINOPHIL NFR BLD AUTO: 3 % (ref 0–6)
ERYTHROCYTE [DISTWIDTH] IN BLOOD BY AUTOMATED COUNT: 12.9 % (ref 11.6–15.1)
GFR SERPL CREATININE-BSD FRML MDRD: 103 ML/MIN/1.73SQ M
GLUCOSE SERPL-MCNC: 99 MG/DL (ref 65–140)
HCT VFR BLD AUTO: 45 % (ref 36.5–49.3)
HGB BLD-MCNC: 14.9 G/DL (ref 12–17)
IMM GRANULOCYTES # BLD AUTO: 0.02 THOUSAND/UL (ref 0–0.2)
IMM GRANULOCYTES NFR BLD AUTO: 0 % (ref 0–2)
LDH SERPL-CCNC: 137 U/L (ref 81–234)
LYMPHOCYTES # BLD AUTO: 1.27 THOUSANDS/ΜL (ref 0.6–4.47)
LYMPHOCYTES NFR BLD AUTO: 19 % (ref 14–44)
MCH RBC QN AUTO: 28.8 PG (ref 26.8–34.3)
MCHC RBC AUTO-ENTMCNC: 33.1 G/DL (ref 31.4–37.4)
MCV RBC AUTO: 87 FL (ref 82–98)
MONOCYTES # BLD AUTO: 0.63 THOUSAND/ΜL (ref 0.17–1.22)
MONOCYTES NFR BLD AUTO: 9 % (ref 4–12)
NEUTROPHILS # BLD AUTO: 4.64 THOUSANDS/ΜL (ref 1.85–7.62)
NEUTS SEG NFR BLD AUTO: 68 % (ref 43–75)
NRBC BLD AUTO-RTO: 0 /100 WBCS
PLATELET # BLD AUTO: 171 THOUSANDS/UL (ref 149–390)
PMV BLD AUTO: 11.3 FL (ref 8.9–12.7)
POTASSIUM SERPL-SCNC: 4.2 MMOL/L (ref 3.5–5.3)
PROT SERPL-MCNC: 6.6 G/DL (ref 6.4–8.2)
RBC # BLD AUTO: 5.17 MILLION/UL (ref 3.88–5.62)
SODIUM SERPL-SCNC: 141 MMOL/L (ref 136–145)
WBC # BLD AUTO: 6.85 THOUSAND/UL (ref 4.31–10.16)

## 2019-10-01 PROCEDURE — 36415 COLL VENOUS BLD VENIPUNCTURE: CPT

## 2019-10-01 PROCEDURE — 83615 LACTATE (LD) (LDH) ENZYME: CPT

## 2019-10-01 PROCEDURE — 85025 COMPLETE CBC W/AUTO DIFF WBC: CPT

## 2019-10-01 PROCEDURE — 80053 COMPREHEN METABOLIC PANEL: CPT

## 2019-10-04 ENCOUNTER — OFFICE VISIT (OUTPATIENT)
Dept: HEMATOLOGY ONCOLOGY | Facility: CLINIC | Age: 49
End: 2019-10-04
Payer: COMMERCIAL

## 2019-10-04 DIAGNOSIS — C82.38 GRADE 3A FOLLICULAR LYMPHOMA OF LYMPH NODES OF MULTIPLE REGIONS (HCC): Primary | ICD-10-CM

## 2019-10-04 PROCEDURE — 99213 OFFICE O/P EST LOW 20 MIN: CPT | Performed by: INTERNAL MEDICINE

## 2019-10-04 NOTE — PROGRESS NOTES
Hematology Outpatient Follow - Up Note  Aleks Zamora 47 y o  male MRN: @ Encounter: [id number]        Date:  10/4/2019        Assessment/ Plan:    Stage IV grade 3b follicular lymphoma when he presented with weight loss, splenomegaly, multiple lymphadenopathy above and below the diaphragm, bone marrow biopsy showed 10% involvement with follicular lymphoma excisional biopsy of 1 of the retroperitoneal lymph nodes confirm the diagnosis no evidence of transformation, 2nd opinion at [address] [place] [address] agreed on the treatment with R-CHOP    He received 6 cycles of R-CHOP beginning in May 2017 subsequent PET scan showed no evidence of disease physical examination showed disappearance of the splenomegaly    He was treated with maintenance rituximab 375 milligram/meter squared every 2 months, to finish dose 12   In October 2019    PET scan is scheduled to thereafter and he will follow up with [address] [place] [address], LDH is normal    Follow-up in 3 months with CBC, CMP, LDH    Flush the Port-A-Cath every 3 months         HPI:   62-year-old  male with history of abdominal pain, weight loss prior to the presentation in 2016, he was found to have right submandibular lymphadenopathy, needle biopsy was not conclusive, subsequently CT scan showed bilateral axillary lymphadenopathy in May 2017 also splenomegaly, extensive retroperitoneal lymphadenopathy, adenopathy in the esophageal gastric junction, bilateral shotty lymphadenopathy in the both axilla, he had normal CBC except for thrombocytopenia with platelet count of [id number] normal differential, normal IgM, IgG, IgA, LDH was 166, kappa light chain of 78, lambda light chain of 17, he had retroperitoneal lymph node biopsy showed B-cell lymphoma, positive for CD 20, CD 10, negative for CD 23, the biopsy sent for 2nd opinion at [address] [place] [address], showed grade 3 B follicular lymphoma  A bone marrow biopsy showed 10% involvement with lymphoma  He was treated with R-CHOP from June 17 until October 2017 for total of 6 cycles, with PET scan after cycle 3  Showed significant response, repeat PET scan after 6 cycles of chemotherapy showed no evidence of disease  Currently on maintenance rituximab every 2 months initiated in November 2017 for total of 2 years finished in October 2019      Interval History:  No changes      Previous Treatment:         Test Results:    Imaging: No results found  Labs:   Lab Results   Component Value Date    WBC 6 85 10/01/2019    HGB 14 9 10/01/2019    HCT 45 0 10/01/2019    MCV 87 10/01/2019     10/01/2019     Lab Results   Component Value Date     08/16/2017    K 4 2 10/01/2019     (H) 10/01/2019    CO2 27 10/01/2019    BUN 12 10/01/2019    CREATININE 0 82 10/01/2019    GLUF 106 (H) 09/29/2017    CALCIUM 9 2 10/01/2019    AST 24 10/01/2019    ALT 51 10/01/2019    ALKPHOS 89 10/01/2019    PROT 6 3 08/16/2017    BILITOT 0 5 08/16/2017    EGFR 103 10/01/2019       Lab Results   Component Value Date    IRON 103 07/01/2019    FERRITIN 187 07/01/2019     ()    No results found for: [id number]      ROS:   Review of Systems   Constitutional: Negative for activity change, appetite change, chills, diaphoresis, fatigue, fever and unexpected weight change  HENT: Negative for congestion, dental problem, facial swelling, hearing loss, mouth sores, nosebleeds, postnasal drip, rhinorrhea, sore throat, trouble swallowing and voice change  Eyes: Negative for photophobia, pain, discharge, redness, itching and visual disturbance  Respiratory: Negative for cough, choking, chest tightness, shortness of breath and wheezing  Cardiovascular: Negative for chest pain, palpitations and leg swelling  Gastrointestinal: Negative for abdominal distention, abdominal pain, anal bleeding, blood in stool, constipation, diarrhea, nausea, rectal pain and vomiting  Endocrine: Negative for cold intolerance and heat intolerance  Genitourinary: Negative for decreased urine volume, difficulty urinating, dysuria, flank pain, frequency, hematuria and urgency  Musculoskeletal: Negative for arthralgias, back pain, gait problem, joint swelling, myalgias, neck pain and neck stiffness  Skin: Negative for color change, pallor, rash and wound  Allergic/Immunologic: Negative for immunocompromised state  Neurological: Negative for dizziness, tremors, seizures, syncope, facial asymmetry, speech difficulty, weakness, light-headedness, numbness and headaches  Hematological: Negative for adenopathy  Does not bruise/bleed easily  Psychiatric/Behavioral: Negative for agitation, confusion, decreased concentration, dysphoric mood and sleep disturbance  The patient is not nervous/anxious  All other systems reviewed and are negative  Current Medications: Reviewed  Allergies: Reviewed  PMH/FH/SH:  Reviewed      Physical Exam:    There is no height or weight on file to calculate BSA  Wt Readings from Last 3 Encounters:   08/09/19 92 6 kg (204 lb 1 6 oz)   06/14/19 95 5 kg (210 lb 8 oz)   06/07/19 96 2 kg (212 lb)        Temp Readings from Last 3 Encounters:   08/09/19 97 8 °F (36 6 °C)   06/14/19 98 6 °F (37 °C) (Oral)   06/07/19 97 8 °F (36 6 °C) (Tympanic)        BP Readings from Last 3 Encounters:   08/09/19 108/67   06/14/19 112/70   06/07/19 110/68         Pulse Readings from Last 3 Encounters:   08/09/19 68   06/14/19 70   06/07/19 71        Physical Exam   Constitutional: He is oriented to person, place, and time  He appears well-developed and well-nourished  No distress  HENT:   Head: Normocephalic and atraumatic  Eyes: Conjunctivae are normal    Neck: Normal range of motion  Neck supple  No tracheal deviation present  Cardiovascular: Normal rate and regular rhythm  Exam reveals no gallop and no friction rub  No murmur heard  Pulmonary/Chest: Effort normal and breath sounds normal  No respiratory distress   He has no wheezes  He has no rales  He exhibits no tenderness  Abdominal: Soft  He exhibits no distension  There is no tenderness  Lymphadenopathy:     He has no cervical adenopathy  Neurological: He is alert and oriented to person, place, and time  Skin: Skin is warm and dry  He is not diaphoretic  No erythema  No pallor  Psychiatric: He has a normal mood and affect  His behavior is normal  Judgment and thought content normal    Vitals reviewed  ECO    Goals and Barriers:  Current Goal: Minimize effects of disease  Barriers: None  Patient's Capacity to Self Care:  Patient is able to self care      Code Status: [unfilled]

## 2019-10-11 ENCOUNTER — TELEPHONE (OUTPATIENT)
Dept: HEMATOLOGY ONCOLOGY | Facility: CLINIC | Age: 49
End: 2019-10-11

## 2019-10-11 ENCOUNTER — HOSPITAL ENCOUNTER (OUTPATIENT)
Dept: INFUSION CENTER | Facility: CLINIC | Age: 49
Discharge: HOME | End: 2019-10-11
Payer: COMMERCIAL

## 2019-10-11 VITALS
SYSTOLIC BLOOD PRESSURE: 110 MMHG | RESPIRATION RATE: 16 BRPM | OXYGEN SATURATION: 98 % | HEART RATE: 60 BPM | BODY MASS INDEX: 26.64 KG/M2 | HEIGHT: 73 IN | DIASTOLIC BLOOD PRESSURE: 72 MMHG | TEMPERATURE: 97.6 F | WEIGHT: 201 LBS

## 2019-10-11 DIAGNOSIS — C82.38 GRADE 3A FOLLICULAR LYMPHOMA OF LYMPH NODES OF MULTIPLE REGIONS (HCC): Primary | ICD-10-CM

## 2019-10-11 PROCEDURE — 96413 CHEMO IV INFUSION 1 HR: CPT

## 2019-10-11 PROCEDURE — 96367 TX/PROPH/DG ADDL SEQ IV INF: CPT

## 2019-10-11 PROCEDURE — 96415 CHEMO IV INFUSION ADDL HR: CPT

## 2019-10-11 PROCEDURE — 96375 TX/PRO/DX INJ NEW DRUG ADDON: CPT

## 2019-10-11 RX ORDER — SODIUM CHLORIDE 9 MG/ML
20 INJECTION, SOLUTION INTRAVENOUS ONCE
Status: COMPLETED | OUTPATIENT
Start: 2019-10-11 | End: 2019-10-11

## 2019-10-11 RX ORDER — ACETAMINOPHEN 325 MG/1
650 TABLET ORAL ONCE
Status: COMPLETED | OUTPATIENT
Start: 2019-10-11 | End: 2019-10-11

## 2019-10-11 RX ADMIN — ACETAMINOPHEN 650 MG: 325 TABLET ORAL at 08:30

## 2019-10-11 RX ADMIN — SODIUM CHLORIDE 20 ML/HR: 0.9 INJECTION, SOLUTION INTRAVENOUS at 08:08

## 2019-10-11 RX ADMIN — RITUXIMAB 800 MG: 10 INJECTION, SOLUTION INTRAVENOUS at 09:08

## 2019-10-11 RX ADMIN — HYDROCORTISONE SODIUM SUCCINATE 100 MG: 100 INJECTION, POWDER, FOR SOLUTION INTRAMUSCULAR; INTRAVENOUS at 08:30

## 2019-10-11 RX ADMIN — DIPHENHYDRAMINE HYDROCHLORIDE 25 MG: 50 INJECTION, SOLUTION INTRAMUSCULAR; INTRAVENOUS at 08:30

## 2019-10-25 ENCOUNTER — TELEPHONE (OUTPATIENT)
Dept: HEMATOLOGY ONCOLOGY | Facility: CLINIC | Age: 49
End: 2019-10-25

## 2019-10-25 ENCOUNTER — HOSPITAL ENCOUNTER (OUTPATIENT)
Dept: RADIOLOGY | Age: 49
Discharge: HOME | End: 2019-10-25
Payer: COMMERCIAL

## 2019-10-25 DIAGNOSIS — C82.38 FOLLICULAR LYMPHOMA GRADE IIIA OF LYMPH NODES OF MULTIPLE SITES (HCC): ICD-10-CM

## 2019-10-25 LAB — GLUCOSE SERPL-MCNC: 94 MG/DL (ref 65–140)

## 2019-10-25 PROCEDURE — 78815 PET IMAGE W/CT SKULL-THIGH: CPT

## 2019-10-25 PROCEDURE — 82948 REAGENT STRIP/BLOOD GLUCOSE: CPT

## 2019-10-25 PROCEDURE — A9552 F18 FDG: HCPCS

## 2019-10-30 ENCOUNTER — TELEPHONE (OUTPATIENT)
Dept: HEMATOLOGY ONCOLOGY | Facility: CLINIC | Age: 49
End: 2019-10-30

## 2019-11-01 ENCOUNTER — TELEPHONE (OUTPATIENT)
Dept: HEMATOLOGY ONCOLOGY | Facility: CLINIC | Age: 49
End: 2019-11-01

## 2019-11-01 NOTE — TELEPHONE ENCOUNTER
Conversed with the patient about the PET scan finding, right external iliac lymph node score 4    We decided to wait and observe and repeat CT scan or PET scan in 6-8 weeks    Also the patient is scheduled to be seen at [address] [place] [address] for follow-up await for their input    Discussed with the patient about the PET scan finding

## 2020-01-24 LAB
ALBUMIN SERPL-MCNC: 4.3 G/DL (ref 3.6–5.1)
ALBUMIN/GLOB SERPL: 2 (CALC) (ref 1–2.5)
ALP SERPL-CCNC: 58 U/L (ref 40–115)
ALT SERPL-CCNC: 19 U/L (ref 9–46)
AST SERPL-CCNC: 20 U/L (ref 10–35)
BASOPHILS # BLD AUTO: 73 CELLS/UL (ref 0–200)
BASOPHILS NFR BLD AUTO: 1.1 %
BILIRUB SERPL-MCNC: 1.1 MG/DL (ref 0.2–1.2)
BUN SERPL-MCNC: 12 MG/DL (ref 7–25)
BUN/CREAT SERPL: NORMAL (CALC) (ref 6–22)
CALCIUM SERPL-MCNC: 9.5 MG/DL (ref 8.6–10.3)
CHLORIDE SERPL-SCNC: 107 MMOL/L (ref 98–110)
CO2 SERPL-SCNC: 27 MMOL/L (ref 20–32)
CREAT SERPL-MCNC: 0.96 MG/DL (ref 0.7–1.33)
EOSINOPHIL # BLD AUTO: 132 CELLS/UL (ref 15–500)
EOSINOPHIL NFR BLD AUTO: 2 %
ERYTHROCYTE [DISTWIDTH] IN BLOOD BY AUTOMATED COUNT: 13.5 % (ref 11–15)
GLOBULIN SER CALC-MCNC: 2.2 G/DL (CALC) (ref 1.9–3.7)
GLUCOSE SERPL-MCNC: 90 MG/DL (ref 65–99)
HCT VFR BLD AUTO: 44.6 % (ref 38.5–50)
HGB BLD-MCNC: 15.5 G/DL (ref 13.2–17.1)
LDH SERPL-CCNC: 127 U/L (ref 120–250)
LYMPHOCYTES # BLD AUTO: 1313 CELLS/UL (ref 850–3900)
LYMPHOCYTES NFR BLD AUTO: 19.9 %
MCH RBC QN AUTO: 29.5 PG (ref 27–33)
MCHC RBC AUTO-ENTMCNC: 34.8 G/DL (ref 32–36)
MCV RBC AUTO: 84.8 FL (ref 80–100)
MONOCYTES # BLD AUTO: 515 CELLS/UL (ref 200–950)
MONOCYTES NFR BLD AUTO: 7.8 %
NEUTROPHILS # BLD AUTO: 4567 CELLS/UL (ref 1500–7800)
NEUTROPHILS NFR BLD AUTO: 69.2 %
PLATELET # BLD AUTO: 185 THOUSAND/UL (ref 140–400)
PMV BLD REES-ECKER: 11.6 FL (ref 7.5–12.5)
POTASSIUM SERPL-SCNC: 4.3 MMOL/L (ref 3.5–5.3)
PROT SERPL-MCNC: 6.5 G/DL (ref 6.1–8.1)
RBC # BLD AUTO: 5.26 MILLION/UL (ref 4.2–5.8)
SL AMB EGFR AFRICAN AMERICAN: 106 ML/MIN/1.73M2
SL AMB EGFR NON AFRICAN AMERICAN: 92 ML/MIN/1.73M2
SODIUM SERPL-SCNC: 142 MMOL/L (ref 135–146)
WBC # BLD AUTO: 6.6 THOUSAND/UL (ref 3.8–10.8)

## 2020-02-11 ENCOUNTER — OFFICE VISIT (OUTPATIENT)
Dept: HEMATOLOGY ONCOLOGY | Facility: CLINIC | Age: 50
End: 2020-02-11
Payer: COMMERCIAL

## 2020-02-11 ENCOUNTER — HOSPITAL ENCOUNTER (OUTPATIENT)
Dept: INFUSION CENTER | Facility: CLINIC | Age: 50
Discharge: HOME | End: 2020-02-11
Payer: COMMERCIAL

## 2020-02-11 VITALS
DIASTOLIC BLOOD PRESSURE: 90 MMHG | BODY MASS INDEX: 27.34 KG/M2 | SYSTOLIC BLOOD PRESSURE: 124 MMHG | TEMPERATURE: 97.6 F | HEIGHT: 74 IN | HEART RATE: 57 BPM | WEIGHT: 213 LBS | RESPIRATION RATE: 14 BRPM

## 2020-02-11 DIAGNOSIS — C82.38 GRADE 3A FOLLICULAR LYMPHOMA OF LYMPH NODES OF MULTIPLE REGIONS (HCC): Primary | ICD-10-CM

## 2020-02-11 PROCEDURE — 99214 OFFICE O/P EST MOD 30 MIN: CPT | Performed by: INTERNAL MEDICINE

## 2020-02-11 PROCEDURE — 96523 IRRIG DRUG DELIVERY DEVICE: CPT

## 2020-02-11 NOTE — PROGRESS NOTES
Patient is here for a part flush  He offers no complaints at this time  Port flushed per protocol  Patient only gets his port flushed every 3 months and he has another one scheduled   Next appointment confirmed and he declined avs

## 2020-02-11 NOTE — PROGRESS NOTES
Hematology Outpatient Follow - Up Note  Sofi Ellis 45 y o  male MRN: @ Encounter: [id number]        Date:  2/11/2020        Assessment/ Plan:    Stage IV follicular lymphoma grade 3B when he presented with splenomegaly, weight loss, constitutional symptoms, multiple lymphadenopathy above and below the diaphragm, bone marrow biopsy showed 10 percent involvement with follicular lymphoma, excision biopsy of 1 of the retroperitoneal lymph node confirmed the diagnosis    No evidence of transformation, 2nd opinion at [address] agreed on treatment with R-CHOP    He received 6 cycles of R-CHOP spanning from May 2017 until September 2017 subsequent PET scan showed no evidence of disease and physical examination showed disappearance of splenomegaly and lymphadenopathy    Maintenance rituximab 375 milligram/meter squared every 2 months for total of 2 years finished in October 2019    PET scan showed uptake in right inguinal area, he has normal CBC, CMP, LDH    Physical examination showed left inguinal lymph node measuring 1 5 centimeter I could not appreciate any other lymphadenopathy    Evaluated at [address], we decided to repeat PET scan to assess the status of the inguinal area and will proceed from there    Flush the Port-A-Cath every 3 months            HPI:      14-year-old  male with history of abdominal pain, weight loss prior to the presentation in 2016, he was found to have right submandibular lymphadenopathy, needle biopsy was not conclusive, subsequently CT scan showed bilateral axillary lymphadenopathy in May 2017 also splenomegaly, extensive retroperitoneal lymphadenopathy, adenopathy in the esophageal gastric junction, bilateral shotty lymphadenopathy in the both axilla, he had normal CBC except for thrombocytopenia with platelet count of [id number] normal differential, normal IgM, IgG, IgA, LDH was 166, kappa light chain of 78, lambda light chain of 17, he had retroperitoneal lymph node biopsy showed B-cell lymphoma, positive for CD 20, CD 10, negative for CD 23, the biopsy sent for 2nd opinion at [address] [place] [address], showed grade 3 B follicular lymphoma    A bone marrow biopsy showed 10% involvement with lymphoma    He was treated with R-CHOP from June 17 until October 2017 for total of 6 cycles, with PET scan after cycle 3  Showed significant response, repeat PET scan after 6 cycles of chemotherapy showed no evidence of disease    He received maintenance rituximab every 2 months initiated in November 2017 for total of 2 years finished in October 2019     PET scan on 10/25/2019 showed single right external iliac lymph node with avid uptake score of 4, measuring 1 1 x 0 5 centimeter, SUV 4 7 otherwise no lesions are seen in the neck chest or abdomen    We opted for follow-up in 3 months, he has normal CBC, LDH, CMP     Interval History:        Previous Treatment:         Test Results:    Imaging: No results found  Labs:   Lab Results   Component Value Date    WBC 6 6 01/23/2020    HGB 15 5 01/23/2020    HCT 44 6 01/23/2020    MCV 84 8 01/23/2020     01/23/2020     Lab Results   Component Value Date     08/16/2017    K 4 3 01/23/2020     01/23/2020    CO2 27 01/23/2020    BUN 12 01/23/2020    CREATININE 0 96 01/23/2020    GLUF 106 (H) 09/29/2017    CALCIUM 9 5 01/23/2020    AST 20 01/23/2020    ALT 19 01/23/2020    ALKPHOS 58 01/23/2020    PROT 6 3 08/16/2017    BILITOT 0 5 08/16/2017    EGFR 103 10/01/2019       Lab Results   Component Value Date    IRON 103 07/01/2019    FERRITIN 187 07/01/2019       No results found for: [id number]      ROS: Review of Systems   Constitutional: Negative  Negative for appetite change, chills, diaphoresis, fatigue, fever and unexpected weight change  HENT:   Negative for hearing loss, lump/mass, mouth sores, nosebleeds, sore throat, trouble swallowing and voice change  Eyes: Negative    Negative for eye problems and icterus  Respiratory: Negative  Negative for chest tightness, cough, hemoptysis and shortness of breath  Cardiovascular: Negative for chest pain and leg swelling  Gastrointestinal: Negative for abdominal distention, abdominal pain, blood in stool, constipation, diarrhea and nausea  Endocrine: Negative  Genitourinary: Negative for dysuria, frequency, hematuria and pelvic pain  Musculoskeletal: Negative  Negative for arthralgias, back pain, flank pain, gait problem, myalgias and neck stiffness  Skin: Negative for itching and rash  Neurological: Negative for dizziness, gait problem, headaches, light-headedness, numbness and speech difficulty  Hematological: Negative for adenopathy  Does not bruise/bleed easily  Psychiatric/Behavioral: Negative for confusion, decreased concentration, depression and sleep disturbance  The patient is not nervous/anxious  Current Medications: Reviewed  Allergies: Reviewed  PMH/FH/SH:  Reviewed      Physical Exam:    Body surface area is 2 22 meters squared  Wt Readings from Last 3 Encounters:   02/11/20 96 6 kg (213 lb)   10/11/19 91 2 kg (201 lb)   08/09/19 92 6 kg (204 lb 1 6 oz)        Temp Readings from Last 3 Encounters:   02/11/20 97 6 °F (36 4 °C)   10/11/19 97 6 °F (36 4 °C) (Temporal)   08/09/19 97 8 °F (36 6 °C)        BP Readings from Last 3 Encounters:   02/11/20 124/90   10/11/19 110/72   08/09/19 108/67         Pulse Readings from Last 3 Encounters:   02/11/20 57   10/11/19 60   08/09/19 68        Physical Exam   Constitutional: He is oriented to person, place, and time  He appears well-developed and well-nourished  No distress  HENT:   Head: Normocephalic and atraumatic  Eyes: Conjunctivae are normal    Neck: Normal range of motion  Neck supple  No tracheal deviation present  Cardiovascular: Normal rate and regular rhythm  Exam reveals no gallop and no friction rub  No murmur heard    Pulmonary/Chest: Effort normal and breath sounds normal  No respiratory distress  He has no wheezes  He has no rales  He exhibits no tenderness  Abdominal: Soft  He exhibits no distension  There is no tenderness  Lymphadenopathy in the right inguinal area measuring up to 1 5 centimeter, most likely single lymph nodes, I could not appreciate any other enlarged lymph nodes in the femoral, left inguinal area   Lymphadenopathy:     He has no cervical adenopathy  Neurological: He is alert and oriented to person, place, and time  Skin: Skin is warm and dry  He is not diaphoretic  No erythema  No pallor  Psychiatric: He has a normal mood and affect  His behavior is normal  Judgment and thought content normal    Vitals reviewed  ECOG:    Goals and Barriers:  Current Goal: Minimize effects of disease  Barriers: None  Patient's Capacity to Self Care:  Patient is able to self care      Code Status: @[facility]@

## 2020-03-18 ENCOUNTER — HOSPITAL ENCOUNTER (OUTPATIENT)
Dept: RADIOLOGY | Age: 50
Discharge: HOME | End: 2020-03-18
Payer: COMMERCIAL

## 2020-03-18 ENCOUNTER — TELEPHONE (OUTPATIENT)
Dept: HEMATOLOGY ONCOLOGY | Facility: CLINIC | Age: 50
End: 2020-03-18

## 2020-03-18 DIAGNOSIS — C82.38 GRADE 3A FOLLICULAR LYMPHOMA OF LYMPH NODES OF MULTIPLE REGIONS (HCC): ICD-10-CM

## 2020-03-18 LAB — GLUCOSE SERPL-MCNC: 90 MG/DL (ref 65–140)

## 2020-03-18 PROCEDURE — A9552 F18 FDG: HCPCS

## 2020-03-18 PROCEDURE — 82948 REAGENT STRIP/BLOOD GLUCOSE: CPT

## 2020-03-18 PROCEDURE — 78815 PET IMAGE W/CT SKULL-THIGH: CPT

## 2020-03-18 NOTE — TELEPHONE ENCOUNTER
Tasked and IM Daron Laboy RN    3/18/20 PET/CT  IMPRESSION:     1  Several new FDG avid lymph nodes in the mediastinum and right perihilar region for which disease recurrence should be considered  Deauville score of 3 to 4  Continued follow up is advised  2   Previously noted FDG avid right external iliac chain lymph node is no longer seen  3   Stable lucent lesion at the right proximal femur with mild FDG uptake  Again, a benign osseous lesion is favored rather than lymphoma      The study was marked in EPIC for significant notification

## 2020-03-18 NOTE — TELEPHONE ENCOUNTER
Karina Banuelos from radiology called with Significant Findings on the NM PET CT skull base to mid thigh ordered by Dr Beth Warren, report available via epic

## 2020-03-26 ENCOUNTER — TELEPHONE (OUTPATIENT)
Dept: HEMATOLOGY ONCOLOGY | Facility: CLINIC | Age: 50
End: 2020-03-26

## 2020-03-26 NOTE — TELEPHONE ENCOUNTER
Called patient to verify screening questions and patient says he has no symptoms  However he is requesting his appt be postponed due to the COVID-19  Stated I would check with Salud Hsieh and Dr Valeria Velasquez and give silvio call back

## 2020-03-27 ENCOUNTER — TELEPHONE (OUTPATIENT)
Dept: HEMATOLOGY ONCOLOGY | Facility: CLINIC | Age: 50
End: 2020-03-27

## 2020-03-27 ENCOUNTER — TELEMEDICINE (OUTPATIENT)
Dept: HEMATOLOGY ONCOLOGY | Facility: CLINIC | Age: 50
End: 2020-03-27
Payer: COMMERCIAL

## 2020-03-27 DIAGNOSIS — C82.38 GRADE 3A FOLLICULAR LYMPHOMA OF LYMPH NODES OF MULTIPLE REGIONS (HCC): Primary | ICD-10-CM

## 2020-03-27 PROCEDURE — G2012 BRIEF CHECK IN BY MD/QHP: HCPCS | Performed by: INTERNAL MEDICINE

## 2020-03-27 NOTE — TELEPHONE ENCOUNTER
Left message for patient that I mailed out his labs and that his appoinment was already made for June 24 at 8:00am with Dr Keith Farr

## 2020-03-27 NOTE — PROGRESS NOTES
Virtual Regular Visit    Problem List Items Addressed This Visit     None               Reason for visit is follicular lymphoma  Encounter provider Modesto Andersen MD    Provider located at [address] [address] [phone]      Recent Visits  No visits were found meeting these conditions  Showing recent visits within past 7 days and meeting all other requirements     Today's Visits  Date Type Provider Dept   03/27/20 Telemedicine Ronnie Townsend  [facility] today's visits and meeting all other requirements     Future Appointments  Date Type Provider Dept   03/27/20 Telemedicine Modesto Andersen MD [facility]   Showing future appointments within next 150 days and meeting all other requirements        After connecting through Good[employer], the patient was identified by name and date of birth  Adrianne King was informed that this is a telemedicine visit and that the visit is being conducted through telephone which may not be secure and therefore, might not be HIPAA-compliant  My office door was closed  No one else was in the room  He acknowledged consent and understanding of privacy and security of the video platform  The patient has agreed to participate and understands they can discontinue the visit at any time  Subjective  Adrianne King is a 45 y o  male with follicular lymphoma      Stage IV follicular lymphoma grade 3B when he presented with splenomegaly, weight loss, constitutional symptoms, multiple lymphadenopathy above and below the diaphragm, bone marrow biopsy showed 10 percent involvement with follicular lymphoma, excision biopsy of 1 of the retroperitoneal lymph node confirmed the diagnosis     No evidence of transformation, 2nd opinion at [address] [facility] agreed on treatment with R-CHOP     He received 6 cycles of R-CHOP spanning from May 2017 until September 2017 subsequent PET scan showed no evidence of disease and physical examination showed disappearance of splenomegaly and lymphadenopathy     Maintenance rituximab 375 milligram/meter squared every 2 months for total of 2 years finished in October 2019    PET scan in January 2020 showed uptake in the right inguinal area however patient did not have any constitutional symptoms, he has normal CBC, CMP, LDH we decided to watch and observe, at that time physical examination showed 1 5 cm left inguinal lymph node    Repeat PET scan in March 2020 showed uptake with SUV between 3 and 5 in the mediastinum, right perihilar area, no evidence of uptake in the abdomen or pelvis specially no uptake in the left inguinal area    He denies any diplopia dyspnea cough hemoptysis dysuria hematuria melena hematochezia weight changes night sweats low-grade fever  Past Medical History:   Diagnosis Date    Lymph nodes enlarged     CT bx-retropertineum today 5/18/2017    Lymphoma ([address] )     follicular    Spleen enlarged     CT bx today retropertineum-5/18/2017       Past Surgical History:   Procedure Laterality Date    COLONOSCOPY      DENTAL SURGERY      NASAL SEPTUM SURGERY         No current outpatient medications on file  No current facility-administered medications for this visit  No Known Allergies    Review of Systems  I reviewed the PET scan, the lab work with the patient, she this might be recurrence of follicular lymphoma or reactive at this time I believe watchful observation especially that he does not have constitutional symptoms and the blood work is normal, and repeat CBC, CMP, LDH in 3 months, LDH is going up or he has any constitutional symptoms will order PET scan otherwise PET scan in 6 months    He expressed understanding and agreed on the plan  Flush the Port-A-Cath every 3 months    I spent 15 minutes with the patient during this visit

## 2020-05-05 ENCOUNTER — HOSPITAL ENCOUNTER (OUTPATIENT)
Dept: INFUSION CENTER | Facility: CLINIC | Age: 50
Discharge: HOME | End: 2020-05-05
Payer: COMMERCIAL

## 2020-05-05 VITALS — TEMPERATURE: 98.7 F

## 2020-05-05 DIAGNOSIS — C82.38 GRADE 3A FOLLICULAR LYMPHOMA OF LYMPH NODES OF MULTIPLE REGIONS (HCC): Primary | ICD-10-CM

## 2020-05-05 PROCEDURE — 96523 IRRIG DRUG DELIVERY DEVICE: CPT

## 2020-06-20 ENCOUNTER — TELEPHONE (OUTPATIENT)
Dept: INFUSION CENTER | Facility: CLINIC | Age: 50
End: 2020-06-20

## 2020-06-22 ENCOUNTER — TELEPHONE (OUTPATIENT)
Dept: HEMATOLOGY ONCOLOGY | Facility: CLINIC | Age: 50
End: 2020-06-22

## 2020-06-23 ENCOUNTER — HOSPITAL ENCOUNTER (OUTPATIENT)
Dept: INFUSION CENTER | Facility: CLINIC | Age: 50
Discharge: HOME | End: 2020-06-23
Payer: COMMERCIAL

## 2020-06-23 VITALS — TEMPERATURE: 97.1 F

## 2020-06-23 DIAGNOSIS — C82.38 GRADE 3A FOLLICULAR LYMPHOMA OF LYMPH NODES OF MULTIPLE REGIONS (HCC): Primary | ICD-10-CM

## 2020-06-23 LAB
ALBUMIN SERPL BCP-MCNC: 3.9 G/DL (ref 3.5–5)
ALP SERPL-CCNC: 76 U/L (ref 46–116)
ALT SERPL W P-5'-P-CCNC: 32 U/L (ref 12–78)
ANION GAP SERPL CALCULATED.3IONS-SCNC: 8 MMOL/L (ref 4–13)
AST SERPL W P-5'-P-CCNC: 20 U/L (ref 5–45)
BASOPHILS # BLD AUTO: 0.06 THOUSANDS/ΜL (ref 0–0.1)
BASOPHILS NFR BLD AUTO: 1 % (ref 0–1)
BILIRUB SERPL-MCNC: 0.55 MG/DL (ref 0.2–1)
BUN SERPL-MCNC: 11 MG/DL (ref 5–25)
CALCIUM SERPL-MCNC: 8.8 MG/DL (ref 8.3–10.1)
CHLORIDE SERPL-SCNC: 107 MMOL/L (ref 100–108)
CO2 SERPL-SCNC: 27 MMOL/L (ref 21–32)
CREAT SERPL-MCNC: 0.86 MG/DL (ref 0.6–1.3)
EOSINOPHIL # BLD AUTO: 0.22 THOUSAND/ΜL (ref 0–0.61)
EOSINOPHIL NFR BLD AUTO: 3 % (ref 0–6)
ERYTHROCYTE [DISTWIDTH] IN BLOOD BY AUTOMATED COUNT: 13.1 % (ref 11.6–15.1)
GFR SERPL CREATININE-BSD FRML MDRD: 100 ML/MIN/1.73SQ M
GLUCOSE SERPL-MCNC: 96 MG/DL (ref 65–140)
HCT VFR BLD AUTO: 45.4 % (ref 36.5–49.3)
HGB BLD-MCNC: 15.5 G/DL (ref 12–17)
IMM GRANULOCYTES # BLD AUTO: 0.04 THOUSAND/UL (ref 0–0.2)
IMM GRANULOCYTES NFR BLD AUTO: 1 % (ref 0–2)
LDH SERPL-CCNC: 154 U/L (ref 81–234)
LYMPHOCYTES # BLD AUTO: 1.31 THOUSANDS/ΜL (ref 0.6–4.47)
LYMPHOCYTES NFR BLD AUTO: 16 % (ref 14–44)
MCH RBC QN AUTO: 29.8 PG (ref 26.8–34.3)
MCHC RBC AUTO-ENTMCNC: 34.1 G/DL (ref 31.4–37.4)
MCV RBC AUTO: 87 FL (ref 82–98)
MONOCYTES # BLD AUTO: 0.63 THOUSAND/ΜL (ref 0.17–1.22)
MONOCYTES NFR BLD AUTO: 8 % (ref 4–12)
NEUTROPHILS # BLD AUTO: 5.88 THOUSANDS/ΜL (ref 1.85–7.62)
NEUTS SEG NFR BLD AUTO: 71 % (ref 43–75)
NRBC BLD AUTO-RTO: 0 /100 WBCS
PLATELET # BLD AUTO: 169 THOUSANDS/UL (ref 149–390)
PMV BLD AUTO: 10.9 FL (ref 8.9–12.7)
POTASSIUM SERPL-SCNC: 4.2 MMOL/L (ref 3.5–5.3)
PROT SERPL-MCNC: 6.6 G/DL (ref 6.4–8.2)
RBC # BLD AUTO: 5.21 MILLION/UL (ref 3.88–5.62)
SODIUM SERPL-SCNC: 142 MMOL/L (ref 136–145)
WBC # BLD AUTO: 8.14 THOUSAND/UL (ref 4.31–10.16)

## 2020-06-23 PROCEDURE — 80053 COMPREHEN METABOLIC PANEL: CPT

## 2020-06-23 PROCEDURE — 83615 LACTATE (LD) (LDH) ENZYME: CPT

## 2020-06-23 PROCEDURE — 85025 COMPLETE CBC W/AUTO DIFF WBC: CPT

## 2020-06-24 ENCOUNTER — OFFICE VISIT (OUTPATIENT)
Dept: HEMATOLOGY ONCOLOGY | Facility: CLINIC | Age: 50
End: 2020-06-24
Payer: COMMERCIAL

## 2020-06-24 VITALS
WEIGHT: 204.7 LBS | OXYGEN SATURATION: 99 % | HEIGHT: 73 IN | BODY MASS INDEX: 27.13 KG/M2 | DIASTOLIC BLOOD PRESSURE: 68 MMHG | TEMPERATURE: 97.5 F | SYSTOLIC BLOOD PRESSURE: 104 MMHG | RESPIRATION RATE: 18 BRPM | HEART RATE: 78 BPM

## 2020-06-24 DIAGNOSIS — C82.38 GRADE 3A FOLLICULAR LYMPHOMA OF LYMPH NODES OF MULTIPLE REGIONS (HCC): Primary | ICD-10-CM

## 2020-06-24 PROCEDURE — 99214 OFFICE O/P EST MOD 30 MIN: CPT | Performed by: INTERNAL MEDICINE

## 2020-09-03 ENCOUNTER — HOSPITAL ENCOUNTER (OUTPATIENT)
Dept: RADIOLOGY | Age: 50
Discharge: HOME | End: 2020-09-03
Payer: COMMERCIAL

## 2020-09-03 ENCOUNTER — TELEPHONE (OUTPATIENT)
Dept: HEMATOLOGY ONCOLOGY | Facility: CLINIC | Age: 50
End: 2020-09-03

## 2020-09-03 DIAGNOSIS — C82.38 GRADE 3A FOLLICULAR LYMPHOMA OF LYMPH NODES OF MULTIPLE REGIONS (HCC): ICD-10-CM

## 2020-09-03 LAB — GLUCOSE SERPL-MCNC: 87 MG/DL (ref 65–140)

## 2020-09-03 PROCEDURE — 82948 REAGENT STRIP/BLOOD GLUCOSE: CPT

## 2020-09-03 PROCEDURE — A9552 F18 FDG: HCPCS

## 2020-09-03 PROCEDURE — 78815 PET IMAGE W/CT SKULL-THIGH: CPT

## 2020-09-03 NOTE — TELEPHONE ENCOUNTER
Significant Findings     Call Received From  Fernando Garcia    Radiology Department Location  [facility]  Pet Scan    Date of Study  09/03   Relevant Information

## 2020-09-03 NOTE — TELEPHONE ENCOUNTER
IMPRESSION:  1  Innumerable new hypermetabolic nodes throughout the neck, chest, abdomen and pelvis, compatible with disease recurrence    Deauville score of 5   2   There is also splenic involvement with mildly increased splenic size and FDG uptake      The study was marked in EPIC for significant notification         Workstation performed: [id number]    Will forward to RN with Dr Queenie Faust for review

## 2020-09-04 ENCOUNTER — TELEPHONE (OUTPATIENT)
Dept: SURGICAL ONCOLOGY | Facility: CLINIC | Age: 50
End: 2020-09-04

## 2020-09-04 ENCOUNTER — TELEPHONE (OUTPATIENT)
Dept: HEMATOLOGY ONCOLOGY | Facility: CLINIC | Age: 50
End: 2020-09-04

## 2020-09-04 DIAGNOSIS — C82.38 GRADE 3A FOLLICULAR LYMPHOMA OF LYMPH NODES OF MULTIPLE REGIONS (HCC): Primary | ICD-10-CM

## 2020-09-04 NOTE — TELEPHONE ENCOUNTER
Dr Shady Arguello is ref pt to Surg Onc for excisional biopsy in regards to recent PET scan findings  Reaching out to [facility] for an appointment  Will contact patient

## 2020-09-04 NOTE — TELEPHONE ENCOUNTER
Please set patient up with surgical oncology asap for a consult for an excisional biopsy based pn recent PET scan findings  Patient aware per his conversation with Dr Daryl Christensen

## 2020-09-10 PROBLEM — R59.1 LYMPHADENOPATHY: Status: ACTIVE | Noted: 2020-09-10

## 2020-09-11 ENCOUNTER — TELEPHONE (OUTPATIENT)
Dept: SURGICAL ONCOLOGY | Facility: CLINIC | Age: 50
End: 2020-09-11

## 2020-09-14 ENCOUNTER — CONSULT (OUTPATIENT)
Dept: SURGICAL ONCOLOGY | Facility: CLINIC | Age: 50
End: 2020-09-14
Payer: COMMERCIAL

## 2020-09-14 ENCOUNTER — APPOINTMENT (OUTPATIENT)
Dept: LAB | Facility: HOSPITAL | Age: 50
End: 2020-09-14
Attending: INTERNAL MEDICINE
Payer: COMMERCIAL

## 2020-09-14 ENCOUNTER — OFFICE VISIT (OUTPATIENT)
Dept: LAB | Facility: HOSPITAL | Age: 50
End: 2020-09-14
Attending: SURGERY
Payer: COMMERCIAL

## 2020-09-14 ENCOUNTER — TRANSCRIBE ORDERS (OUTPATIENT)
Dept: LAB | Facility: HOSPITAL | Age: 50
End: 2020-09-14

## 2020-09-14 VITALS
HEART RATE: 70 BPM | WEIGHT: 203 LBS | SYSTOLIC BLOOD PRESSURE: 130 MMHG | RESPIRATION RATE: 16 BRPM | BODY MASS INDEX: 26.9 KG/M2 | DIASTOLIC BLOOD PRESSURE: 80 MMHG | HEIGHT: 73 IN | TEMPERATURE: 97.5 F

## 2020-09-14 DIAGNOSIS — R59.1 LYMPHADENOPATHY: ICD-10-CM

## 2020-09-14 DIAGNOSIS — C82.38 GRADE 3A FOLLICULAR LYMPHOMA OF LYMPH NODES OF MULTIPLE REGIONS (HCC): ICD-10-CM

## 2020-09-14 DIAGNOSIS — R59.1 LYMPHADENOPATHY: Primary | ICD-10-CM

## 2020-09-14 LAB
ALBUMIN SERPL BCP-MCNC: 3.7 G/DL (ref 3.5–5)
ALP SERPL-CCNC: 83 U/L (ref 46–116)
ALT SERPL W P-5'-P-CCNC: 35 U/L (ref 12–78)
ANION GAP SERPL CALCULATED.3IONS-SCNC: 6 MMOL/L (ref 4–13)
AST SERPL W P-5'-P-CCNC: 28 U/L (ref 5–45)
ATRIAL RATE: 69 BPM
BASOPHILS # BLD AUTO: 0.08 THOUSANDS/ΜL (ref 0–0.1)
BASOPHILS NFR BLD AUTO: 1 % (ref 0–1)
BILIRUB SERPL-MCNC: 0.72 MG/DL (ref 0.2–1)
BUN SERPL-MCNC: 10 MG/DL (ref 5–25)
CALCIUM SERPL-MCNC: 9 MG/DL (ref 8.3–10.1)
CHLORIDE SERPL-SCNC: 109 MMOL/L (ref 100–108)
CO2 SERPL-SCNC: 27 MMOL/L (ref 21–32)
CREAT SERPL-MCNC: 0.77 MG/DL (ref 0.6–1.3)
EOSINOPHIL # BLD AUTO: 0.22 THOUSAND/ΜL (ref 0–0.61)
EOSINOPHIL NFR BLD AUTO: 3 % (ref 0–6)
ERYTHROCYTE [DISTWIDTH] IN BLOOD BY AUTOMATED COUNT: 13.2 % (ref 11.6–15.1)
GFR SERPL CREATININE-BSD FRML MDRD: 105 ML/MIN/1.73SQ M
GLUCOSE SERPL-MCNC: 90 MG/DL (ref 65–140)
HCT VFR BLD AUTO: 45.6 % (ref 36.5–49.3)
HGB BLD-MCNC: 15.4 G/DL (ref 12–17)
IMM GRANULOCYTES # BLD AUTO: 0.04 THOUSAND/UL (ref 0–0.2)
IMM GRANULOCYTES NFR BLD AUTO: 1 % (ref 0–2)
LDH SERPL-CCNC: 175 U/L (ref 81–234)
LYMPHOCYTES # BLD AUTO: 1.95 THOUSANDS/ΜL (ref 0.6–4.47)
LYMPHOCYTES NFR BLD AUTO: 24 % (ref 14–44)
MCH RBC QN AUTO: 29.3 PG (ref 26.8–34.3)
MCHC RBC AUTO-ENTMCNC: 33.8 G/DL (ref 31.4–37.4)
MCV RBC AUTO: 87 FL (ref 82–98)
MONOCYTES # BLD AUTO: 0.55 THOUSAND/ΜL (ref 0.17–1.22)
MONOCYTES NFR BLD AUTO: 7 % (ref 4–12)
NEUTROPHILS # BLD AUTO: 5.26 THOUSANDS/ΜL (ref 1.85–7.62)
NEUTS SEG NFR BLD AUTO: 64 % (ref 43–75)
NRBC BLD AUTO-RTO: 0 /100 WBCS
P AXIS: 76 DEGREES
PLATELET # BLD AUTO: 154 THOUSANDS/UL (ref 149–390)
PMV BLD AUTO: 11.2 FL (ref 8.9–12.7)
POTASSIUM SERPL-SCNC: 4.2 MMOL/L (ref 3.5–5.3)
PR INTERVAL: 162 MS
PROT SERPL-MCNC: 6.7 G/DL (ref 6.4–8.2)
QRS AXIS: 17 DEGREES
QRSD INTERVAL: 86 MS
QT INTERVAL: 368 MS
QTC INTERVAL: 394 MS
RBC # BLD AUTO: 5.25 MILLION/UL (ref 3.88–5.62)
SODIUM SERPL-SCNC: 142 MMOL/L (ref 136–145)
T WAVE AXIS: 44 DEGREES
VENTRICULAR RATE: 69 BPM
WBC # BLD AUTO: 8.1 THOUSAND/UL (ref 4.31–10.16)

## 2020-09-14 PROCEDURE — 83615 LACTATE (LD) (LDH) ENZYME: CPT

## 2020-09-14 PROCEDURE — 93010 ELECTROCARDIOGRAM REPORT: CPT | Performed by: INTERNAL MEDICINE

## 2020-09-14 PROCEDURE — 85025 COMPLETE CBC W/AUTO DIFF WBC: CPT

## 2020-09-14 PROCEDURE — 99203 OFFICE O/P NEW LOW 30 MIN: CPT | Performed by: SURGERY

## 2020-09-14 PROCEDURE — 80053 COMPREHEN METABOLIC PANEL: CPT

## 2020-09-14 PROCEDURE — 36415 COLL VENOUS BLD VENIPUNCTURE: CPT

## 2020-09-14 PROCEDURE — 93005 ELECTROCARDIOGRAM TRACING: CPT

## 2020-09-14 RX ORDER — TRAMADOL HYDROCHLORIDE 50 MG/1
50 TABLET ORAL EVERY 6 HOURS PRN
Qty: 10 TABLET | Refills: 0 | Status: SHIPPED | OUTPATIENT
Start: 2020-09-14 | End: 2021-04-23 | Stop reason: ALTCHOICE

## 2020-09-14 NOTE — PROGRESS NOTES
Surgical Oncology Follow Up       [address] [facility] [place]  [address] [facility] [id number] [phone]    Elías Luther  1970  [id number]  [address] [facility] Monica Mari  [address] [facility] [id number] [phone]    Chief Complaint   Patient presents with    Consult     Lymphadenopathy       Assessment/Plan:    No problem-specific Assessment & Plan notes found for this encounter  Diagnoses and all orders for this visit:    Lymphadenopathy    Grade 3a follicular lymphoma of lymph nodes of multiple regions [facility])  -     Ambulatory referral to Surgical Oncology        Advance Care Planning/Advance Directives:  Discussed disease status, cancer treatment plans and/or cancer treatment goals with the patient  Oncology History   Grade 3a follicular lymphoma of lymph nodes of multiple regions ([facility] [address] )   4/29/2019 Initial Diagnosis    Grade 3a follicular lymphoma of lymph nodes of multiple regions ([facility] [address] )     6/14/2019 -  Chemotherapy    riTUXimab (RITUXAN) 800 mg in sodium chloride 0 9 % 320 mL subsequent titrated chemo infusion, 832 6 mg, Intravenous, Once, 2 of 2 cycles  Administration: 800 mg (8/9/2019), 800 mg (10/11/2019)  riTUXimab (RITUXAN) 800 mg in sodium chloride 0 9 % 320 mL first titrated chemo infusion, 832 6 mg, Intravenous, Once, 1 of 1 cycle  Administration: 800 mg (6/14/2019)         History of Present Illness:  Patient is a 79-year-old man with history of follicular lymphoma, treated in 2017  There is some concern about recurrence based on recent PET scan  He has been referred for lymph node biopsy to evaluate this possibility  He is otherwise doing well  He has noticed a swelling in his neck on the right side himself, but otherwise has no fevers, chills, night sweats, or unintended weight loss      Review of Systems   Constitutional: Negative for activity change, appetite change, fever and unexpected weight change  HENT: Negative  Eyes: Negative  Respiratory: Negative  Negative for shortness of breath  Cardiovascular: Negative  Gastrointestinal: Negative  Endocrine: Negative  Genitourinary: Negative  Musculoskeletal: Negative  Skin: Negative  Allergic/Immunologic: Negative  Neurological: Negative  Hematological: Positive for adenopathy  Psychiatric/Behavioral: Negative  Patient Active Problem List   Diagnosis    Grade 3a follicular lymphoma of lymph nodes of multiple regions ([facility] )    Lymphadenopathy     Past Medical History:   Diagnosis Date    Lymph nodes enlarged     CT bx-retropertineum today 5/18/2017    Lymphoma ([facility] )     follicular    Spleen enlarged     CT bx today retropertineum-5/18/2017     Past Surgical History:   Procedure Laterality Date    COLONOSCOPY      DENTAL SURGERY      NASAL SEPTUM SURGERY       No family history on file  Social History     Socioeconomic History    Marital status: /Civil Union     Spouse name: Not on file    Number of children: Not on file    Years of education: Not on file    Highest education level: Not on file   Occupational History    Not on file   Social Needs    Financial resource strain: Not on file    Food insecurity     Worry: Not on file     Inability: Not on file   [employer] needs     Medical: Not on file     Non-medical: Not on file   Tobacco Use    Smoking status: Never Smoker    Smokeless tobacco: Never Used   Substance and Sexual Activity    Alcohol use:  Yes     Alcohol/week: 2 0 standard drinks     Types: 2 Glasses of wine per week     Comment: monthly    Drug use: No    Sexual activity: Not on file   Lifestyle    Physical activity     Days per week: Not on file     Minutes per session: Not on file    Stress: Not on file   Relationships    Social connections     Talks on phone: Not on file     Gets together: Not on file Attends Sikh service: Not on file     Active member of club or organization: Not on file     Attends meetings of clubs or organizations: Not on file     Relationship status: Not on file    Intimate partner violence     Fear of current or ex partner: Not on file     Emotionally abused: Not on file     Physically abused: Not on file     Forced sexual activity: Not on file   Other Topics Concern    Not on file   Social History Narrative    Not on file     No current outpatient medications on file  No Known Allergies  Vitals:    09/14/20 0937   BP: 130/80   Pulse: 70   Resp: 16   Temp: 97 5 °F (36 4 °C)       Physical Exam  Constitutional:       Appearance: Normal appearance  HENT:      Nose: Nose normal    Eyes:      General: No scleral icterus  Extraocular Movements: Extraocular movements intact  Pupils: Pupils are equal, round, and reactive to light  Neck:      Musculoskeletal: Normal range of motion and neck supple  No neck rigidity or muscular tenderness  Cardiovascular:      Rate and Rhythm: Normal rate and regular rhythm  Heart sounds: Normal heart sounds  Pulmonary:      Effort: Pulmonary effort is normal  No respiratory distress  Breath sounds: Normal breath sounds  No stridor  Abdominal:      General: Abdomen is flat  Comments: Bilateral inguinal adenopathy palpable   Musculoskeletal: Normal range of motion  Lymphadenopathy:      Cervical: Cervical adenopathy present  Skin:     General: Skin is warm and dry  Neurological:      General: No focal deficit present  Mental Status: He is alert and oriented to person, place, and time  Psychiatric:         Mood and Affect: Mood normal          Behavior: Behavior normal          Thought Content:  Thought content normal          Judgment: Judgment normal          Pathology:  none    Labs:  Lab Results   Component Value Date     08/16/2017    SODIUM 142 06/23/2020    K 4 2 06/23/2020     06/23/2020 CO2 27 06/23/2020    AGAP 8 06/23/2020    BUN 11 06/23/2020    CREATININE 0 86 06/23/2020    GLUC 96 06/23/2020    GLUF 106 (H) 09/29/2017    CALCIUM 8 8 06/23/2020    AST 20 06/23/2020    ALT 32 06/23/2020    ALKPHOS 76 06/23/2020    PROT 6 3 08/16/2017    TP 6 6 06/23/2020    BILITOT 0 5 08/16/2017    TBILI 0 55 06/23/2020    EGFR 100 06/23/2020         Imaging  Nm Pet Ct Skull Base To Mid Thigh    Result Date: 9/3/2020  Narrative: PET/CT SCAN INDICATION:  C82 38: Follicular lymphoma grade iiia, lymph nodes of multiple sites   , restaging for treatment management MODIFIER: PS COMPARISON: PET CT 3/18/2020 and priors CELL TYPE:  Follicular lymphoma, retroperitoneal node biopsy 5/18/2017 TECHNIQUE:   8 3 mCi F-18-FDG administered IV  Multiplanar attenuation corrected and non attenuation corrected PET images were acquired 60 minutes post injection  Contiguous, low dose, axial CT sections were obtained from the skull base through the femurs   Intravenous contrast material was not utilized  This examination, like all CT scans performed in the [facility], was performed utilizing techniques to minimize radiation dose exposure, including the use of iterative reconstruction and automated exposure control  Fasting serum glucose: 87 mg/dl FINDINGS: For reference: Mediastinal blood pool SUV 2 6  Liver SUV 3 1  VISUALIZED BRAIN:   No acute abnormalities are seen  Unchanged prominent CSF space in the posterior fossa  HEAD/NECK:   There are numerous new hypermetabolic bilateral cervical nodes extending from the level of the parotid glands to the supraclavicular regions, compatible with disease recurrence  Example node along the posterior right parotid series 3 image 48 measuring 9 x 6 mm, SUV 6 5  Node anterior to the right submandibular gland image 59 measures 1 3 x 0 8 cm, SUV 7 3  Left supraclavicular node image 81 measures 10 x 9 mm, SUV 3 6   CT images: Stable partial opacification of the right maxillary sinus  CHEST:   Numerous new hypermetabolic bilateral axillary, hilar and mediastinal nodes, compatible with disease recurrence  Example left axillary node image 99 measures 1 4 x 0 7 cm, SUV 5 6  Right axillary node image 94 measures 1 9 x 1 4 cm, SUV 10 1  Right paratracheal node image 102 measures 1 5 x 0 8 cm, SUV 5 5  Cluster of subcarinal nodes have an SUV of 6 9  Right hilar SUV 4 9  Left hilar SUV 3  Hypermetabolic node between the distal esophagus and distal descending thoracic aorta image 123 measures 2 2 x 1 3 cm, SUV 8  Small hypermetabolic foci also noted anterior to the diaphragm  CT images: Stable 4 mm right lower lung nodule image 117  ABDOMEN/PELVIS:   Innumerable new hypermetabolic retroperitoneal, mesenteric, retrocrural, and periportal nodes extending into the pelvis along the bilateral iliac vessels and pelvic sidewalls, as well as bilateral inguinal regions  Findings are compatible with disease recurrence  Example periportal node image 157 measures 1 8 x 2 1 cm, SUV 11 5  Prior measurement 8 x 10 mm, SUV 1 5  Portal caval node image 163 measures 4 5 x 1 3 cm, SUV 9 5  Prior measurement 2 6 x 0 5 cm, SUV 1 4  Cluster of small aortocaval nodes have an SUV of 7 9  Right common iliac node image 216 measures 1 5 x 0 8 cm, SUV 10  More distally, right common iliac node image 228 measures 1 5 x 1 6 cm, SUV 13 2  Right external iliac node image 256 measures 2 1 x 1 cm, SUV 11 4  Left external iliac node image 252 measures 2 7 x 1 5 cm, SUV 17 4  Left external iliac node image 263 measures 2 x 3 6 cm, SUV 15 3  At the same level, right external iliac node image 264 measures 1 8 x 2 4 cm, SUV 13 5  Left inguinal node image 273 measures 1 5 x 2 5 cm, SUV 16 4  There is also mildly increased splenomegaly with increased FDG activity, measuring 11 5 cm transverse x 7 5 cm AP x 15 cm craniocaudad dimension, SUV 4 4  On the prior exam, the spleen measured 10 6 x 6 3 x 14 6 cm, SUV 2 5    CT images: Otherwise stable  OSSEOUS STRUCTURES: Stable proximal right femoral lucency with mild FDG activity, SUV 3 2, prior SUV 3 6  No new FDG avid lesions are seen  CT images: No significant findings  Impression: 1  Innumerable new hypermetabolic nodes throughout the neck, chest, abdomen and pelvis, compatible with disease recurrence  Deauville score of 5  2   There is also splenic involvement with mildly increased splenic size and FDG uptake  The study was marked in EPIC for significant notification  Workstation performed: [id number]     I reviewed the above laboratory and imaging data  Discussion/Summary:  41-year-old man, history of lymphoma, now with new nodes in neck, chest, abdomen, and groin suspicious for recurrence  Will set up for lymph node biopsy for flow cytometry  Most prominent nodes appear to be in the bilateral groins  Will set up for right versus left excisional biopsy for flow cytometry  Rationale for this along with risks and benefits of surgery including infection, bleeding, wound problems, seroma, discussed the patient  He understands the plan wishes to proceed as outlined

## 2020-09-14 NOTE — H&P (VIEW-ONLY)
Surgical Oncology Follow Up       [id number] [facility] [place]  [address] [facility] [id number] [phone]    Omer Ruano  1970  [id number]  [address] [facility] Minashley Rios  [address] [facility] [id number] [phone]    Chief Complaint   Patient presents with    Consult     Lymphadenopathy       Assessment/Plan:    No problem-specific Assessment & Plan notes found for this encounter  Diagnoses and all orders for this visit:    Lymphadenopathy    Grade 3a follicular lymphoma of lymph nodes of multiple regions [facility])  -     Ambulatory referral to Surgical Oncology        Advance Care Planning/Advance Directives:  Discussed disease status, cancer treatment plans and/or cancer treatment goals with the patient  Oncology History   Grade 3a follicular lymphoma of lymph nodes of multiple regions ([facility] [address] )   4/29/2019 Initial Diagnosis    Grade 3a follicular lymphoma of lymph nodes of multiple regions ([facility] [address] )     6/14/2019 -  Chemotherapy    riTUXimab (RITUXAN) 800 mg in sodium chloride 0 9 % 320 mL subsequent titrated chemo infusion, 832 6 mg, Intravenous, Once, 2 of 2 cycles  Administration: 800 mg (8/9/2019), 800 mg (10/11/2019)  riTUXimab (RITUXAN) 800 mg in sodium chloride 0 9 % 320 mL first titrated chemo infusion, 832 6 mg, Intravenous, Once, 1 of 1 cycle  Administration: 800 mg (6/14/2019)         History of Present Illness:  Patient is a 30-year-old man with history of follicular lymphoma, treated in 2017  There is some concern about recurrence based on recent PET scan  He has been referred for lymph node biopsy to evaluate this possibility  He is otherwise doing well  He has noticed a swelling in his neck on the right side himself, but otherwise has no fevers, chills, night sweats, or unintended weight loss      Review of Systems   Constitutional: Negative for activity change, appetite change, fever and unexpected weight change  HENT: Negative  Eyes: Negative  Respiratory: Negative  Negative for shortness of breath  Cardiovascular: Negative  Gastrointestinal: Negative  Endocrine: Negative  Genitourinary: Negative  Musculoskeletal: Negative  Skin: Negative  Allergic/Immunologic: Negative  Neurological: Negative  Hematological: Positive for adenopathy  Psychiatric/Behavioral: Negative  Patient Active Problem List   Diagnosis    Grade 3a follicular lymphoma of lymph nodes of multiple regions ([facility] )    Lymphadenopathy     Past Medical History:   Diagnosis Date    Lymph nodes enlarged     CT bx-retropertineum today 5/18/2017    Lymphoma ([facility] )     follicular    Spleen enlarged     CT bx today retropertineum-5/18/2017     Past Surgical History:   Procedure Laterality Date    COLONOSCOPY      DENTAL SURGERY      NASAL SEPTUM SURGERY       No family history on file  Social History     Socioeconomic History    Marital status: /Civil Union     Spouse name: Not on file    Number of children: Not on file    Years of education: Not on file    Highest education level: Not on file   Occupational History    Not on file   Social Needs    Financial resource strain: Not on file    Food insecurity     Worry: Not on file     Inability: Not on file   [employer] needs     Medical: Not on file     Non-medical: Not on file   Tobacco Use    Smoking status: Never Smoker    Smokeless tobacco: Never Used   Substance and Sexual Activity    Alcohol use:  Yes     Alcohol/week: 2 0 standard drinks     Types: 2 Glasses of wine per week     Comment: monthly    Drug use: No    Sexual activity: Not on file   Lifestyle    Physical activity     Days per week: Not on file     Minutes per session: Not on file    Stress: Not on file   Relationships    Social connections     Talks on phone: Not on file     Gets together: Not on file Attends Caodaism service: Not on file     Active member of club or organization: Not on file     Attends meetings of clubs or organizations: Not on file     Relationship status: Not on file    Intimate partner violence     Fear of current or ex partner: Not on file     Emotionally abused: Not on file     Physically abused: Not on file     Forced sexual activity: Not on file   Other Topics Concern    Not on file   Social History Narrative    Not on file     No current outpatient medications on file  No Known Allergies  Vitals:    09/14/20 0937   BP: 130/80   Pulse: 70   Resp: 16   Temp: 97 5 °F (36 4 °C)       Physical Exam  Constitutional:       Appearance: Normal appearance  HENT:      Nose: Nose normal    Eyes:      General: No scleral icterus  Extraocular Movements: Extraocular movements intact  Pupils: Pupils are equal, round, and reactive to light  Neck:      Musculoskeletal: Normal range of motion and neck supple  No neck rigidity or muscular tenderness  Cardiovascular:      Rate and Rhythm: Normal rate and regular rhythm  Heart sounds: Normal heart sounds  Pulmonary:      Effort: Pulmonary effort is normal  No respiratory distress  Breath sounds: Normal breath sounds  No stridor  Abdominal:      General: Abdomen is flat  Comments: Bilateral inguinal adenopathy palpable   Musculoskeletal: Normal range of motion  Lymphadenopathy:      Cervical: Cervical adenopathy present  Skin:     General: Skin is warm and dry  Neurological:      General: No focal deficit present  Mental Status: He is alert and oriented to person, place, and time  Psychiatric:         Mood and Affect: Mood normal          Behavior: Behavior normal          Thought Content:  Thought content normal          Judgment: Judgment normal          Pathology:  none    Labs:  Lab Results   Component Value Date     08/16/2017    SODIUM 142 06/23/2020    K 4 2 06/23/2020     06/23/2020 CO2 27 06/23/2020    AGAP 8 06/23/2020    BUN 11 06/23/2020    CREATININE 0 86 06/23/2020    GLUC 96 06/23/2020    GLUF 106 (H) 09/29/2017    CALCIUM 8 8 06/23/2020    AST 20 06/23/2020    ALT 32 06/23/2020    ALKPHOS 76 06/23/2020    PROT 6 3 08/16/2017    TP 6 6 06/23/2020    BILITOT 0 5 08/16/2017    TBILI 0 55 06/23/2020    EGFR 100 06/23/2020         Imaging  Nm Pet Ct Skull Base To Mid Thigh    Result Date: 9/3/2020  Narrative: PET/CT SCAN INDICATION:  C82 38: Follicular lymphoma grade iiia, lymph nodes of multiple sites   , restaging for treatment management MODIFIER: PS COMPARISON: PET CT 3/18/2020 and priors CELL TYPE:  Follicular lymphoma, retroperitoneal node biopsy 5/18/2017 TECHNIQUE:   8 3 mCi F-18-FDG administered IV  Multiplanar attenuation corrected and non attenuation corrected PET images were acquired 60 minutes post injection  Contiguous, low dose, axial CT sections were obtained from the skull base through the femurs   Intravenous contrast material was not utilized  This examination, like all CT scans performed in the [facility], was performed utilizing techniques to minimize radiation dose exposure, including the use of iterative reconstruction and automated exposure control  Fasting serum glucose: 87 mg/dl FINDINGS: For reference: Mediastinal blood pool SUV 2 6  Liver SUV 3 1  VISUALIZED BRAIN:   No acute abnormalities are seen  Unchanged prominent CSF space in the posterior fossa  HEAD/NECK:   There are numerous new hypermetabolic bilateral cervical nodes extending from the level of the parotid glands to the supraclavicular regions, compatible with disease recurrence  Example node along the posterior right parotid series 3 image 48 measuring 9 x 6 mm, SUV 6 5  Node anterior to the right submandibular gland image 59 measures 1 3 x 0 8 cm, SUV 7 3  Left supraclavicular node image 81 measures 10 x 9 mm, SUV 3 6   CT images: Stable partial opacification of the right maxillary sinus  CHEST:   Numerous new hypermetabolic bilateral axillary, hilar and mediastinal nodes, compatible with disease recurrence  Example left axillary node image 99 measures 1 4 x 0 7 cm, SUV 5 6  Right axillary node image 94 measures 1 9 x 1 4 cm, SUV 10 1  Right paratracheal node image 102 measures 1 5 x 0 8 cm, SUV 5 5  Cluster of subcarinal nodes have an SUV of 6 9  Right hilar SUV 4 9  Left hilar SUV 3  Hypermetabolic node between the distal esophagus and distal descending thoracic aorta image 123 measures 2 2 x 1 3 cm, SUV 8  Small hypermetabolic foci also noted anterior to the diaphragm  CT images: Stable 4 mm right lower lung nodule image 117  ABDOMEN/PELVIS:   Innumerable new hypermetabolic retroperitoneal, mesenteric, retrocrural, and periportal nodes extending into the pelvis along the bilateral iliac vessels and pelvic sidewalls, as well as bilateral inguinal regions  Findings are compatible with disease recurrence  Example periportal node image 157 measures 1 8 x 2 1 cm, SUV 11 5  Prior measurement 8 x 10 mm, SUV 1 5  Portal caval node image 163 measures 4 5 x 1 3 cm, SUV 9 5  Prior measurement 2 6 x 0 5 cm, SUV 1 4  Cluster of small aortocaval nodes have an SUV of 7 9  Right common iliac node image 216 measures 1 5 x 0 8 cm, SUV 10  More distally, right common iliac node image 228 measures 1 5 x 1 6 cm, SUV 13 2  Right external iliac node image 256 measures 2 1 x 1 cm, SUV 11 4  Left external iliac node image 252 measures 2 7 x 1 5 cm, SUV 17 4  Left external iliac node image 263 measures 2 x 3 6 cm, SUV 15 3  At the same level, right external iliac node image 264 measures 1 8 x 2 4 cm, SUV 13 5  Left inguinal node image 273 measures 1 5 x 2 5 cm, SUV 16 4  There is also mildly increased splenomegaly with increased FDG activity, measuring 11 5 cm transverse x 7 5 cm AP x 15 cm craniocaudad dimension, SUV 4 4  On the prior exam, the spleen measured 10 6 x 6 3 x 14 6 cm, SUV 2 5    CT images: Otherwise stable  OSSEOUS STRUCTURES: Stable proximal right femoral lucency with mild FDG activity, SUV 3 2, prior SUV 3 6  No new FDG avid lesions are seen  CT images: No significant findings  Impression: 1  Innumerable new hypermetabolic nodes throughout the neck, chest, abdomen and pelvis, compatible with disease recurrence  Deauville score of 5  2   There is also splenic involvement with mildly increased splenic size and FDG uptake  The study was marked in EPIC for significant notification  Workstation performed: [id number]     I reviewed the above laboratory and imaging data  Discussion/Summary:  41-year-old man, history of lymphoma, now with new nodes in neck, chest, abdomen, and groin suspicious for recurrence  Will set up for lymph node biopsy for flow cytometry  Most prominent nodes appear to be in the bilateral groins  Will set up for right versus left excisional biopsy for flow cytometry  Rationale for this along with risks and benefits of surgery including infection, bleeding, wound problems, seroma, discussed the patient  He understands the plan wishes to proceed as outlined

## 2020-09-17 NOTE — PRE-PROCEDURE INSTRUCTIONS
Pre-Surgery Instructions:   Medication Instructions    traMADol (ULTRAM) 50 mg tablet Instructed patient per Anesthesia Guidelines  NO MEDS NEEDED DOS  INSTR  ON ASC LOC  ,BRING PHOTO ID/MED LIST/INS  INFO , SHOWER REV , NO ASA/NSAIDS/VIT 1 WEEK PREOP  Pre Procedure Consult Calls    1  Are you currently experiencing symptoms of fever >100 4, cough, or shortness of breath or sore throat? ______YES    __X___NO   If yes, then please call your PCP immediately for further direction  If you are completing this form on site, please find the safest and most direct route to the nearest exit and avoid close contact with others until you can get further advice from your PCP  2  Have you recently traveled to any foreign country or area within the [place] that has reported cases of COVID-19? ______YES      __X___NO   IF YES, LIST LOCATION(S): __________________________   3  Have you recently been in contact with someone who is a suspected or confirmed case of COVID-19?   ______ YES   _____X_ No   INSTRUCTED ON NEW COVID VISITOR POLICY- ONLY 1 VISITOR, ALL MUST WEAR MASKS, TEMP WILL BE TAKEN @ DOOR

## 2020-09-22 ENCOUNTER — HOSPITAL ENCOUNTER (OUTPATIENT)
Facility: HOSPITAL | Age: 50
Setting detail: OUTPATIENT SURGERY
Discharge: HOME | End: 2020-09-22
Attending: SURGERY | Admitting: SURGERY
Payer: COMMERCIAL

## 2020-09-22 ENCOUNTER — ANESTHESIA EVENT (OUTPATIENT)
Dept: PERIOP | Facility: HOSPITAL | Age: 50
End: 2020-09-22
Payer: COMMERCIAL

## 2020-09-22 ENCOUNTER — ANESTHESIA (OUTPATIENT)
Dept: PERIOP | Facility: HOSPITAL | Age: 50
End: 2020-09-22
Payer: COMMERCIAL

## 2020-09-22 VITALS
HEART RATE: 65 BPM | WEIGHT: 205 LBS | BODY MASS INDEX: 27.17 KG/M2 | HEIGHT: 73 IN | TEMPERATURE: 96.4 F | DIASTOLIC BLOOD PRESSURE: 67 MMHG | RESPIRATION RATE: 16 BRPM | SYSTOLIC BLOOD PRESSURE: 117 MMHG | OXYGEN SATURATION: 99 %

## 2020-09-22 VITALS — HEART RATE: 75 BPM

## 2020-09-22 DIAGNOSIS — R59.1 LYMPHADENOPATHY: ICD-10-CM

## 2020-09-22 PROCEDURE — 88364 INSITU HYBRIDIZATION (FISH): CPT | Performed by: PATHOLOGY

## 2020-09-22 PROCEDURE — 88342 IMHCHEM/IMCYTCHM 1ST ANTB: CPT | Performed by: PATHOLOGY

## 2020-09-22 PROCEDURE — 88305 TISSUE EXAM BY PATHOLOGIST: CPT | Performed by: PATHOLOGY

## 2020-09-22 PROCEDURE — 38531 OPEN BX/EXC INGUINOFEM NODES: CPT | Performed by: SURGERY

## 2020-09-22 PROCEDURE — 88185 FLOWCYTOMETRY/TC ADD-ON: CPT

## 2020-09-22 PROCEDURE — 88184 FLOWCYTOMETRY/ TC 1 MARKER: CPT | Performed by: SURGERY

## 2020-09-22 PROCEDURE — 88341 IMHCHEM/IMCYTCHM EA ADD ANTB: CPT | Performed by: PATHOLOGY

## 2020-09-22 PROCEDURE — 88365 INSITU HYBRIDIZATION (FISH): CPT | Performed by: PATHOLOGY

## 2020-09-22 RX ORDER — GLYCOPYRROLATE 0.2 MG/ML
INJECTION INTRAMUSCULAR; INTRAVENOUS AS NEEDED
Status: DISCONTINUED | OUTPATIENT
Start: 2020-09-22 | End: 2020-09-22

## 2020-09-22 RX ORDER — FENTANYL CITRATE 50 UG/ML
INJECTION, SOLUTION INTRAMUSCULAR; INTRAVENOUS AS NEEDED
Status: DISCONTINUED | OUTPATIENT
Start: 2020-09-22 | End: 2020-09-22

## 2020-09-22 RX ORDER — SODIUM CHLORIDE, SODIUM LACTATE, POTASSIUM CHLORIDE, CALCIUM CHLORIDE 600; 310; 30; 20 MG/100ML; MG/100ML; MG/100ML; MG/100ML
INJECTION, SOLUTION INTRAVENOUS CONTINUOUS PRN
Status: DISCONTINUED | OUTPATIENT
Start: 2020-09-22 | End: 2020-09-22

## 2020-09-22 RX ORDER — ONDANSETRON 2 MG/ML
4 INJECTION INTRAMUSCULAR; INTRAVENOUS EVERY 6 HOURS PRN
Status: DISCONTINUED | OUTPATIENT
Start: 2020-09-22 | End: 2020-09-22 | Stop reason: HOSPADM

## 2020-09-22 RX ORDER — MIDAZOLAM HYDROCHLORIDE 2 MG/2ML
INJECTION, SOLUTION INTRAMUSCULAR; INTRAVENOUS AS NEEDED
Status: DISCONTINUED | OUTPATIENT
Start: 2020-09-22 | End: 2020-09-22

## 2020-09-22 RX ORDER — LIDOCAINE HYDROCHLORIDE 10 MG/ML
INJECTION, SOLUTION EPIDURAL; INFILTRATION; INTRACAUDAL; PERINEURAL AS NEEDED
Status: DISCONTINUED | OUTPATIENT
Start: 2020-09-22 | End: 2020-09-22

## 2020-09-22 RX ORDER — DEXAMETHASONE SODIUM PHOSPHATE 10 MG/ML
INJECTION, SOLUTION INTRAMUSCULAR; INTRAVENOUS AS NEEDED
Status: DISCONTINUED | OUTPATIENT
Start: 2020-09-22 | End: 2020-09-22

## 2020-09-22 RX ORDER — KETAMINE HCL IN NACL, ISO-OSM 100MG/10ML
SYRINGE (ML) INJECTION AS NEEDED
Status: DISCONTINUED | OUTPATIENT
Start: 2020-09-22 | End: 2020-09-22

## 2020-09-22 RX ORDER — PROPOFOL 10 MG/ML
INJECTION, EMULSION INTRAVENOUS CONTINUOUS PRN
Status: DISCONTINUED | OUTPATIENT
Start: 2020-09-22 | End: 2020-09-22

## 2020-09-22 RX ORDER — KETOROLAC TROMETHAMINE 30 MG/ML
INJECTION, SOLUTION INTRAMUSCULAR; INTRAVENOUS AS NEEDED
Status: DISCONTINUED | OUTPATIENT
Start: 2020-09-22 | End: 2020-09-22

## 2020-09-22 RX ORDER — PROPOFOL 10 MG/ML
INJECTION, EMULSION INTRAVENOUS AS NEEDED
Status: DISCONTINUED | OUTPATIENT
Start: 2020-09-22 | End: 2020-09-22

## 2020-09-22 RX ORDER — BUPIVACAINE HYDROCHLORIDE 2.5 MG/ML
INJECTION, SOLUTION EPIDURAL; INFILTRATION; INTRACAUDAL AS NEEDED
Status: DISCONTINUED | OUTPATIENT
Start: 2020-09-22 | End: 2020-09-22 | Stop reason: HOSPADM

## 2020-09-22 RX ORDER — FENTANYL CITRATE/PF 50 MCG/ML
50 SYRINGE (ML) INJECTION
Status: DISCONTINUED | OUTPATIENT
Start: 2020-09-22 | End: 2020-09-22 | Stop reason: HOSPADM

## 2020-09-22 RX ORDER — OXYCODONE HYDROCHLORIDE 5 MG/1
5 TABLET ORAL EVERY 4 HOURS PRN
Status: DISCONTINUED | OUTPATIENT
Start: 2020-09-22 | End: 2020-09-22 | Stop reason: HOSPADM

## 2020-09-22 RX ORDER — DIPHENHYDRAMINE HYDROCHLORIDE 50 MG/ML
12.5 INJECTION INTRAMUSCULAR; INTRAVENOUS ONCE AS NEEDED
Status: DISCONTINUED | OUTPATIENT
Start: 2020-09-22 | End: 2020-09-22 | Stop reason: HOSPADM

## 2020-09-22 RX ORDER — ONDANSETRON 2 MG/ML
INJECTION INTRAMUSCULAR; INTRAVENOUS AS NEEDED
Status: DISCONTINUED | OUTPATIENT
Start: 2020-09-22 | End: 2020-09-22

## 2020-09-22 RX ORDER — OXYCODONE HYDROCHLORIDE 10 MG/1
10 TABLET ORAL EVERY 6 HOURS PRN
Status: DISCONTINUED | OUTPATIENT
Start: 2020-09-22 | End: 2020-09-22 | Stop reason: HOSPADM

## 2020-09-22 RX ORDER — ACETAMINOPHEN 325 MG/1
650 TABLET ORAL EVERY 4 HOURS PRN
Status: DISCONTINUED | OUTPATIENT
Start: 2020-09-22 | End: 2020-09-22 | Stop reason: HOSPADM

## 2020-09-22 RX ADMIN — DEXAMETHASONE SODIUM PHOSPHATE 10 MG: 10 INJECTION, SOLUTION INTRAMUSCULAR; INTRAVENOUS at 07:49

## 2020-09-22 RX ADMIN — SODIUM CHLORIDE, SODIUM LACTATE, POTASSIUM CHLORIDE, AND CALCIUM CHLORIDE: .6; .31; .03; .02 INJECTION, SOLUTION INTRAVENOUS at 07:24

## 2020-09-22 RX ADMIN — PROPOFOL 100 MCG/KG/MIN: 10 INJECTION, EMULSION INTRAVENOUS at 07:27

## 2020-09-22 RX ADMIN — ONDANSETRON 4 MG: 2 INJECTION INTRAMUSCULAR; INTRAVENOUS at 08:06

## 2020-09-22 RX ADMIN — Medication 10 MG: at 08:05

## 2020-09-22 RX ADMIN — LIDOCAINE HYDROCHLORIDE 50 MG: 10 INJECTION, SOLUTION EPIDURAL; INFILTRATION; INTRACAUDAL; PERINEURAL at 07:27

## 2020-09-22 RX ADMIN — FENTANYL CITRATE 25 MCG: 50 INJECTION, SOLUTION INTRAMUSCULAR; INTRAVENOUS at 07:53

## 2020-09-22 RX ADMIN — MIDAZOLAM 2 MG: 1 INJECTION INTRAMUSCULAR; INTRAVENOUS at 07:24

## 2020-09-22 RX ADMIN — PROPOFOL 20 MG: 10 INJECTION, EMULSION INTRAVENOUS at 07:53

## 2020-09-22 RX ADMIN — Medication 30 MG: at 07:29

## 2020-09-22 RX ADMIN — GLYCOPYRROLATE 0.1 MG: 0.2 INJECTION, SOLUTION INTRAMUSCULAR; INTRAVENOUS at 07:49

## 2020-09-22 RX ADMIN — KETOROLAC TROMETHAMINE 30 MG: 30 INJECTION, SOLUTION INTRAMUSCULAR at 08:28

## 2020-09-22 RX ADMIN — PROPOFOL 200 MG: 10 INJECTION, EMULSION INTRAVENOUS at 07:27

## 2020-09-22 NOTE — ANESTHESIA PREPROCEDURE EVALUATION
Procedure:  EXCISION BIOPSY LYMPH NODE INGUINAL, lymphoma protocol (Right Groin)    Relevant Problems   HEMATOLOGY   (+) Grade 3a follicular lymphoma of lymph nodes of multiple regions [facility])        Physical Exam    Airway    Mallampati score: II  TM Distance: >3 FB  Neck ROM: full     Dental   No notable dental hx     Cardiovascular      Pulmonary      Other Findings        Anesthesia Plan  ASA Score- 2     Anesthesia Type- general with ASA Monitors  Additional Monitors:   Airway Plan: LMA  Plan Factors-    Chart reviewed  Patient summary reviewed  Induction- intravenous  Postoperative Plan- Plan for postoperative opioid use  Informed Consent-   I personally reviewed this patient with the CRNA  Discussed and agreed on the Anesthesia Plan with the CRNA  Dara Soulier

## 2020-09-22 NOTE — ANESTHESIA POSTPROCEDURE EVALUATION
Post-Op Assessment Note    CV Status:  Stable  Pain Score: 0    Pain management: adequate     Mental Status:  Alert, awake and sleepy   Hydration Status:  Euvolemic   PONV Controlled:  Controlled   Airway Patency:  Patent      Post Op Vitals Reviewed: Yes      Staff: CRNA         No complications documented      BP   93/69   Temp 98 5   Pulse 66   Resp 10   SpO2 99

## 2020-09-22 NOTE — DISCHARGE INSTRUCTIONS
Discharge Instructions     Please go to your post op appointment scheduled for 10/6/2020 at 8:00 AM      Activity:  - No strenuous physical activity or exercise for at least 4 weeks  - Walking and normal light activities are encouraged  - Normal daily activities including climbing steps are okay  Return to work:    - You may return to work in 2 weeks or sooner if you are feeling well enough  Diet:    - You may resume your normal diet  Wound Care:  - Your incisions were closed with absorbable suture and covered with a special surgical glue  - Do NOT pick or peel the glue  It will come off on its own when the incision under it has healed in 1-2 weeks  - Do NOT soak incisions under water such as in a bathtub, hot tub, or pool for 2 weeks  - You may shower and let soapy water run over your incisions, then gently dab dry  Do NOT scrub  - Do not apply any creams or ointments  - You may apply ice as needed (no longer than 20 minutes an hour) for the first 48 hours  - Bruising is not unusual and will go away with a little time  You may apply a warm, moist compress that may help the bruising resolve quicker  Medications:    - You may resume all of your regular medications, including blood thinners and aspirin, after going home unless otherwise instructed  Please refer to your discharge medication list for further details  - Please take the pain medications as directed  - You are encouraged to use over the counter Tylenol and Ibuprofen as needed for pain control  Follow the instructions on the bottle  Additional Instructions:  - If you have any questions or concerns after discharge please call the office   - Call office or return to ER if fever greater than 101, chills, persistent nausea/vomiting, worsening/uncontrollable pain, and/or increasing redness or purulent/foul smelling drainage from incision(s)

## 2020-09-22 NOTE — OP NOTE
OPERATIVE REPORT  PATIENT NAME: Rody Ceron    :  1970  MRN: [id number]  Pt Location: [facility] OR ROOM 06    SURGERY DATE: 2020    Surgeon(s) and Role:     * Melva Workman MD - Primary    Preop Diagnosis:  Lymphadenopathy [R59 1]    Post-Op Diagnosis Codes:     * Lymphadenopathy [R59 1]    Procedure(s) (LRB):  EXCISION BIOPSY LYMPH NODE INGUINAL, lymphoma protocol (Right)    Specimen(s):  ID Type Source Tests Collected by Time Destination   1 : Right Inguinal Lymph Node Tissue Lymph Node - Lymphoma Prtocol TISSUE EXAM, LEUKEMIA/LYMPHOMA FLOW CYTOMETRY Melva Workman MD 2020 0800        Estimated Blood Loss:   Minimal    Drains:  * No LDAs found *    Anesthesia Type:   General    Operative Indications:  Lymphadenopathy [R59 1]  Hx of lymphoma    Operative Findings:  Right inguinal adenopathy    Complications:   None    Procedure and Technique:  Patient is a 76-year-old man with history of lymphoma  He has new adenopathy  He comes in for excisional node biopsy to rule out possibly recurrence  He is brought the OR and identified by proper time-out  Following this he was intubated by anesthesia team   He then prepped draped the right groin exposed  Nodes were palpated  The overlying skin in the right groin was anesthetized, then a pre and 0 5 cm incision made sharply  Cautery was used to dissect through the dermis and subcutaneous tissue through Jennifer's fascia  The noticed visualized and dissected out in hemostatic fashion  Lymphatic pedicles were tied off with a 3 0 Vicryl suture  Specimen was sent for flow cytometry  Once we received word from the pathology department that we had adequate specimen, we proceeded to irrigate, then close once hemostasis was confirmed  This was done by running the Jennifer's fascia with 3 0 Vicryl suture  Then used 0 Vicryl close the dermis  We then used 4 Monocryl to close the skin in subcuticular fashion    Skin glue was then applied for the patient was awakened transferred to recovery room in stable condition  Sponge and instrument counts were correct at the end the case     I was present for the entire procedure    Patient Disposition:  PACU     SIGNATURE: Kevin Suresh MD  DATE: September 22, 2020  TIME: 8:36 AM

## 2020-09-22 NOTE — INTERVAL H&P NOTE
H&P reviewed  After examining the patient I find no changes in the patients condition since the H&P had been written      Vitals:    09/22/20 0600   BP: 108/72   Pulse: 66   Resp: 20   Temp: (!) 96 7 °F (35 9 °C)   SpO2: 100%

## 2020-09-23 LAB — SCAN RESULT: NORMAL

## 2020-09-29 ENCOUNTER — OFFICE VISIT (OUTPATIENT)
Dept: HEMATOLOGY ONCOLOGY | Facility: CLINIC | Age: 50
End: 2020-09-29
Payer: COMMERCIAL

## 2020-09-29 DIAGNOSIS — C82.38 GRADE 3A FOLLICULAR LYMPHOMA OF LYMPH NODES OF MULTIPLE REGIONS (HCC): Primary | ICD-10-CM

## 2020-09-29 PROCEDURE — 99214 OFFICE O/P EST MOD 30 MIN: CPT | Performed by: INTERNAL MEDICINE

## 2020-10-06 ENCOUNTER — OFFICE VISIT (OUTPATIENT)
Dept: SURGICAL ONCOLOGY | Facility: CLINIC | Age: 50
End: 2020-10-06

## 2020-10-06 VITALS
DIASTOLIC BLOOD PRESSURE: 76 MMHG | BODY MASS INDEX: 27.54 KG/M2 | WEIGHT: 207.8 LBS | SYSTOLIC BLOOD PRESSURE: 120 MMHG | TEMPERATURE: 97.5 F | HEART RATE: 74 BPM | HEIGHT: 73 IN

## 2020-10-06 DIAGNOSIS — C82.38 GRADE 3A FOLLICULAR LYMPHOMA OF LYMPH NODES OF MULTIPLE REGIONS (HCC): Primary | ICD-10-CM

## 2020-10-06 PROCEDURE — 99024 POSTOP FOLLOW-UP VISIT: CPT | Performed by: SURGERY

## 2020-12-08 ENCOUNTER — TELEPHONE (OUTPATIENT)
Dept: HEMATOLOGY ONCOLOGY | Facility: CLINIC | Age: 50
End: 2020-12-08

## 2020-12-09 ENCOUNTER — HOSPITAL ENCOUNTER (OUTPATIENT)
Dept: INFUSION CENTER | Facility: CLINIC | Age: 50
End: 2020-12-09

## 2020-12-10 ENCOUNTER — TELEMEDICINE (OUTPATIENT)
Dept: INTERNAL MEDICINE CLINIC | Facility: CLINIC | Age: 50
End: 2020-12-10
Payer: COMMERCIAL

## 2020-12-10 VITALS — BODY MASS INDEX: 27.43 KG/M2 | WEIGHT: 207 LBS | HEIGHT: 73 IN

## 2020-12-10 DIAGNOSIS — Z20.828 EXPOSURE TO SARS-ASSOCIATED CORONAVIRUS: ICD-10-CM

## 2020-12-10 DIAGNOSIS — R05.9 COUGH: Primary | ICD-10-CM

## 2020-12-10 DIAGNOSIS — R53.83 FATIGUE, UNSPECIFIED TYPE: ICD-10-CM

## 2020-12-10 DIAGNOSIS — R05.9 COUGH: ICD-10-CM

## 2020-12-10 DIAGNOSIS — C82.38 GRADE 3A FOLLICULAR LYMPHOMA OF LYMPH NODES OF MULTIPLE REGIONS (HCC): ICD-10-CM

## 2020-12-10 PROCEDURE — U0003 INFECTIOUS AGENT DETECTION BY NUCLEIC ACID (DNA OR RNA); SEVERE ACUTE RESPIRATORY SYNDROME CORONAVIRUS 2 (SARS-COV-2) (CORONAVIRUS DISEASE [COVID-19]), AMPLIFIED PROBE TECHNIQUE, MAKING USE OF HIGH THROUGHPUT TECHNOLOGIES AS DESCRIBED BY CMS-2020-01-R: HCPCS | Performed by: INTERNAL MEDICINE

## 2020-12-10 PROCEDURE — 3008F BODY MASS INDEX DOCD: CPT | Performed by: INTERNAL MEDICINE

## 2020-12-10 PROCEDURE — 3725F SCREEN DEPRESSION PERFORMED: CPT | Performed by: INTERNAL MEDICINE

## 2020-12-10 PROCEDURE — 99213 OFFICE O/P EST LOW 20 MIN: CPT | Performed by: INTERNAL MEDICINE

## 2020-12-12 LAB — SARS-COV-2 RNA SPEC QL NAA+PROBE: DETECTED

## 2020-12-14 ENCOUNTER — TELEMEDICINE (OUTPATIENT)
Dept: INTERNAL MEDICINE CLINIC | Facility: CLINIC | Age: 50
End: 2020-12-14
Payer: COMMERCIAL

## 2020-12-14 DIAGNOSIS — C82.38 GRADE 3A FOLLICULAR LYMPHOMA OF LYMPH NODES OF MULTIPLE REGIONS (HCC): ICD-10-CM

## 2020-12-14 DIAGNOSIS — U07.1 COVID-19 VIRUS RNA DETECTED: Primary | ICD-10-CM

## 2020-12-14 PROCEDURE — 1036F TOBACCO NON-USER: CPT | Performed by: INTERNAL MEDICINE

## 2020-12-14 PROCEDURE — 99213 OFFICE O/P EST LOW 20 MIN: CPT | Performed by: INTERNAL MEDICINE

## 2020-12-30 ENCOUNTER — HOSPITAL ENCOUNTER (OUTPATIENT)
Dept: INFUSION CENTER | Facility: CLINIC | Age: 50
Discharge: HOME | End: 2020-12-30
Payer: COMMERCIAL

## 2020-12-30 VITALS — TEMPERATURE: 97 F

## 2020-12-30 DIAGNOSIS — C82.38 GRADE 3A FOLLICULAR LYMPHOMA OF LYMPH NODES OF MULTIPLE REGIONS (HCC): Primary | ICD-10-CM

## 2020-12-30 LAB
ALBUMIN SERPL BCP-MCNC: 3.4 G/DL (ref 3.5–5)
ALP SERPL-CCNC: 147 U/L (ref 46–116)
ALT SERPL W P-5'-P-CCNC: 23 U/L (ref 12–78)
ANION GAP SERPL CALCULATED.3IONS-SCNC: 7 MMOL/L (ref 4–13)
AST SERPL W P-5'-P-CCNC: 14 U/L (ref 5–45)
BASOPHILS # BLD AUTO: 0.07 THOUSANDS/ΜL (ref 0–0.1)
BASOPHILS NFR BLD AUTO: 1 % (ref 0–1)
BILIRUB SERPL-MCNC: 0.68 MG/DL (ref 0.2–1)
BUN SERPL-MCNC: 12 MG/DL (ref 5–25)
CALCIUM ALBUM COR SERPL-MCNC: 9.2 MG/DL (ref 8.3–10.1)
CALCIUM SERPL-MCNC: 8.7 MG/DL (ref 8.3–10.1)
CHLORIDE SERPL-SCNC: 102 MMOL/L (ref 100–108)
CO2 SERPL-SCNC: 28 MMOL/L (ref 21–32)
CREAT SERPL-MCNC: 0.83 MG/DL (ref 0.6–1.3)
EOSINOPHIL # BLD AUTO: 0.14 THOUSAND/ΜL (ref 0–0.61)
EOSINOPHIL NFR BLD AUTO: 2 % (ref 0–6)
ERYTHROCYTE [DISTWIDTH] IN BLOOD BY AUTOMATED COUNT: 13.4 % (ref 11.6–15.1)
GFR SERPL CREATININE-BSD FRML MDRD: 102 ML/MIN/1.73SQ M
GLUCOSE SERPL-MCNC: 89 MG/DL (ref 65–140)
HCT VFR BLD AUTO: 42.4 % (ref 36.5–49.3)
HGB BLD-MCNC: 13.9 G/DL (ref 12–17)
IMM GRANULOCYTES # BLD AUTO: 0.06 THOUSAND/UL (ref 0–0.2)
IMM GRANULOCYTES NFR BLD AUTO: 1 % (ref 0–2)
LDH SERPL-CCNC: 156 U/L (ref 81–234)
LYMPHOCYTES # BLD AUTO: 1.68 THOUSANDS/ΜL (ref 0.6–4.47)
LYMPHOCYTES NFR BLD AUTO: 21 % (ref 14–44)
MCH RBC QN AUTO: 27.6 PG (ref 26.8–34.3)
MCHC RBC AUTO-ENTMCNC: 32.8 G/DL (ref 31.4–37.4)
MCV RBC AUTO: 84 FL (ref 82–98)
MONOCYTES # BLD AUTO: 0.82 THOUSAND/ΜL (ref 0.17–1.22)
MONOCYTES NFR BLD AUTO: 10 % (ref 4–12)
NEUTROPHILS # BLD AUTO: 5.19 THOUSANDS/ΜL (ref 1.85–7.62)
NEUTS SEG NFR BLD AUTO: 65 % (ref 43–75)
NRBC BLD AUTO-RTO: 0 /100 WBCS
PLATELET # BLD AUTO: 162 THOUSANDS/UL (ref 149–390)
PMV BLD AUTO: 9.5 FL (ref 8.9–12.7)
POTASSIUM SERPL-SCNC: 4.4 MMOL/L (ref 3.5–5.3)
PROT SERPL-MCNC: 6.2 G/DL (ref 6.4–8.2)
RBC # BLD AUTO: 5.03 MILLION/UL (ref 3.88–5.62)
SODIUM SERPL-SCNC: 137 MMOL/L (ref 136–145)
WBC # BLD AUTO: 7.96 THOUSAND/UL (ref 4.31–10.16)

## 2020-12-30 PROCEDURE — 36593 DECLOT VASCULAR DEVICE: CPT

## 2020-12-30 PROCEDURE — 85025 COMPLETE CBC W/AUTO DIFF WBC: CPT

## 2020-12-30 PROCEDURE — 80053 COMPREHEN METABOLIC PANEL: CPT

## 2020-12-30 PROCEDURE — 83615 LACTATE (LD) (LDH) ENZYME: CPT

## 2020-12-30 RX ADMIN — ALTEPLASE 2 MG: 2.2 INJECTION, POWDER, LYOPHILIZED, FOR SOLUTION INTRAVENOUS at 13:40

## 2020-12-30 NOTE — PROGRESS NOTES
Patient to [facility] for Lab testing / Port maintenance: Offers no complaints at present time: Right PAC accessed without difficulty: No blood return noted: Flushes freely

## 2020-12-30 NOTE — PROGRESS NOTES
Right PAC with good blood return: Flushes without difficulty: Labs drawn per MD order: Tolerated procedure without incident: No adverse reactions noted: No further appt's scheduled:  Will make upon discharge: AVS offered and declined

## 2020-12-31 ENCOUNTER — TELEPHONE (OUTPATIENT)
Dept: HEMATOLOGY ONCOLOGY | Facility: CLINIC | Age: 50
End: 2020-12-31

## 2021-01-19 ENCOUNTER — DOCUMENTATION (OUTPATIENT)
Dept: HEMATOLOGY ONCOLOGY | Facility: MEDICAL CENTER | Age: 51
End: 2021-01-19

## 2021-01-20 ENCOUNTER — OFFICE VISIT (OUTPATIENT)
Dept: HEMATOLOGY ONCOLOGY | Facility: CLINIC | Age: 51
End: 2021-01-20
Payer: COMMERCIAL

## 2021-01-20 VITALS
OXYGEN SATURATION: 97 % | DIASTOLIC BLOOD PRESSURE: 60 MMHG | TEMPERATURE: 96.7 F | WEIGHT: 207.6 LBS | RESPIRATION RATE: 16 BRPM | HEIGHT: 73 IN | BODY MASS INDEX: 27.51 KG/M2 | HEART RATE: 73 BPM | SYSTOLIC BLOOD PRESSURE: 104 MMHG

## 2021-01-20 DIAGNOSIS — C82.38 GRADE 3A FOLLICULAR LYMPHOMA OF LYMPH NODES OF MULTIPLE REGIONS (HCC): Primary | ICD-10-CM

## 2021-01-20 PROCEDURE — 3008F BODY MASS INDEX DOCD: CPT | Performed by: INTERNAL MEDICINE

## 2021-01-20 PROCEDURE — 99214 OFFICE O/P EST MOD 30 MIN: CPT | Performed by: INTERNAL MEDICINE

## 2021-01-20 PROCEDURE — 1036F TOBACCO NON-USER: CPT | Performed by: INTERNAL MEDICINE

## 2021-01-20 NOTE — PROGRESS NOTES
Hematology Outpatient Follow - Up Note  Rich Yun 45 y o  male MRN: @ Encounter: [id number]        Date:  1/20/2021        Assessment/ Plan:    69-year-old  male with stage IV follicular lymphoma grade 3B when he presented with weight loss, splenomegaly, constitutional symptoms, multiple lymphadenopathy above and below the diaphragm bone marrow biopsy showed 10% involvement with follicular lymphoma and also excisional biopsy of the retroperitoneal lymph node confirmed the diagnosis, he was treated with R-CHOP for total of 6 cycles spanning from May 2017 until September 2017 subsequent PET scan showed no evidence of disease and no evidence of splenomegaly or lymphadenopathy     He received maintenance rituximab 375 milligram/meter squared every 2 months for total of 2 years finished in October 2019     Relapse of disease by PET scan in September 2020 showed innumerable bilateral cervical, axillary, mediastinal, retroperitoneal, bilateral iliac, inguinal lymphadenopathy, splenic involvement     Excisional biopsy of the right inguinal area showed relapsed follicular lymphoma stage IIIA, no evidence of transformation positive for CD 19, CD 20, CD 10, Ki-67 about 60%    Normal LDH    He is asymptomatic, he is on watchful observation, he came today he has no constitutional symptoms, he was diagnosed with COVID-19 infection    Physical examination showed mild left submandibular lymphadenopathy, left axillary lymphadenopathy right inguinal lymphadenopathy, could not appreciate any hepatic splenomegaly, continue watchful observation and follow-up in 3 months with CBC, CMP, LDH    Will do PET scan if he has any symptoms or elevation of LDH        Labs and imaging studies are reviewed by ordering provider once results are available  If there are findings that need immediate attention, you will be contacted when results available     Discussing results and the implication on your healthcare is best discussed in person at your follow-up visit  HPI:    Maria Fernanda Yuan a 45 y  o  male with follicular lymphoma      Stage IV follicular lymphoma grade 3B when he presented with splenomegaly, weight loss, constitutional symptoms, multiple lymphadenopathy above and below the diaphragm, bone marrow biopsy showed 10 percent involvement with follicular lymphoma, excision biopsy of 1 of the retroperitoneal lymph node confirmed the diagnosis     No evidence of transformation, 2nd opinion at [address] agreed on treatment with R-CHOP     He received 6 cycles of R-CHOP spanning from May 2017 until September 2017 subsequent PET scan showed no evidence of disease and physical examination showed disappearance of splenomegaly and lymphadenopathy     Maintenance rituximab 375 milligram/meter squared every 2 months for total of 2 years finished in October 2019     PET scan in January 2020 showed uptake in the right inguinal area however patient did not have any constitutional symptoms, he has normal CBC, CMP, LDH we decided to watch and observe, at that time physical examination showed 1 5 cm left inguinal lymph node     Repeat PET scan in March 2020 showed uptake with SUV between 3 and 5 in the mediastinum, right perihilar area, no evidence of uptake in the abdomen or pelvis specially no uptake in the left inguinal area  In June 2020 cough intermittent without sputum production especially in the midthoracic area, denies any constitutional symptoms, CBC, CMP, LDH are normal     Repeat PET scan on 09/03/2020 showed progression of disease with innumerable new lymphadenopathy throughout the neck, chest, retroperitoneal, iliac, inguinal, splenic uptake, no evidence of bulky disease with score of 5, mild increase in the spleen size       Status post excision biopsy of the right inguinal lymph node on 09/22/2020  A   Right inguinal lymph node:  - Follicular lymphoma, follicular pattern, grade 3A (average of 17 centroblasts/HPF) - see Note    - Flow cytometry ([facility]#[id number], evaluated by Dr Sea Turk) reveals:   * CD10+ B-cell lymphoma, comprising 70% of total cells analyzed, of small to medium size with following immunophenotype:    -- positive: CD19, CD20, CD10, CD38, sKappa    -- negative: sLambda, CD5, CD11c, CD23   * Viability 7AAD: 89%    * The following antigens were evaluated & found to be expressed as described above or by appropriate cells:  CD2, CD3, CD4, CD5, CD7, CD8, CD10, CD11c, CD19, CD20, CD23, CD38, CD45, CD56, CD57, FMC7, sKappa, sLambda      There was no evidence of transformation, he was kept on watchful observation    COVID-19 infection in December 2020 with full recovery  Interval History:        Previous Treatment:         Test Results:    Imaging: No results found  Labs:   Lab Results   Component Value Date    WBC 7 96 12/30/2020    HGB 13 9 12/30/2020    HCT 42 4 12/30/2020    MCV 84 12/30/2020     12/30/2020     Lab Results   Component Value Date     08/16/2017    K 4 4 12/30/2020     12/30/2020    CO2 28 12/30/2020    BUN 12 12/30/2020    CREATININE 0 83 12/30/2020    GLUF 106 (H) 09/29/2017    CALCIUM 8 7 12/30/2020    CORRECTEDCA 9 2 12/30/2020    AST 14 12/30/2020    ALT 23 12/30/2020    ALKPHOS 147 (H) 12/30/2020    PROT 6 3 08/16/2017    BILITOT 0 5 08/16/2017    EGFR 102 12/30/2020       Lab Results   Component Value Date    IRON 103 07/01/2019    FERRITIN 187 07/01/2019       No results found for: [id number]      ROS: Review of Systems   Constitutional: Negative  Negative for appetite change, chills, diaphoresis, fatigue, fever and unexpected weight change  HENT:   Positive for lump/mass (Left submandibular lymph node)  Negative for hearing loss, mouth sores, nosebleeds, sore throat, trouble swallowing and voice change  Eyes: Negative  Negative for eye problems and icterus  Respiratory: Negative  Negative for chest tightness, cough, hemoptysis and shortness of breath  Cardiovascular: Negative for chest pain and leg swelling  Gastrointestinal: Negative for abdominal distention, abdominal pain, blood in stool, constipation, diarrhea and nausea  Endocrine: Negative  Genitourinary: Negative for dysuria, frequency, hematuria and pelvic pain  Musculoskeletal: Negative  Negative for arthralgias, back pain, flank pain, gait problem, myalgias and neck stiffness  Skin: Negative for itching and rash  Neurological: Negative for dizziness, gait problem, headaches, light-headedness, numbness and speech difficulty  Hematological: Negative for adenopathy  Does not bruise/bleed easily  Psychiatric/Behavioral: Negative for confusion, decreased concentration, depression and sleep disturbance  The patient is not nervous/anxious  Current Medications: Reviewed  Allergies: Reviewed  PMH/FH/SH:  Reviewed      Physical Exam:    Body surface area is 2 19 meters squared  Wt Readings from Last 3 Encounters:   01/20/21 94 2 kg (207 lb 9 6 oz)   12/10/20 93 9 kg (207 lb)   10/06/20 94 3 kg (207 lb 12 8 oz)        Temp Readings from Last 3 Encounters:   01/20/21 (!) 96 7 °F (35 9 °C) (Tympanic)   12/30/20 (!) 97 °F (36 1 °C) (Temporal)   10/06/20 97 5 °F (36 4 °C) (Temporal)        BP Readings from Last 3 Encounters:   01/20/21 104/60   10/06/20 120/76   09/22/20 117/67         Pulse Readings from Last 3 Encounters:   01/20/21 73   10/06/20 74   09/22/20 65        Physical Exam  Vitals signs reviewed  Constitutional:       General: He is not in acute distress  Appearance: He is well-developed  He is not diaphoretic  HENT:      Head: Normocephalic and atraumatic  Eyes:      Conjunctiva/sclera: Conjunctivae normal    Neck:      Musculoskeletal: Normal range of motion and neck supple  Trachea: No tracheal deviation  Cardiovascular:      Rate and Rhythm: Normal rate and regular rhythm  Heart sounds: No murmur  No friction rub  No gallop      Pulmonary: Effort: Pulmonary effort is normal  No respiratory distress  Breath sounds: Normal breath sounds  No wheezing or rales  Chest:      Chest wall: No tenderness  Abdominal:      General: There is no distension  Palpations: Abdomen is soft  There is no hepatomegaly or splenomegaly  Tenderness: There is no abdominal tenderness  Lymphadenopathy:      Cervical: Cervical adenopathy (Left submandibular lymph node measuring 1 5 cm, small cervical lymphadenopathy bilaterally up to 5 mm    Left axillary lymph node measuring about 3 cm, right inguinal lymph node measuring about 2 cm) present  Skin:     General: Skin is warm and dry  Coloration: Skin is not pale  Findings: No erythema  Neurological:      Mental Status: He is alert and oriented to person, place, and time  Psychiatric:         Behavior: Behavior normal          Thought Content: Thought content normal          Judgment: Judgment normal          ECO    Goals and Barriers:  Current Goal: Minimize effects of disease  Barriers: None  Patient's Capacity to Self Care:  Patient is able to self care      Code Status: [unfilled]

## 2021-02-22 ENCOUNTER — HOSPITAL ENCOUNTER (OUTPATIENT)
Dept: INFUSION CENTER | Facility: CLINIC | Age: 51
Discharge: HOME | End: 2021-02-22
Payer: COMMERCIAL

## 2021-02-22 VITALS — TEMPERATURE: 96.6 F

## 2021-02-22 DIAGNOSIS — C82.38 GRADE 3A FOLLICULAR LYMPHOMA OF LYMPH NODES OF MULTIPLE REGIONS (HCC): Primary | ICD-10-CM

## 2021-02-22 PROCEDURE — 96523 IRRIG DRUG DELIVERY DEVICE: CPT

## 2021-02-22 NOTE — PROGRESS NOTES
Pt resting with no complaints  Port accessed, flushed, saline-locked and deaccessed without issue  Pt declined AVS; aware to make more port flush appts on his way out as we don't have any more scheduled at this time

## 2021-03-22 PROBLEM — K64.9 BLEEDING HEMORRHOIDS: Status: ACTIVE | Noted: 2021-03-22

## 2021-04-05 ENCOUNTER — TELEPHONE (OUTPATIENT)
Dept: HEMATOLOGY ONCOLOGY | Facility: CLINIC | Age: 51
End: 2021-04-05

## 2021-04-05 NOTE — TELEPHONE ENCOUNTER
Scheduling Appointment     Who Is Calling to Schedule Patient    Doctor [id number] [facility]   Date and Time 06/22 at 8:00am         Patient verbalized understanding    yes

## 2021-04-23 DIAGNOSIS — Z91.89 AT RISK FOR OBSTRUCTIVE SLEEP APNEA: Primary | ICD-10-CM

## 2021-04-23 NOTE — PRE-PROCEDURE INSTRUCTIONS
INSTR  ON ASC LOC  ,BRING PHOTO ID/MED LIST/INS  INFO , SHOWER REV , NO ASA/NSAIDS/VIT 1 WEEK PREOP  Pre Procedure Consult Calls    1  Are you currently experiencing symptoms of fever >100 4, cough, or shortness of breath or sore throat? ______YES    ___x__NO   If yes, then please call your PCP immediately for further direction  If you are completing this form on site, please find the safest and most direct route to the nearest exit and avoid close contact with others until you can get further advice from your PCP  2  Have you recently traveled to any foreign country or area within the [place] that has reported cases of COVID-19? ______YES      ___x__NO   IF YES, LIST LOCATION(S): __________________________   3  Have you recently been in contact with someone who is a suspected or confirmed case of COVID-19?   ______ YES   _____x_ No   DOES NOT HAVE COVID VACCINE  POS COVID 12/19    INSTRUCTED ON NEW COVID VISITOR POLICY- ONLY 1 VISITOR, ALL MUST WEAR MASKS, TEMP WILL BE TAKEN @ DOOR  No outpatient medications have been marked as taking for the 4/27/21 encounter [facility] Encounter)

## 2021-04-26 ENCOUNTER — ANESTHESIA EVENT (OUTPATIENT)
Dept: PERIOP | Facility: HOSPITAL | Age: 51
End: 2021-04-26
Payer: COMMERCIAL

## 2021-04-27 ENCOUNTER — HOSPITAL ENCOUNTER (OUTPATIENT)
Facility: HOSPITAL | Age: 51
Setting detail: OUTPATIENT SURGERY
Discharge: HOME | End: 2021-04-27
Attending: COLON & RECTAL SURGERY | Admitting: COLON & RECTAL SURGERY
Payer: COMMERCIAL

## 2021-04-27 ENCOUNTER — ANESTHESIA (OUTPATIENT)
Dept: PERIOP | Facility: HOSPITAL | Age: 51
End: 2021-04-27
Payer: COMMERCIAL

## 2021-04-27 VITALS
HEART RATE: 65 BPM | WEIGHT: 210 LBS | TEMPERATURE: 97.3 F | SYSTOLIC BLOOD PRESSURE: 115 MMHG | DIASTOLIC BLOOD PRESSURE: 74 MMHG | HEIGHT: 73 IN | BODY MASS INDEX: 27.83 KG/M2 | OXYGEN SATURATION: 100 % | RESPIRATION RATE: 18 BRPM

## 2021-04-27 DIAGNOSIS — K64.9 BLEEDING HEMORRHOIDS: ICD-10-CM

## 2021-04-27 PROCEDURE — 46260 REMOVE IN/EX HEM GROUPS 2+: CPT | Performed by: COLON & RECTAL SURGERY

## 2021-04-27 PROCEDURE — C9290 INJ, BUPIVACAINE LIPOSOME: HCPCS | Performed by: COLON & RECTAL SURGERY

## 2021-04-27 PROCEDURE — 88304 TISSUE EXAM BY PATHOLOGIST: CPT | Performed by: PATHOLOGY

## 2021-04-27 RX ORDER — SODIUM CHLORIDE, SODIUM LACTATE, POTASSIUM CHLORIDE, CALCIUM CHLORIDE 600; 310; 30; 20 MG/100ML; MG/100ML; MG/100ML; MG/100ML
INJECTION, SOLUTION INTRAVENOUS CONTINUOUS PRN
Status: DISCONTINUED | OUTPATIENT
Start: 2021-04-27 | End: 2021-04-27

## 2021-04-27 RX ORDER — MAGNESIUM HYDROXIDE 1200 MG/15ML
LIQUID ORAL AS NEEDED
Status: DISCONTINUED | OUTPATIENT
Start: 2021-04-27 | End: 2021-04-27 | Stop reason: HOSPADM

## 2021-04-27 RX ORDER — SUCCINYLCHOLINE/SOD CL,ISO/PF 100 MG/5ML
SYRINGE (ML) INTRAVENOUS AS NEEDED
Status: DISCONTINUED | OUTPATIENT
Start: 2021-04-27 | End: 2021-04-27

## 2021-04-27 RX ORDER — MIDAZOLAM HYDROCHLORIDE 2 MG/2ML
INJECTION, SOLUTION INTRAMUSCULAR; INTRAVENOUS AS NEEDED
Status: DISCONTINUED | OUTPATIENT
Start: 2021-04-27 | End: 2021-04-27

## 2021-04-27 RX ORDER — ONDANSETRON 2 MG/ML
4 INJECTION INTRAMUSCULAR; INTRAVENOUS ONCE AS NEEDED
Status: DISCONTINUED | OUTPATIENT
Start: 2021-04-27 | End: 2021-04-27 | Stop reason: HOSPADM

## 2021-04-27 RX ORDER — PROPOFOL 10 MG/ML
INJECTION, EMULSION INTRAVENOUS AS NEEDED
Status: DISCONTINUED | OUTPATIENT
Start: 2021-04-27 | End: 2021-04-27

## 2021-04-27 RX ORDER — PROPOFOL 10 MG/ML
INJECTION, EMULSION INTRAVENOUS CONTINUOUS PRN
Status: DISCONTINUED | OUTPATIENT
Start: 2021-04-27 | End: 2021-04-27

## 2021-04-27 RX ORDER — FENTANYL CITRATE 50 UG/ML
INJECTION, SOLUTION INTRAMUSCULAR; INTRAVENOUS AS NEEDED
Status: DISCONTINUED | OUTPATIENT
Start: 2021-04-27 | End: 2021-04-27

## 2021-04-27 RX ORDER — ONDANSETRON 2 MG/ML
INJECTION INTRAMUSCULAR; INTRAVENOUS AS NEEDED
Status: DISCONTINUED | OUTPATIENT
Start: 2021-04-27 | End: 2021-04-27

## 2021-04-27 RX ORDER — DEXAMETHASONE SODIUM PHOSPHATE 10 MG/ML
INJECTION, SOLUTION INTRAMUSCULAR; INTRAVENOUS AS NEEDED
Status: DISCONTINUED | OUTPATIENT
Start: 2021-04-27 | End: 2021-04-27

## 2021-04-27 RX ORDER — KETOROLAC TROMETHAMINE 30 MG/ML
INJECTION, SOLUTION INTRAMUSCULAR; INTRAVENOUS AS NEEDED
Status: DISCONTINUED | OUTPATIENT
Start: 2021-04-27 | End: 2021-04-27

## 2021-04-27 RX ORDER — OXYCODONE HYDROCHLORIDE AND ACETAMINOPHEN 5; 325 MG/1; MG/1
1 TABLET ORAL EVERY 4 HOURS PRN
Qty: 20 TABLET | Refills: 0 | Status: SHIPPED | OUTPATIENT
Start: 2021-04-27 | End: 2021-05-02

## 2021-04-27 RX ORDER — SODIUM CHLORIDE, SODIUM LACTATE, POTASSIUM CHLORIDE, CALCIUM CHLORIDE 600; 310; 30; 20 MG/100ML; MG/100ML; MG/100ML; MG/100ML
100 INJECTION, SOLUTION INTRAVENOUS CONTINUOUS
Status: DISCONTINUED | OUTPATIENT
Start: 2021-04-27 | End: 2021-04-27 | Stop reason: HOSPADM

## 2021-04-27 RX ORDER — LIDOCAINE HYDROCHLORIDE 10 MG/ML
INJECTION, SOLUTION EPIDURAL; INFILTRATION; INTRACAUDAL; PERINEURAL AS NEEDED
Status: DISCONTINUED | OUTPATIENT
Start: 2021-04-27 | End: 2021-04-27

## 2021-04-27 RX ORDER — KETAMINE HCL IN NACL, ISO-OSM 100MG/10ML
SYRINGE (ML) INJECTION AS NEEDED
Status: DISCONTINUED | OUTPATIENT
Start: 2021-04-27 | End: 2021-04-27

## 2021-04-27 RX ORDER — BUPIVACAINE HYDROCHLORIDE AND EPINEPHRINE 2.5; 5 MG/ML; UG/ML
INJECTION, SOLUTION EPIDURAL; INFILTRATION; INTRACAUDAL; PERINEURAL AS NEEDED
Status: DISCONTINUED | OUTPATIENT
Start: 2021-04-27 | End: 2021-04-27 | Stop reason: HOSPADM

## 2021-04-27 RX ORDER — FENTANYL CITRATE/PF 50 MCG/ML
25 SYRINGE (ML) INJECTION
Status: DISCONTINUED | OUTPATIENT
Start: 2021-04-27 | End: 2021-04-27 | Stop reason: HOSPADM

## 2021-04-27 RX ORDER — HYDROMORPHONE HCL/PF 1 MG/ML
0.5 SYRINGE (ML) INJECTION
Status: DISCONTINUED | OUTPATIENT
Start: 2021-04-27 | End: 2021-04-27 | Stop reason: HOSPADM

## 2021-04-27 RX ORDER — OXYCODONE HYDROCHLORIDE AND ACETAMINOPHEN 5; 325 MG/1; MG/1
1 TABLET ORAL EVERY 4 HOURS PRN
Status: DISCONTINUED | OUTPATIENT
Start: 2021-04-27 | End: 2021-04-27 | Stop reason: HOSPADM

## 2021-04-27 RX ADMIN — SODIUM CHLORIDE, SODIUM LACTATE, POTASSIUM CHLORIDE, AND CALCIUM CHLORIDE: .6; .31; .03; .02 INJECTION, SOLUTION INTRAVENOUS at 09:23

## 2021-04-27 RX ADMIN — ONDANSETRON 4 MG: 2 INJECTION INTRAMUSCULAR; INTRAVENOUS at 08:20

## 2021-04-27 RX ADMIN — Medication 100 MG: at 08:07

## 2021-04-27 RX ADMIN — PROPOFOL 200 MG: 10 INJECTION, EMULSION INTRAVENOUS at 08:07

## 2021-04-27 RX ADMIN — Medication 30 MG: at 08:07

## 2021-04-27 RX ADMIN — MIDAZOLAM 2 MG: 1 INJECTION INTRAMUSCULAR; INTRAVENOUS at 08:02

## 2021-04-27 RX ADMIN — LIDOCAINE HYDROCHLORIDE 50 MG: 10 INJECTION, SOLUTION EPIDURAL; INFILTRATION; INTRACAUDAL; PERINEURAL at 08:07

## 2021-04-27 RX ADMIN — PROPOFOL 100 MCG/KG/MIN: 10 INJECTION, EMULSION INTRAVENOUS at 08:09

## 2021-04-27 RX ADMIN — KETOROLAC TROMETHAMINE 30 MG: 30 INJECTION, SOLUTION INTRAMUSCULAR at 09:04

## 2021-04-27 RX ADMIN — FENTANYL CITRATE 50 MCG: 50 INJECTION INTRAMUSCULAR; INTRAVENOUS at 08:32

## 2021-04-27 RX ADMIN — SODIUM CHLORIDE, SODIUM LACTATE, POTASSIUM CHLORIDE, AND CALCIUM CHLORIDE: .6; .31; .03; .02 INJECTION, SOLUTION INTRAVENOUS at 07:28

## 2021-04-27 RX ADMIN — DEXAMETHASONE SODIUM PHOSPHATE 10 MG: 10 INJECTION, SOLUTION INTRAMUSCULAR; INTRAVENOUS at 08:15

## 2021-04-27 NOTE — OP NOTE
OPERATIVE REPORT  PATIENT NAME: Camilo Denis    :  1970  MRN: [id number]  Pt Location: [facility] OR ROOM 10    SURGERY DATE: 2021    Surgeon(s) and Role:     * Latosha Otoole MD - Primary     * Elie Fu MD - Assisting    Preop Diagnosis:  Bleeding hemorrhoids [K64 9]    Post-Op Diagnosis Codes: * Bleeding hemorrhoids [K64 9]    Procedure(s) (LRB):  HEMORRHOIDALPEXY (THD) (N/A)  HEMORRHOIDECTOMY EXCISION (N/A) x 2 columns    Specimen(s):  ID Type Source Tests Collected by Time Destination   1 :  Tissue Hemorrhoids TISSUE EXAM Latosha Otoole MD 2021 0900        Estimated Blood Loss:   Minimal    Drains:  * No LDAs found *    Anesthesia Type:   IV Sedation with Anesthesia    Operative Indications:  Bleeding hemorrhoids [K64 9]    Operative Findings:  Hemorrhoids    Complications:   None    Procedure and Technique:  The patient was placed in a prone jack-knife position with the buttocks taped apart  The anal area was prepped using Betadine and draped in a sterile manner  A time-out was done  Anal inspection revealed redundant and engorged right posterior midline and right posterolateral external hemorrhoids  There was a small amount of prolapse of the internal hemorrhoids at the right posterior position  Anoscopic evaluation and digital exam revealed no other abnormalities  Prostate exam was unremarkable  There were no prostate nodules  Internal hemorrhoids were noted to be redundant circumferentially  However, the external hemorrhoidal redundancy was most pronounced at the right posterior and right posterolateral positions  Elected to proceed with the transanal hemorrhoidal pexy and de arterialization in the anterior positions, reassess, and then proceed with hemorrhoidectomy as needed  Cast[place]outh was performed at the left anterior and right anterior positions  The Cast[place] device was inserted and ultrasound was used to identify the arterial pulsation    That was the site chosen for placement of the original figure-of-eight suture using the device  A submucosal run was then carried to the distal end of the internal hemorrhoids  The suture was tied back upon itself to create the hemorrhoidal pexy at each site  We then reassessed and felt that the right lateral position was appropriate for [facility] was done at that site as well  Left lateral THD was also done  Reassessment at this time revealed that there were still redundant hemorrhoids posteriorly that did not appear to be amenable to simple THD alone  For that reason hemorrhoidectomy was performed  A right posterior hemorrhoidectomy was done using a Nielson retractor  Sharp, scissors dissection was carried from 1 cm caudal to the anal verge, proximally to the proximal extent of the internal hemorrhoids  The hemorrhoid was closed using running 0 Vicryl suture  The remaining right lobe posterolateral hemorrhoid was also present  A THD was done at that site internally and the external hemorrhoid was excised and closed primarily using 2 0 chromic suture  Finally, [facility] was done at the left posterior position  Reinspection after that was completed revealed no remaining disease that suggested need for any treatment  Internal inspection with anoscopy revealed no bleeding  Suture lines revealed no gaps  Exparel was injected into all the wounds for postoperative pain management  This was used postoperatively rather than before incision due to concern over disrupting our anatomy for creation of the [facility] pexy  Sponge needle and instrument counts were correct      I was present for the entire procedure    Patient Disposition:  PACU     SIGNATURE: Lui Gomez MD  DATE: April 27, 2021  TIME: 9:11 AM

## 2021-04-27 NOTE — PROGRESS NOTES
Called into OR12 and notified Dr Prem Escamilla of pt having bladder scan volume of 510ml  Pt bladder scanned bc he voided 50ml and was complaining of having to void and being unable to do so  Dr Prem Escamilla came over and saw pt  Pt to have another attempt to void on his own and if unsuccessful; pt is to be straight cathed once  Will monitor

## 2021-04-27 NOTE — ANESTHESIA PREPROCEDURE EVALUATION
Procedure:  HEMORRHOIDALPEXY (Castromouth) (N/A Anus)  HEMORRHOIDECTOMY EXCISION (N/A Anus)    Relevant Problems   CARDIO   (+) Bleeding hemorrhoids      GI/HEPATIC   (+) Bleeding hemorrhoids      HEMATOLOGY   (+) Grade 3a follicular lymphoma of lymph nodes of multiple regions [facility])        Physical Exam    Airway    Mallampati score: I  TM Distance: >3 FB  Neck ROM: full     Dental       Cardiovascular      Pulmonary      Other Findings        Anesthesia Plan  ASA Score- 2     Anesthesia Type- IV sedation with anesthesia with ASA Monitors  Additional Monitors:   Airway Plan:           Plan Factors-        Patient is not a current smoker  Patient did not smoke on day of surgery  Induction- intravenous  Postoperative Plan- Plan for postoperative opioid use  Informed Consent- Anesthetic plan and risks discussed with patient  I personally reviewed this patient with the CRNA  Discussed and agreed on the Anesthesia Plan with the CRNA  Milady Cunningham

## 2021-04-27 NOTE — ANESTHESIA POSTPROCEDURE EVALUATION
Post-Op Assessment Note    CV Status:  Stable    Pain management: adequate     Mental Status:  Sleepy and arousable   Hydration Status:  Euvolemic   PONV Controlled:  Controlled   Airway Patency:  Patent      Post Op Vitals Reviewed: Yes      Staff: CRNA, Anesthesiologist         No complications documented      /62 (04/27/21 0922)    Temp (!) 96 6 °F (35 9 °C) (04/27/21 0922)    Pulse 72 (04/27/21 0922)   Resp 17 (04/27/21 0922)    SpO2 98 % (04/27/21 0922)

## 2021-04-27 NOTE — H&P
History and Physical   Colon and Rectal Surgery   Temi Santiago 45 y o  male MRN: [id number]  Unit/Bed#: [facility] Encounter: [id number]  04/27/21   7:50 AM      CC:  Hemorrhoidal bleeding  History of Present Illness   HPI:  Temi Santiago is a 45 y o  male with hemorrhoids, for surgical treatment  Historical Information   Past Medical History:   Diagnosis Date    Lymph nodes enlarged     CT bx-retropertineum today 5/18/2017    Lymphoma ([address] )     follicular    Spleen enlarged     CT bx today retropertineum-5/18/2017     Past Surgical History:   Procedure Laterality Date    COLONOSCOPY      DENTAL SURGERY      LYMPH NODE BIOPSY Right 9/22/2020    Procedure: EXCISION BIOPSY LYMPH NODE INGUINAL, lymphoma protocol;  Surgeon: Kevin Suresh MD;  [facility];  Service: Surgical Oncology    NASAL SEPTUM SURGERY      PORTACATH PLACEMENT         Meds/Allergies     No medications prior to admission  No current facility-administered medications for this encounter  Facility-Administered Medications Ordered in Other Encounters:     lactated ringers infusion, , , Continuous PRN, Adaline Severo CRNA, New Bag at 04/27/21 5780    No Known Allergies      Social History   Social History     Substance and Sexual Activity   Alcohol Use Not Currently    Alcohol/week: 2 0 standard drinks    Types: 2 Glasses of wine per week    Comment: monthly     Social History     Substance and Sexual Activity   Drug Use No     Social History     Tobacco Use   Smoking Status Never Smoker   Smokeless Tobacco Never Used         Family History: History reviewed  No pertinent family history        Objective     Current Vitals:   Blood Pressure: 106/60 (04/27/21 0644)  Pulse: 72 (04/27/21 0644)  Temperature: 98 °F (36 7 °C) (04/27/21 0644)  Temp Source: Temporal (04/27/21 0644)  Respirations: 18 (04/27/21 0644)  Height: 6' 1" (185 4 cm) (04/27/21 9097)  Weight - Scale: 95 3 kg (210 lb) (04/27/21 0644)  SpO2: 98 % (04/27/21 4667)  No intake or output data in the 24 hours ending 04/27/21 0750    Physical Exam:  General:  Well nourished, no distress  Neuro: Alert and oriented  Eyes:Sclera anicteric, conjunctiva pink  Pulm: Clear to auscultation bilaterally  No respiratory Distress  CV:  Regular rate and rhythm  No murmurs  Abdomen:  Soft, flat, non-tender, without masses or hepatosplenomegaly  Lab Results:       ASSESSMENT:  Caffie Area is a 45 y o  male for hemorrhoid treatment  Transanal hemorrhoidopexy and dearteriolization vs hemorrhoidectomy are to be used  PLAN:  Risks, not limited to infection, bleeding, pain, persistent disease, need for future surgery, fecal incontinence, or nonhealing wounds were reviewed at length  Questions were answered     Lui Gomez MD

## 2021-05-23 ENCOUNTER — APPOINTMENT (EMERGENCY)
Dept: RADIOLOGY | Facility: HOSPITAL | Age: 51
End: 2021-05-23
Payer: COMMERCIAL

## 2021-05-23 ENCOUNTER — HOSPITAL ENCOUNTER (EMERGENCY)
Facility: HOSPITAL | Age: 51
Discharge: HOME | End: 2021-05-23
Attending: EMERGENCY MEDICINE
Payer: COMMERCIAL

## 2021-05-23 VITALS
SYSTOLIC BLOOD PRESSURE: 143 MMHG | TEMPERATURE: 98.1 F | RESPIRATION RATE: 18 BRPM | HEART RATE: 85 BPM | WEIGHT: 194.67 LBS | OXYGEN SATURATION: 99 % | BODY MASS INDEX: 25.68 KG/M2 | DIASTOLIC BLOOD PRESSURE: 77 MMHG
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
## 2021-05-23 DIAGNOSIS — S69.92XA INJURY OF FINGER OF LEFT HAND BY NAIL GUN, INITIAL ENCOUNTER: Primary | ICD-10-CM

## 2021-05-23 PROCEDURE — 20520 RMVL FB MUSC/TDN SIMPLE: CPT | Performed by: EMERGENCY MEDICINE

## 2021-05-23 PROCEDURE — 99284 EMERGENCY DEPT VISIT MOD MDM: CPT | Performed by: EMERGENCY MEDICINE

## 2021-05-23 PROCEDURE — 90471 IMMUNIZATION ADMIN: CPT

## 2021-05-23 PROCEDURE — 73140 X-RAY EXAM OF FINGER(S): CPT

## 2021-05-23 PROCEDURE — 90715 TDAP VACCINE 7 YRS/> IM: CPT | Performed by: EMERGENCY MEDICINE

## 2021-05-23 PROCEDURE — 99283 EMERGENCY DEPT VISIT LOW MDM: CPT

## 2021-05-23 RX ORDER — AMOXICILLIN AND CLAVULANATE POTASSIUM 875; 125 MG/1; MG/1
1 TABLET, FILM COATED ORAL ONCE
Status: COMPLETED | OUTPATIENT
Start: 2021-05-23 | End: 2021-05-23

## 2021-05-23 RX ORDER — LIDOCAINE HYDROCHLORIDE 10 MG/ML
10 INJECTION, SOLUTION EPIDURAL; INFILTRATION; INTRACAUDAL; PERINEURAL ONCE
Status: COMPLETED | OUTPATIENT
Start: 2021-05-23 | End: 2021-05-23

## 2021-05-23 RX ORDER — AMOXICILLIN AND CLAVULANATE POTASSIUM 875; 125 MG/1; MG/1
1 TABLET, FILM COATED ORAL EVERY 12 HOURS
Qty: 14 TABLET | Refills: 0 | Status: SHIPPED | OUTPATIENT
Start: 2021-05-23 | End: 2021-05-30

## 2021-05-23 RX ADMIN — TETANUS TOXOID, REDUCED DIPHTHERIA TOXOID AND ACELLULAR PERTUSSIS VACCINE, ADSORBED 0.5 ML: 5; 2.5; 8; 8; 2.5 SUSPENSION INTRAMUSCULAR at 12:00

## 2021-05-23 RX ADMIN — LIDOCAINE HYDROCHLORIDE 10 ML: 10 INJECTION, SOLUTION EPIDURAL; INFILTRATION; INTRACAUDAL; PERINEURAL at 11:50

## 2021-05-23 RX ADMIN — AMOXICILLIN AND CLAVULANATE POTASSIUM 1 TABLET: 875; 125 TABLET, FILM COATED ORAL at 12:46

## 2021-05-23 NOTE — DISCHARGE INSTRUCTIONS
Please follow-up with hand surgery  Return to ER have fevers, uncontrolled pain, difficulty moving finger or significant swelling or redness of finger  Use ibuprofen and Tylenol for pain as needed

## 2021-05-23 NOTE — ED PROVIDER NOTES
History  Chief Complaint   Patient presents with    Finger Injury     per pt "he was building a gate about 10mns ago and he got a nail stuck in his left ring finger "       History provided by:  Patient   used: No      Patient is a 25-year-old male presenting to emergency department with injury to his left 4 digit  He was building gait, accidentally shot his finger with a nail  Not on blood thinners  No other injuries  Decreased movement of the finger  Sensation intact  Last tetanus unknown  MDM x-ray, tetanus, does not want anything for pain right now    Case discussed with orthopedic surgeon/hand surgeon on-call, Dr Vandana Lane  Recommends pulling it out in the ER after doing a finger block  Does not believe bone is affected or any more injury can be done from pulling on it  Discussed the case with patient  Went over all risks and benefits, including injury to the nerves arteries veins ligaments and bones in the finger  He would like to go forward with the procedure  Finger was cleaned with Betadine  Ring block was done with 1% lidocaine  Total of 3 ml used  Nail was pulled straight out  No complications  Afterwards wound was washed thoroughly with water  Finger neurovascular intact distally  Patient will start antibiotics prophylactically  Will follow-up with hand surgery  Return precautions explained  Patient verbalized understanding of return precautions and importance of following up        None       Past Medical History:   Diagnosis Date    Lymph nodes enlarged     CT bx-retropertineum today 5/18/2017    Lymphoma ([address] )     follicular    Spleen enlarged     CT bx today retropertineum-5/18/2017       Past Surgical History:   Procedure Laterality Date    COLONOSCOPY      DENTAL SURGERY      LYMPH NODE BIOPSY Right 9/22/2020    Procedure: EXCISION BIOPSY LYMPH NODE INGUINAL, lymphoma protocol;  Surgeon: Stella Peña MD;  [facility];  Service: Surgical Oncology    NASAL SEPTUM SURGERY      PORTACATH PLACEMENT      MA HEMORRHOIDECTOMY,INT/EXT, 2+ COLUMNS/GROUPS N/A 4/27/2021    Procedure: HEMORRHOIDECTOMY EXCISION;  Surgeon: Keenan Davis MD;  [facility];  Service: Colorectal    MA HEMORRHOIDOPEXY BY STAPLING N/A 4/27/2021    Procedure: [facility]); Surgeon: Keenan Davis MD;  [facility];  Service: Colorectal       History reviewed  No pertinent family history  I have reviewed and agree with the history as documented  E-Cigarette/Vaping    E-Cigarette Use Never User      E-Cigarette/Vaping Substances     Social History     Tobacco Use    Smoking status: Never Smoker    Smokeless tobacco: Never Used   Substance Use Topics    Alcohol use: Not Currently     Alcohol/week: 2 0 standard drinks     Types: 2 Glasses of wine per week     Comment: monthly    Drug use: No       Review of Systems   Constitutional: Negative for chills, diaphoresis and fever  HENT: Negative for congestion and sore throat  Respiratory: Negative for cough, shortness of breath, wheezing and stridor  Cardiovascular: Negative for chest pain, palpitations and leg swelling  Gastrointestinal: Negative for abdominal pain, blood in stool, diarrhea, nausea and vomiting  Genitourinary: Negative for dysuria, frequency and urgency  Musculoskeletal: Negative for neck pain and neck stiffness  Skin: Negative for pallor and rash  Neurological: Negative for dizziness, syncope, weakness, light-headedness and headaches  All other systems reviewed and are negative  Physical Exam  Physical Exam  Vitals signs reviewed  Constitutional:       Appearance: Normal appearance  He is well-developed  HENT:      Head: Normocephalic and atraumatic  Eyes:      Extraocular Movements: Extraocular movements intact  Pupils: Pupils are equal, round, and reactive to light  Neck:      Musculoskeletal: Neck supple     Cardiovascular: Rate and Rhythm: Normal rate and regular rhythm  Pulmonary:      Effort: Pulmonary effort is normal  No respiratory distress  Musculoskeletal:         General: Tenderness and signs of injury present  Comments: Large nail through the 4th digit on the left  Sensation intact distally  Cap refill normal   Range of motion limited due to the nail   Skin:     General: Skin is warm and dry  Neurological:      General: No focal deficit present  Mental Status: He is alert and oriented to person, place, and time  Vital Signs  ED Triage Vitals   Temperature Pulse Respirations Blood Pressure SpO2   05/23/21 1042 05/23/21 1041 05/23/21 1041 05/23/21 1041 05/23/21 1041   98 1 °F (36 7 °C) 85 18 143/77 99 %      Temp Source Heart Rate Source Patient Position - Orthostatic VS BP Location FiO2 (%)   05/23/21 1041 05/23/21 1041 05/23/21 1041 05/23/21 1041 --   Oral Monitor Lying Right arm       Pain Score       05/23/21 1041       5           Vitals:    05/23/21 1041   BP: 143/77   Pulse: 85   Patient Position - Orthostatic VS: Lying         Visual Acuity      ED Medications  Medications   tetanus-diphtheria-acellular pertussis (BOOSTRIX) IM injection 0 5 mL (0 5 mL Intramuscular Given 5/23/21 1200)   lidocaine (PF) (XYLOCAINE-MPF) 1 % injection 10 mL (10 mL Infiltration Given 5/23/21 1150)   amoxicillin-clavulanate (AUGMENTIN) 875-125 mg per tablet 1 tablet (1 tablet Oral Given 5/23/21 1246)       Diagnostic Studies  Results Reviewed     None                 XR finger fourth digit-ring LEFT   ED Interpretation by Suzanna Obando MD (05/23 1233)   No fracture noted post nail removal      XR finger fourth digit - ring LEFT   Final Result by Ju Ibrahim MD (05/23 1131)      Large nail passes through the end of the 4th digit at the level of the distal interphalangeal joint  I believe it is within the soft tissues ventrally and not within the bone although it is difficult to exclude the latter        There is a kassie along the shaft of the nail which appears to be embedded within the patient            Workstation performed: [id number]                    Procedures  Procedures         ED Course                             SBIRT 22yo+      Most Recent Value   SBIRT (24 yo +)   In order to provide better care to our patients, we are screening all of our patients for alcohol and drug use  Would it be okay to ask you these screening questions? No Filed at: 05/23/2021 1057                    MDM    Disposition  Final diagnoses:   Injury of finger of left hand by nail gun, initial encounter - Left 4th finger     Time reflects when diagnosis was documented in both MDM as applicable and the Disposition within this note     Time User Action Codes Description Comment    5/23/2021 12:49 PM Phyliss Cage Add [G20 97FU,  W29  4XXA] Injury of finger of left hand by nail gun, initial encounter     5/23/2021 12:49 PM Phyliss Cage Modify [K32 24CY,  W29  4XXA] Injury of finger of left hand by nail gun, initial encounter Left 4th finger      ED Disposition     ED Disposition Condition Date/Time Comment    Discharge Stable Sun May 23, 2021 12:48 PM Temi Santiago discharge to home/self care              Follow-up Information     Follow up With Specialties Details Why Contact Info Additional Information    Isaac Manuel MD Internal Medicine  As needed, If symptoms worsen [address] [facility] [address]  Rami[facility] S[facility]chez 1 Emergency Department Emergency Medicine  As needed, If symptoms worsen [address] Emergency Department, [address] [place], [place], [address] Gatito Moeller MD Orthopedic Surgery, Hand Surgery, Orthopedics Schedule an appointment as soon as possible for a visit  Nail gun injury to finger [address] [facility] [address]  [phone] Discharge Medication List as of 5/23/2021 12:51 PM      START taking these medications    Details   amoxicillin-clavulanate (AUGMENTIN) 875-125 mg per tablet Take 1 tablet by mouth every 12 (twelve) hours for 7 days, Starting Sun 5/23/2021, Until Sun 5/30/2021, Normal           No discharge procedures on file      PDMP Review       Value Time User    PDMP Reviewed  Yes 9/14/2020  9:46 AM Dontae Tomlinson MD          ED Provider  Electronically Signed by           Tj Alanis MD  05/23/21 [phone]

## 2021-06-18 ENCOUNTER — HOSPITAL ENCOUNTER (OUTPATIENT)
Dept: INFUSION CENTER | Facility: CLINIC | Age: 51
Discharge: HOME | End: 2021-06-18
Payer: COMMERCIAL

## 2021-06-18 VITALS — TEMPERATURE: 98 F

## 2021-06-18 DIAGNOSIS — C82.38 GRADE 3A FOLLICULAR LYMPHOMA OF LYMPH NODES OF MULTIPLE REGIONS (HCC): Primary | ICD-10-CM

## 2021-06-18 LAB
ALBUMIN SERPL BCP-MCNC: 3.6 G/DL (ref 3.5–5)
ALP SERPL-CCNC: 103 U/L (ref 46–116)
ALT SERPL W P-5'-P-CCNC: 26 U/L (ref 12–78)
ANION GAP SERPL CALCULATED.3IONS-SCNC: 9 MMOL/L (ref 4–13)
AST SERPL W P-5'-P-CCNC: 20 U/L (ref 5–45)
BASOPHILS # BLD AUTO: 0.06 THOUSANDS/ΜL (ref 0–0.1)
BASOPHILS NFR BLD AUTO: 1 % (ref 0–1)
BILIRUB SERPL-MCNC: 0.86 MG/DL (ref 0.2–1)
BUN SERPL-MCNC: 14 MG/DL (ref 5–25)
CALCIUM SERPL-MCNC: 8.6 MG/DL (ref 8.3–10.1)
CHLORIDE SERPL-SCNC: 107 MMOL/L (ref 100–108)
CO2 SERPL-SCNC: 27 MMOL/L (ref 21–32)
CREAT SERPL-MCNC: 0.78 MG/DL (ref 0.6–1.3)
EOSINOPHIL # BLD AUTO: 0.24 THOUSAND/ΜL (ref 0–0.61)
EOSINOPHIL NFR BLD AUTO: 3 % (ref 0–6)
ERYTHROCYTE [DISTWIDTH] IN BLOOD BY AUTOMATED COUNT: 14.3 % (ref 11.6–15.1)
GFR SERPL CREATININE-BSD FRML MDRD: 104 ML/MIN/1.73SQ M
GLUCOSE SERPL-MCNC: 88 MG/DL (ref 65–140)
HCT VFR BLD AUTO: 41.7 % (ref 36.5–49.3)
HGB BLD-MCNC: 13.8 G/DL (ref 12–17)
IMM GRANULOCYTES # BLD AUTO: 0.03 THOUSAND/UL (ref 0–0.2)
IMM GRANULOCYTES NFR BLD AUTO: 0 % (ref 0–2)
LDH SERPL-CCNC: 137 U/L (ref 81–234)
LYMPHOCYTES # BLD AUTO: 1.63 THOUSANDS/ΜL (ref 0.6–4.47)
LYMPHOCYTES NFR BLD AUTO: 21 % (ref 14–44)
MCH RBC QN AUTO: 27.7 PG (ref 26.8–34.3)
MCHC RBC AUTO-ENTMCNC: 33.1 G/DL (ref 31.4–37.4)
MCV RBC AUTO: 84 FL (ref 82–98)
MONOCYTES # BLD AUTO: 0.73 THOUSAND/ΜL (ref 0.17–1.22)
MONOCYTES NFR BLD AUTO: 9 % (ref 4–12)
NEUTROPHILS # BLD AUTO: 5.04 THOUSANDS/ΜL (ref 1.85–7.62)
NEUTS SEG NFR BLD AUTO: 66 % (ref 43–75)
NRBC BLD AUTO-RTO: 0 /100 WBCS
PLATELET # BLD AUTO: 157 THOUSANDS/UL (ref 149–390)
PMV BLD AUTO: 10 FL (ref 8.9–12.7)
POTASSIUM SERPL-SCNC: 4.1 MMOL/L (ref 3.5–5.3)
PROT SERPL-MCNC: 6.3 G/DL (ref 6.4–8.2)
RBC # BLD AUTO: 4.98 MILLION/UL (ref 3.88–5.62)
SODIUM SERPL-SCNC: 143 MMOL/L (ref 136–145)
WBC # BLD AUTO: 7.73 THOUSAND/UL (ref 4.31–10.16)

## 2021-06-18 PROCEDURE — 83615 LACTATE (LD) (LDH) ENZYME: CPT

## 2021-06-18 PROCEDURE — 80053 COMPREHEN METABOLIC PANEL: CPT

## 2021-06-18 PROCEDURE — 85025 COMPLETE CBC W/AUTO DIFF WBC: CPT

## 2021-06-22 ENCOUNTER — OFFICE VISIT (OUTPATIENT)
Dept: HEMATOLOGY ONCOLOGY | Facility: CLINIC | Age: 51
End: 2021-06-22
Payer: COMMERCIAL

## 2021-06-22 VITALS
OXYGEN SATURATION: 95 % | HEIGHT: 73 IN | SYSTOLIC BLOOD PRESSURE: 100 MMHG | RESPIRATION RATE: 18 BRPM | WEIGHT: 206.2 LBS | HEART RATE: 75 BPM | DIASTOLIC BLOOD PRESSURE: 62 MMHG | BODY MASS INDEX: 27.33 KG/M2 | TEMPERATURE: 97.6 F

## 2021-06-22 DIAGNOSIS — C82.38 GRADE 3A FOLLICULAR LYMPHOMA OF LYMPH NODES OF MULTIPLE REGIONS (HCC): Primary | ICD-10-CM

## 2021-06-22 PROCEDURE — 3008F BODY MASS INDEX DOCD: CPT | Performed by: PHYSICIAN ASSISTANT

## 2021-06-22 PROCEDURE — 1036F TOBACCO NON-USER: CPT | Performed by: PHYSICIAN ASSISTANT

## 2021-06-22 PROCEDURE — 99214 OFFICE O/P EST MOD 30 MIN: CPT | Performed by: PHYSICIAN ASSISTANT

## 2021-06-22 NOTE — PROGRESS NOTES
Hematology/Oncology Outpatient Follow- up Note  Trupti Munoz 45 y o  male MRN: @ Encounter: [id number]        Date:  6/22/2021      Assessment / Plan:    76-year-old  male with stage IV follicular lymphoma grade 3B when he presented with weight loss, splenomegaly, constitutional symptoms, multiple lymphadenopathy above and below the diaphragm  Bone marrow biopsy showed 10% involvement with follicular lymphoma and also excisional biopsy of the retroperitoneal lymph node confirmed the diagnosis  He was treated with R-CHOP for total of 6 cycles spanning from May 2017 until September 2017  Subsequent PET scan showed no evidence of disease and no evidence of splenomegaly or lymphadenopathy     He received maintenance rituximab 375 milligram/meter squared every 2 months for total of 2 years finished in October 2019      Relapse of disease by PET scan in September 2020 which showed innumerable bilateral cervical, axillary, mediastinal, retroperitoneal, bilateral iliac, inguinal lymph nodes, splenic involvement      Excisional biopsy of a right inguinal lymph node 9/22/20 showed relapsed follicular lymphoma stage IIIA, no evidence of transformation, positive for CD 19, CD 20, CD 10, Ki-67 about 60%     Normal LDH     He is asymptomatic, he is on watchful observation  Physical examination identified small bilateral axillary and larger bilateral inguinal adenopathy  Clinically he is doing very well  Continue watchful observation  He will see Dr Vane Palafox in October and will have labs and imaging  Request follow-up in December - January in our office  Call if any issues or concerns             HPI:    Ranjan Nieto a 45 y  o  male with follicular lymphoma      Stage IV follicular lymphoma grade 3B when he presented with splenomegaly, weight loss, constitutional symptoms, multiple lymphadenopathy above and below the diaphragm, bone marrow biopsy showed 10 percent involvement with follicular lymphoma, excision biopsy of 1 of the retroperitoneal lymph node confirmed the diagnosis     No evidence of transformation, 2nd opinion at [address] agreed on treatment with R-CHOP     He received 6 cycles of R-CHOP spanning from May 2017 until September 2017 subsequent PET scan showed no evidence of disease and physical examination showed disappearance of splenomegaly and lymphadenopathy     Maintenance rituximab 375 milligram/meter squared every 2 months for total of 2 years finished in October 2019     PET scan in January 2020 showed uptake in the right inguinal area however patient did not have any constitutional symptoms, he has normal CBC, CMP, LDH we decided to watch and observe, at that time physical examination showed 1 5 cm left inguinal lymph node     Repeat PET scan in March 2020 showed uptake with SUV between 3 and 5 in the mediastinum, right perihilar area, no evidence of uptake in the abdomen or pelvis specially no uptake in the left inguinal area  In June 2020 cough intermittent without sputum production especially in the midthoracic area, denies any constitutional symptoms, CBC, CMP, LDH are normal     Repeat PET scan on 09/03/2020 showed progression of disease with innumerable new lymphadenopathy throughout the neck, chest, retroperitoneal, iliac, inguinal, splenic uptake, no evidence of bulky disease with score of 5, mild increase in the spleen size        Status post excision biopsy of the right inguinal lymph node on 09/22/2020  A  Right inguinal lymph node:  - Follicular lymphoma, follicular pattern, grade 3A (average of 17 centroblasts/HPF) - see Note     - Flow cytometry ([facility]#[id number], evaluated by Dr Dima Matos) reveals:   * CD10+ B-cell lymphoma, comprising 70% of total cells analyzed, of small to medium size with following immunophenotype:    -- positive: CD19, CD20, CD10, CD38, sKappa    -- negative: sLambda, CD5, CD11c, CD23   * Viability 7AAD: 89%    * The following antigens were evaluated & found to be expressed as described above or by appropriate cells:  CD2, CD3, CD4, CD5, CD7, CD8, CD10, CD11c, CD19, CD20, CD23, CD38, CD45, CD56, CD57, FMC7, sKappa, sLambda      There was no evidence of transformation, he was kept on watchful observation     COVID-19 infection in December 2020 with full recovery    Interval History:  Feels very well  Test Results:        Labs:   Lab Results   Component Value Date    HGB 13 8 06/18/2021    HCT 41 7 06/18/2021    MCV 84 06/18/2021     06/18/2021    WBC 7 73 06/18/2021    NRBC 0 06/18/2021    ATYLMPCT 4 (H) 10/02/2017     Lab Results   Component Value Date     08/16/2017    K 4 1 06/18/2021     06/18/2021    CO2 27 06/18/2021    BUN 14 06/18/2021    CREATININE 0 78 06/18/2021    GLUF 106 (H) 09/29/2017    CALCIUM 8 6 06/18/2021    CORRECTEDCA 9 2 12/30/2020    AST 20 06/18/2021    ALT 26 06/18/2021    ALKPHOS 103 06/18/2021    PROT 6 3 08/16/2017    BILITOT 0 5 08/16/2017    EGFR 104 06/18/2021       Imaging: XR finger fourth digit-ring LEFT    Result Date: 5/23/2021  Narrative: LEFT FINGER INDICATION:   post nail removal  COMPARISON:  None VIEWS:  XR FINGER LEFT FOURTH DIGIT-RING For the purposes of institution wide universal language the following terms will apply: (thumb=1st digit/finger, index finger=2nd digit/finger, long finger=3rd digit/finger, ring=4th digit/finger and small finger=5th digit/finger) FINDINGS: 4th mid phalanx lateral view, distal nondisplaced hairline cortical fracture  No significant degenerative changes  No lytic or blastic osseous lesion  Distal soft tissue injury  Impression: 4th mid phalanx nondisplaced hairline cortical fracture The study was marked in EPIC for immediate notification  Workstation performed: [id number]     XR finger fourth digit - ring LEFT    Result Date: 5/23/2021  Narrative: LEFT FINGER INDICATION:   trauma   COMPARISON:  None VIEWS:  XR FINGER LEFT FOURTH DIGIT-RING For the purposes of institution wide universal language the following terms will apply: (thumb=1st digit/finger, index finger=2nd digit/finger, long finger=3rd digit/finger, ring=4th digit/finger and small finger=5th digit/finger) FINDINGS: There is a nail which passes through the 4th digit at the level of the distal interphalangeal joint  As near as can be determined, it is within the soft tissues along the ventral side of the finger and I do not see convincing evidence of fracture or osseous injury although due to the inability to see all of the underlying bony anatomy through the radiopaque nail, I could not entirely exclude a bony injury  Of note, there is a kassie along the shaft of the nail which I believe is probably within the patient  There is a ring around the 4th digit overlying some of the proximal phalanx  The remainder of the hand is unremarkable  No significant degenerative changes  No lytic or blastic osseous lesion  Impression: Large nail passes through the end of the 4th digit at the level of the distal interphalangeal joint  I believe it is within the soft tissues ventrally and not within the bone although it is difficult to exclude the latter  There is a kassie along the shaft of the nail which appears to be embedded within the patient Workstation performed: [id number]           ROS:  As mentioned in HPI & Interval History otherwise 14 point ROS negative          Allergies: No Known Allergies  Current Medications: Reviewed  PMH/FH/SH:  Reviewed      Physical Exam:    2 17 meters squared    Ht Readings from Last 3 Encounters:   06/22/21 6' 1" (1 854 m)   06/07/21 6' 1" (1 854 m)   04/27/21 6' 1" (1 854 m)        Wt Readings from Last 3 Encounters:   06/07/21 93 kg (205 lb)   05/23/21 88 3 kg (194 lb 10 7 oz)   04/27/21 95 3 kg (210 lb)        Temp Readings from Last 3 Encounters:   06/18/21 98 °F (36 7 °C) (Temporal)   05/23/21 98 1 °F (36 7 °C)   04/27/21 (!) 97 3 °F (36 3 °C) (Temporal) BP Readings from Last 3 Encounters:   21 143/77   21 115/74   21 104/60           Physical Exam  Vitals reviewed  Constitutional:       General: He is not in acute distress  Appearance: He is well-developed  He is not diaphoretic  HENT:      Head: Normocephalic and atraumatic  Eyes:      Conjunctiva/sclera: Conjunctivae normal    Neck:      Trachea: No tracheal deviation  Cardiovascular:      Rate and Rhythm: Normal rate and regular rhythm  Heart sounds: No murmur heard  No friction rub  No gallop  Pulmonary:      Effort: Pulmonary effort is normal  No respiratory distress  Breath sounds: Normal breath sounds  No wheezing or rales  Chest:      Chest wall: No tenderness  Abdominal:      General: There is no distension  Palpations: Abdomen is soft  Tenderness: There is no abdominal tenderness  Musculoskeletal:      Cervical back: Normal range of motion and neck supple  Lymphadenopathy:      Cervical: No cervical adenopathy  Upper Body:      Right upper body: Axillary adenopathy present  Left upper body: Axillary adenopathy present  Lower Body: Right inguinal adenopathy present  Left inguinal adenopathy present  Skin:     General: Skin is warm and dry  Coloration: Skin is not pale  Findings: No erythema  Neurological:      Mental Status: He is alert and oriented to person, place, and time  Psychiatric:         Behavior: Behavior normal          Thought Content: Thought content normal          Judgment: Judgment normal          ECO      Emergency Contacts:     Ronnie Shin [address], [phone],

## 2021-06-29 ENCOUNTER — IMMUNIZATIONS (OUTPATIENT)
Dept: FAMILY MEDICINE CLINIC | Facility: HOSPITAL | Age: 51
End: 2021-06-29

## 2021-06-29 DIAGNOSIS — Z23 ENCOUNTER FOR IMMUNIZATION: Primary | ICD-10-CM

## 2021-06-29 PROCEDURE — 91300 SARS-COV-2 / COVID-19 MRNA VACCINE (PFIZER-BIONTECH) 30 MCG: CPT

## 2021-06-29 PROCEDURE — 0001A SARS-COV-2 / COVID-19 MRNA VACCINE (PFIZER-BIONTECH) 30 MCG: CPT

## 2021-08-10 ENCOUNTER — IMMUNIZATIONS (OUTPATIENT)
Dept: FAMILY MEDICINE CLINIC | Facility: HOSPITAL | Age: 51
End: 2021-08-10

## 2021-08-10 DIAGNOSIS — Z23 ENCOUNTER FOR IMMUNIZATION: Primary | ICD-10-CM

## 2021-08-10 PROCEDURE — 91300 SARS-COV-2 / COVID-19 MRNA VACCINE (PFIZER-BIONTECH) 30 MCG: CPT

## 2021-08-10 PROCEDURE — 0002A SARS-COV-2 / COVID-19 MRNA VACCINE (PFIZER-BIONTECH) 30 MCG: CPT

## 2021-08-18 ENCOUNTER — TELEPHONE (OUTPATIENT)
Dept: INFUSION CENTER | Facility: CLINIC | Age: 51
End: 2021-08-18

## 2021-08-19 ENCOUNTER — HOSPITAL ENCOUNTER (OUTPATIENT)
Dept: INFUSION CENTER | Facility: CLINIC | Age: 51
Discharge: HOME | End: 2021-08-19
Payer: COMMERCIAL

## 2021-08-19 DIAGNOSIS — C82.38 GRADE 3A FOLLICULAR LYMPHOMA OF LYMPH NODES OF MULTIPLE REGIONS (HCC): Primary | ICD-10-CM

## 2021-08-19 PROCEDURE — 96523 IRRIG DRUG DELIVERY DEVICE: CPT

## 2021-08-19 NOTE — PROGRESS NOTES
Patient presents for port flush  Port accessed without difficulty, flushes and aspirates freely  Port saline locked and de-accessed  Patient aware of next appointment  AVS declined

## 2021-11-19 ENCOUNTER — TELEPHONE (OUTPATIENT)
Dept: HEMATOLOGY ONCOLOGY | Facility: CLINIC | Age: 51
End: 2021-11-19

## 2021-12-23 ENCOUNTER — HOSPITAL ENCOUNTER (OUTPATIENT)
Dept: INFUSION CENTER | Facility: CLINIC | Age: 51
Discharge: HOME | End: 2021-12-23
Payer: COMMERCIAL

## 2021-12-23 DIAGNOSIS — C82.38 GRADE 3A FOLLICULAR LYMPHOMA OF LYMPH NODES OF MULTIPLE REGIONS (HCC): Primary | ICD-10-CM

## 2021-12-23 PROCEDURE — 96523 IRRIG DRUG DELIVERY DEVICE: CPT

## 2022-02-23 DIAGNOSIS — C82.38 GRADE 3A FOLLICULAR LYMPHOMA OF LYMPH NODES OF MULTIPLE REGIONS (HCC): Primary | ICD-10-CM

## 2022-02-23 NOTE — PROGRESS NOTES
Received call from pt asking if he needs labs prior to follow up  Orders placed, as last labs done June 2021

## 2022-02-24 ENCOUNTER — HOSPITAL ENCOUNTER (OUTPATIENT)
Dept: INFUSION CENTER | Facility: CLINIC | Age: 52
Discharge: HOME | End: 2022-02-24
Payer: COMMERCIAL

## 2022-02-24 DIAGNOSIS — C82.38 GRADE 3A FOLLICULAR LYMPHOMA OF LYMPH NODES OF MULTIPLE REGIONS (HCC): Primary | ICD-10-CM

## 2022-02-24 LAB
ALBUMIN SERPL BCP-MCNC: 3.6 G/DL (ref 3.5–5)
ALP SERPL-CCNC: 101 U/L (ref 46–116)
ALT SERPL W P-5'-P-CCNC: 21 U/L (ref 12–78)
ANION GAP SERPL CALCULATED.3IONS-SCNC: 9 MMOL/L (ref 4–13)
AST SERPL W P-5'-P-CCNC: 17 U/L (ref 5–45)
BASOPHILS # BLD AUTO: 0.07 THOUSANDS/ΜL (ref 0–0.1)
BASOPHILS NFR BLD AUTO: 1 % (ref 0–1)
BILIRUB SERPL-MCNC: 0.67 MG/DL (ref 0.2–1)
BUN SERPL-MCNC: 14 MG/DL (ref 5–25)
CALCIUM SERPL-MCNC: 8.7 MG/DL (ref 8.3–10.1)
CHLORIDE SERPL-SCNC: 106 MMOL/L (ref 100–108)
CO2 SERPL-SCNC: 27 MMOL/L (ref 21–32)
CREAT SERPL-MCNC: 0.86 MG/DL (ref 0.6–1.3)
EOSINOPHIL # BLD AUTO: 0.2 THOUSAND/ΜL (ref 0–0.61)
EOSINOPHIL NFR BLD AUTO: 3 % (ref 0–6)
ERYTHROCYTE [DISTWIDTH] IN BLOOD BY AUTOMATED COUNT: 13.6 % (ref 11.6–15.1)
GFR SERPL CREATININE-BSD FRML MDRD: 99 ML/MIN/1.73SQ M
GLUCOSE SERPL-MCNC: 108 MG/DL (ref 65–140)
HCT VFR BLD AUTO: 40.9 % (ref 36.5–49.3)
HGB BLD-MCNC: 13.7 G/DL (ref 12–17)
IMM GRANULOCYTES # BLD AUTO: 0.03 THOUSAND/UL (ref 0–0.2)
IMM GRANULOCYTES NFR BLD AUTO: 1 % (ref 0–2)
LDH SERPL-CCNC: 120 U/L (ref 81–234)
LYMPHOCYTES # BLD AUTO: 1.09 THOUSANDS/ΜL (ref 0.6–4.47)
LYMPHOCYTES NFR BLD AUTO: 16 % (ref 14–44)
MCH RBC QN AUTO: 28.7 PG (ref 26.8–34.3)
MCHC RBC AUTO-ENTMCNC: 33.5 G/DL (ref 31.4–37.4)
MCV RBC AUTO: 86 FL (ref 82–98)
MONOCYTES # BLD AUTO: 0.61 THOUSAND/ΜL (ref 0.17–1.22)
MONOCYTES NFR BLD AUTO: 9 % (ref 4–12)
NEUTROPHILS # BLD AUTO: 4.65 THOUSANDS/ΜL (ref 1.85–7.62)
NEUTS SEG NFR BLD AUTO: 70 % (ref 43–75)
NRBC BLD AUTO-RTO: 0 /100 WBCS
PLATELET # BLD AUTO: 157 THOUSANDS/UL (ref 149–390)
PMV BLD AUTO: 9.8 FL (ref 8.9–12.7)
POTASSIUM SERPL-SCNC: 4.1 MMOL/L (ref 3.5–5.3)
PROT SERPL-MCNC: 6.1 G/DL (ref 6.4–8.2)
RBC # BLD AUTO: 4.77 MILLION/UL (ref 3.88–5.62)
SODIUM SERPL-SCNC: 142 MMOL/L (ref 136–145)
WBC # BLD AUTO: 6.65 THOUSAND/UL (ref 4.31–10.16)

## 2022-02-24 PROCEDURE — 85025 COMPLETE CBC W/AUTO DIFF WBC: CPT | Performed by: INTERNAL MEDICINE

## 2022-02-24 PROCEDURE — 80053 COMPREHEN METABOLIC PANEL: CPT | Performed by: INTERNAL MEDICINE

## 2022-02-24 PROCEDURE — 83615 LACTATE (LD) (LDH) ENZYME: CPT | Performed by: INTERNAL MEDICINE

## 2022-02-24 NOTE — PROGRESS NOTES
Pt here for central labs, offers no complaints  Labs drawn and sent to the lab  Port flushed per protocol  Pt scheduled next port flush appointment upon discharge  Ronda Farmer

## 2022-03-01 ENCOUNTER — OFFICE VISIT (OUTPATIENT)
Dept: HEMATOLOGY ONCOLOGY | Facility: CLINIC | Age: 52
End: 2022-03-01
Payer: COMMERCIAL

## 2022-03-01 VITALS
SYSTOLIC BLOOD PRESSURE: 130 MMHG | WEIGHT: 207 LBS | HEART RATE: 72 BPM | TEMPERATURE: 98 F | RESPIRATION RATE: 17 BRPM | HEIGHT: 73 IN | DIASTOLIC BLOOD PRESSURE: 80 MMHG | BODY MASS INDEX: 27.43 KG/M2 | OXYGEN SATURATION: 97 %

## 2022-03-01 DIAGNOSIS — C82.38 GRADE 3A FOLLICULAR LYMPHOMA OF LYMPH NODES OF MULTIPLE REGIONS (HCC): Primary | ICD-10-CM

## 2022-03-01 PROCEDURE — 3008F BODY MASS INDEX DOCD: CPT | Performed by: INTERNAL MEDICINE

## 2022-03-01 PROCEDURE — 1036F TOBACCO NON-USER: CPT | Performed by: INTERNAL MEDICINE

## 2022-03-01 PROCEDURE — 99213 OFFICE O/P EST LOW 20 MIN: CPT | Performed by: INTERNAL MEDICINE

## 2022-03-01 NOTE — PROGRESS NOTES
Hematology Outpatient Follow - Up Note  Elías Sloan 47 y o  male MRN: @ Encounter: [id number]        Date:  3/1/2022        Assessment/ Plan:    59-year-old  male with stage IV follicular lymphoma grade 3B when he presented with weight loss, splenomegaly, constitutional symptoms, multiple lymphadenopathy above and below the diaphragm  Bone marrow biopsy showed 10% involvement with follicular lymphoma and also excisional biopsy of the retroperitoneal lymph node confirmed the diagnosis  He was treated with R-CHOP for total of 6 cycles spanning from May 2017 until September 2017  Subsequent PET scan showed no evidence of disease and no evidence of splenomegaly or lymphadenopathy     He received maintenance rituximab 375 milligram/meter squared every 2 months for total of 2 years finished in October 2019      Relapse of disease by PET scan in September 2020 which showed innumerable bilateral cervical, axillary, mediastinal, retroperitoneal, bilateral iliac, inguinal lymph nodes, splenic involvement      Excisional biopsy of a right inguinal lymph node 9/22/20 showed relapsed follicular lymphoma stage IIIA, no evidence of transformation, positive for CD 19, CD 20, CD 10, Ki-67 about 60%     Normal LDH     He is asymptomatic, he is on watchful observation  Again normal LDH, physical examination does not show any inguinal, axillary lymphadenopathy, continue watchful observation and follow-up in 6 months with CBC, CMP, LDH, he declined imaging studies        Labs and imaging studies are reviewed by ordering provider once results are available  If there are findings that need immediate attention, you will be contacted when results available  Discussing results and the implication on your healthcare is best discussed in person at your follow-up visit  HPI:    Theodora Xiong a 47 y  o  male with follicular lymphoma      Stage IV follicular lymphoma grade 3B when he presented with splenomegaly, weight loss, constitutional symptoms, multiple lymphadenopathy above and below the diaphragm, bone marrow biopsy showed 10 percent involvement with follicular lymphoma, excision biopsy of 1 of the retroperitoneal lymph node confirmed the diagnosis     No evidence of transformation, 2nd opinion at [address] agreed on treatment with R-CHOP     He received 6 cycles of R-CHOP spanning from May 2017 until September 2017 subsequent PET scan showed no evidence of disease and physical examination showed disappearance of splenomegaly and lymphadenopathy     Maintenance rituximab 375 milligram/meter squared every 2 months for total of 2 years finished in October 2019     PET scan in January 2020 showed uptake in the right inguinal area however patient did not have any constitutional symptoms, he has normal CBC, CMP, LDH we decided to watch and observe, at that time physical examination showed 1 5 cm left inguinal lymph node     Repeat PET scan in March 2020 showed uptake with SUV between 3 and 5 in the mediastinum, right perihilar area, no evidence of uptake in the abdomen or pelvis specially no uptake in the left inguinal area  In June 2020 cough intermittent without sputum production especially in the midthoracic area, denies any constitutional symptoms, CBC, CMP, LDH are normal     Repeat PET scan on 09/03/2020 showed progression of disease with innumerable new lymphadenopathy throughout the neck, chest, retroperitoneal, iliac, inguinal, splenic uptake, no evidence of bulky disease with score of 5, mild increase in the spleen size        Status post excision biopsy of the right inguinal lymph node on 09/22/2020  A  Right inguinal lymph node:  - Follicular lymphoma, follicular pattern, grade 3A (average of 17 centroblasts/HPF) - see Note     - Flow cytometry ([facility]#[id number], evaluated by Dr Melly Baptiste) reveals:   * CD10+ B-cell lymphoma, comprising 70% of total cells analyzed, of small to medium size with following immunophenotype:    -- positive: CD19, CD20, CD10, CD38, sKappa    -- negative: sLambda, CD5, CD11c, CD23   * Viability 7AAD: 89%    * The following antigens were evaluated & found to be expressed as described above or by appropriate cells:  CD2, CD3, CD4, CD5, CD7, CD8, CD10, CD11c, CD19, CD20, CD23, CD38, CD45, CD56, CD57, FMC7, sKappa, sLambda      There was no evidence of transformation, he was kept on watchful observation     COVID-19 infection in December 2020 with full recovery  Interval History:        Previous Treatment:         Test Results:    Imaging: No results found  Labs:   Lab Results   Component Value Date    WBC 6 65 02/24/2022    HGB 13 7 02/24/2022    HCT 40 9 02/24/2022    MCV 86 02/24/2022     02/24/2022     Lab Results   Component Value Date     08/16/2017    K 4 1 02/24/2022     02/24/2022    CO2 27 02/24/2022    BUN 14 02/24/2022    CREATININE 0 86 02/24/2022    GLUF 106 (H) 09/29/2017    CALCIUM 8 7 02/24/2022    CORRECTEDCA 9 2 12/30/2020    AST 17 02/24/2022    ALT 21 02/24/2022    ALKPHOS 101 02/24/2022    PROT 6 3 08/16/2017    BILITOT 0 5 08/16/2017    EGFR 99 02/24/2022       Lab Results   Component Value Date    IRON 103 07/01/2019    FERRITIN 187 07/01/2019       No results found for: [id number]      ROS: Review of Systems   Constitutional: Negative  Negative for appetite change, chills, diaphoresis, fatigue, fever and unexpected weight change  HENT:   Negative for hearing loss, lump/mass, mouth sores, nosebleeds, sore throat, trouble swallowing and voice change  Eyes: Negative  Negative for eye problems and icterus  Respiratory: Negative  Negative for chest tightness, cough, hemoptysis and shortness of breath  Cardiovascular: Negative for chest pain and leg swelling  Gastrointestinal: Negative for abdominal distention, abdominal pain, blood in stool, constipation, diarrhea and nausea  Endocrine: Negative      Genitourinary: Negative for dysuria, frequency, hematuria and pelvic pain  Musculoskeletal: Negative  Negative for arthralgias, back pain, flank pain, gait problem, myalgias and neck stiffness  Skin: Negative for itching and rash  Neurological: Negative for dizziness, gait problem, headaches, light-headedness, numbness and speech difficulty  Hematological: Negative for adenopathy  Does not bruise/bleed easily  Psychiatric/Behavioral: Negative for confusion, decreased concentration, depression and sleep disturbance  The patient is not nervous/anxious  Current Medications: Reviewed  Allergies: Reviewed  PMH/FH/SH:  Reviewed      Physical Exam:    Body surface area is 2 18 meters squared  Wt Readings from Last 3 Encounters:   03/01/22 93 9 kg (207 lb)   06/22/21 93 5 kg (206 lb 3 2 oz)   06/07/21 93 kg (205 lb)        Temp Readings from Last 3 Encounters:   03/01/22 98 °F (36 7 °C)   06/22/21 97 6 °F (36 4 °C) (Tympanic)   06/18/21 98 °F (36 7 °C) (Temporal)        BP Readings from Last 3 Encounters:   03/01/22 130/80   06/22/21 100/62   05/23/21 143/77         Pulse Readings from Last 3 Encounters:   03/01/22 72   06/22/21 75   05/23/21 85        Physical Exam  Vitals reviewed  Constitutional:       General: He is not in acute distress  Appearance: He is well-developed  He is not diaphoretic  HENT:      Head: Normocephalic and atraumatic  Eyes:      Conjunctiva/sclera: Conjunctivae normal    Neck:      Trachea: No tracheal deviation  Cardiovascular:      Rate and Rhythm: Normal rate and regular rhythm  Heart sounds: No murmur heard  No friction rub  No gallop  Pulmonary:      Effort: Pulmonary effort is normal  No respiratory distress  Breath sounds: Normal breath sounds  No wheezing or rales  Chest:      Chest wall: No tenderness  Abdominal:      General: There is no distension  Palpations: Abdomen is soft  Tenderness: There is no abdominal tenderness     Musculoskeletal: Cervical back: Normal range of motion and neck supple  Lymphadenopathy:      Cervical: No cervical adenopathy  Skin:     General: Skin is warm and dry  Coloration: Skin is not pale  Findings: No erythema  Neurological:      Mental Status: He is alert and oriented to person, place, and time  Psychiatric:         Behavior: Behavior normal          Thought Content: Thought content normal          Judgment: Judgment normal          ECO  Goals and Barriers:  Current Goal: Minimize effects of disease  Barriers: None  Patient's Capacity to Self Care:  Patient is able to self care      Code Status: @[facility]@

## 2022-04-12 ENCOUNTER — TELEPHONE (OUTPATIENT)
Dept: INTERNAL MEDICINE CLINIC | Facility: CLINIC | Age: 52
End: 2022-04-12

## 2022-04-21 ENCOUNTER — HOSPITAL ENCOUNTER (OUTPATIENT)
Dept: INFUSION CENTER | Facility: CLINIC | Age: 52
Discharge: HOME | End: 2022-04-21
Payer: COMMERCIAL

## 2022-04-21 DIAGNOSIS — C82.38 GRADE 3A FOLLICULAR LYMPHOMA OF LYMPH NODES OF MULTIPLE REGIONS (HCC): Primary | ICD-10-CM

## 2022-04-21 PROCEDURE — 96523 IRRIG DRUG DELIVERY DEVICE: CPT

## 2022-04-21 NOTE — PROGRESS NOTES
Pt to infusion center for port flush  Port accessed, flushed with 20 mL, brisk blood return noted  Port deaccessed w/o issue  Pt confirmed follow up and declined AVS at d/c

## 2022-06-20 DIAGNOSIS — C82.38 GRADE 3A FOLLICULAR LYMPHOMA OF LYMPH NODES OF MULTIPLE REGIONS (HCC): Primary | ICD-10-CM

## 2022-06-23 ENCOUNTER — HOSPITAL ENCOUNTER (OUTPATIENT)
Dept: INFUSION CENTER | Facility: CLINIC | Age: 52
Discharge: HOME | End: 2022-06-23
Payer: COMMERCIAL

## 2022-06-23 DIAGNOSIS — C82.38 GRADE 3A FOLLICULAR LYMPHOMA OF LYMPH NODES OF MULTIPLE REGIONS (HCC): Primary | ICD-10-CM

## 2022-06-23 LAB
ALBUMIN SERPL BCP-MCNC: 3.8 G/DL (ref 3.5–5)
ALP SERPL-CCNC: 184 U/L (ref 34–104)
ALT SERPL W P-5'-P-CCNC: 15 U/L (ref 7–52)
ANION GAP SERPL CALCULATED.3IONS-SCNC: 8 MMOL/L (ref 4–13)
AST SERPL W P-5'-P-CCNC: 21 U/L (ref 13–39)
BASOPHILS # BLD AUTO: 0.05 THOUSANDS/ΜL (ref 0–0.1)
BASOPHILS NFR BLD AUTO: 1 % (ref 0–1)
BILIRUB SERPL-MCNC: 1.05 MG/DL (ref 0.2–1)
BUN SERPL-MCNC: 14 MG/DL (ref 5–25)
CALCIUM SERPL-MCNC: 8.7 MG/DL (ref 8.4–10.2)
CHLORIDE SERPL-SCNC: 104 MMOL/L (ref 96–108)
CO2 SERPL-SCNC: 26 MMOL/L (ref 21–32)
CREAT SERPL-MCNC: 1.01 MG/DL (ref 0.6–1.3)
EOSINOPHIL # BLD AUTO: 0.2 THOUSAND/ΜL (ref 0–0.61)
EOSINOPHIL NFR BLD AUTO: 3 % (ref 0–6)
ERYTHROCYTE [DISTWIDTH] IN BLOOD BY AUTOMATED COUNT: 13.7 % (ref 11.6–15.1)
GFR SERPL CREATININE-BSD FRML MDRD: 84 ML/MIN/1.73SQ M
GLUCOSE SERPL-MCNC: 96 MG/DL (ref 65–140)
HCT VFR BLD AUTO: 39.1 % (ref 36.5–49.3)
HGB BLD-MCNC: 13 G/DL (ref 12–17)
IMM GRANULOCYTES # BLD AUTO: 0.06 THOUSAND/UL (ref 0–0.2)
IMM GRANULOCYTES NFR BLD AUTO: 1 % (ref 0–2)
LDH SERPL-CCNC: 139 U/L (ref 140–271)
LYMPHOCYTES # BLD AUTO: 0.96 THOUSANDS/ΜL (ref 0.6–4.47)
LYMPHOCYTES NFR BLD AUTO: 13 % (ref 14–44)
MCH RBC QN AUTO: 28.6 PG (ref 26.8–34.3)
MCHC RBC AUTO-ENTMCNC: 33.2 G/DL (ref 31.4–37.4)
MCV RBC AUTO: 86 FL (ref 82–98)
MONOCYTES # BLD AUTO: 0.92 THOUSAND/ΜL (ref 0.17–1.22)
MONOCYTES NFR BLD AUTO: 12 % (ref 4–12)
NEUTROPHILS # BLD AUTO: 5.46 THOUSANDS/ΜL (ref 1.85–7.62)
NEUTS SEG NFR BLD AUTO: 70 % (ref 43–75)
NRBC BLD AUTO-RTO: 0 /100 WBCS
PLATELET # BLD AUTO: 172 THOUSANDS/UL (ref 149–390)
PMV BLD AUTO: 9.9 FL (ref 8.9–12.7)
POTASSIUM SERPL-SCNC: 4.4 MMOL/L (ref 3.5–5.3)
PROT SERPL-MCNC: 6.1 G/DL (ref 6.4–8.4)
RBC # BLD AUTO: 4.54 MILLION/UL (ref 3.88–5.62)
SODIUM SERPL-SCNC: 138 MMOL/L (ref 135–147)
WBC # BLD AUTO: 7.65 THOUSAND/UL (ref 4.31–10.16)

## 2022-06-23 PROCEDURE — 83615 LACTATE (LD) (LDH) ENZYME: CPT | Performed by: INTERNAL MEDICINE

## 2022-06-23 PROCEDURE — 80053 COMPREHEN METABOLIC PANEL: CPT | Performed by: INTERNAL MEDICINE

## 2022-06-23 PROCEDURE — 85025 COMPLETE CBC W/AUTO DIFF WBC: CPT | Performed by: INTERNAL MEDICINE

## 2022-07-01 ENCOUNTER — HOSPITAL ENCOUNTER (OUTPATIENT)
Dept: RADIOLOGY | Age: 52
Discharge: HOME | End: 2022-07-01
Payer: COMMERCIAL

## 2022-07-01 DIAGNOSIS — C82.33 FOLLICULAR LYMPHOMA GRADE IIIA OF INTRA-ABDOMINAL LYMPH NODES (HCC): ICD-10-CM

## 2022-07-01 LAB — GLUCOSE SERPL-MCNC: 90 MG/DL (ref 65–140)

## 2022-07-01 PROCEDURE — G1004 CDSM NDSC: HCPCS

## 2022-07-01 PROCEDURE — 78815 PET IMAGE W/CT SKULL-THIGH: CPT

## 2022-07-01 PROCEDURE — 82948 REAGENT STRIP/BLOOD GLUCOSE: CPT

## 2022-07-01 PROCEDURE — A9552 F18 FDG: HCPCS

## 2022-08-18 ENCOUNTER — HOSPITAL ENCOUNTER (OUTPATIENT)
Dept: INFUSION CENTER | Facility: CLINIC | Age: 52
End: 2022-08-18

## 2022-08-19 ENCOUNTER — TELEPHONE (OUTPATIENT)
Dept: HEMATOLOGY ONCOLOGY | Facility: CLINIC | Age: 52
End: 2022-08-19

## 2022-08-19 ENCOUNTER — HOSPITAL ENCOUNTER (OUTPATIENT)
Dept: INFUSION CENTER | Facility: CLINIC | Age: 52
Discharge: HOME | End: 2022-08-19
Payer: COMMERCIAL

## 2022-08-19 DIAGNOSIS — C82.38 GRADE 3A FOLLICULAR LYMPHOMA OF LYMPH NODES OF MULTIPLE REGIONS (HCC): Primary | ICD-10-CM

## 2022-08-19 LAB
ALBUMIN SERPL BCP-MCNC: 3.9 G/DL (ref 3.5–5)
ALP SERPL-CCNC: 135 U/L (ref 34–104)
ALT SERPL W P-5'-P-CCNC: 10 U/L (ref 7–52)
ANION GAP SERPL CALCULATED.3IONS-SCNC: 7 MMOL/L (ref 4–13)
AST SERPL W P-5'-P-CCNC: 15 U/L (ref 13–39)
BASOPHILS # BLD AUTO: 0.04 THOUSANDS/ΜL (ref 0–0.1)
BASOPHILS NFR BLD AUTO: 1 % (ref 0–1)
BILIRUB SERPL-MCNC: 0.89 MG/DL (ref 0.2–1)
BUN SERPL-MCNC: 16 MG/DL (ref 5–25)
CALCIUM SERPL-MCNC: 9 MG/DL (ref 8.4–10.2)
CHLORIDE SERPL-SCNC: 106 MMOL/L (ref 96–108)
CO2 SERPL-SCNC: 26 MMOL/L (ref 21–32)
CREAT SERPL-MCNC: 0.9 MG/DL (ref 0.6–1.3)
EOSINOPHIL # BLD AUTO: 0.25 THOUSAND/ΜL (ref 0–0.61)
EOSINOPHIL NFR BLD AUTO: 4 % (ref 0–6)
ERYTHROCYTE [DISTWIDTH] IN BLOOD BY AUTOMATED COUNT: 14.2 % (ref 11.6–15.1)
GFR SERPL CREATININE-BSD FRML MDRD: 97 ML/MIN/1.73SQ M
GLUCOSE SERPL-MCNC: 87 MG/DL (ref 65–140)
HCT VFR BLD AUTO: 38.7 % (ref 36.5–49.3)
HGB BLD-MCNC: 12.3 G/DL (ref 12–17)
IMM GRANULOCYTES # BLD AUTO: 0.03 THOUSAND/UL (ref 0–0.2)
IMM GRANULOCYTES NFR BLD AUTO: 1 % (ref 0–2)
LDH SERPL-CCNC: 110 U/L (ref 140–271)
LYMPHOCYTES # BLD AUTO: 0.86 THOUSANDS/ΜL (ref 0.6–4.47)
LYMPHOCYTES NFR BLD AUTO: 14 % (ref 14–44)
MCH RBC QN AUTO: 27.1 PG (ref 26.8–34.3)
MCHC RBC AUTO-ENTMCNC: 31.8 G/DL (ref 31.4–37.4)
MCV RBC AUTO: 85 FL (ref 82–98)
MONOCYTES # BLD AUTO: 0.73 THOUSAND/ΜL (ref 0.17–1.22)
MONOCYTES NFR BLD AUTO: 11 % (ref 4–12)
NEUTROPHILS # BLD AUTO: 4.48 THOUSANDS/ΜL (ref 1.85–7.62)
NEUTS SEG NFR BLD AUTO: 69 % (ref 43–75)
NRBC BLD AUTO-RTO: 0 /100 WBCS
PLATELET # BLD AUTO: 169 THOUSANDS/UL (ref 149–390)
PMV BLD AUTO: 10.3 FL (ref 8.9–12.7)
POTASSIUM SERPL-SCNC: 4.3 MMOL/L (ref 3.5–5.3)
PROT SERPL-MCNC: 6.1 G/DL (ref 6.4–8.4)
RBC # BLD AUTO: 4.54 MILLION/UL (ref 3.88–5.62)
SODIUM SERPL-SCNC: 139 MMOL/L (ref 135–147)
WBC # BLD AUTO: 6.39 THOUSAND/UL (ref 4.31–10.16)

## 2022-08-19 PROCEDURE — 80053 COMPREHEN METABOLIC PANEL: CPT

## 2022-08-19 PROCEDURE — 83615 LACTATE (LD) (LDH) ENZYME: CPT

## 2022-08-19 PROCEDURE — 85025 COMPLETE CBC W/AUTO DIFF WBC: CPT

## 2022-08-19 NOTE — PATIENT INSTRUCTIONS
August 2022 Sunday Monday Tuesday Wednesday Thursday Friday Saturday        1     2     3     4     5     6       7     8     9     10     11     12     13       14     15     16     17     18     19    INF CATHETER MAINTENANCE   8:30 AM   (60 min )   AN INF [address] 15       99     41     37     76     48     88     12       28     29     30     31                                  September 2022 Sunday Monday Tuesday Wednesday Thursday Friday Saturday                       1     2     3       4     5     6     7    FOLLOW UP PG   8:15 AM   (30 min )   Hayder Joyce PA-C   [facility] [place] [address]     9     10       11     12     13     14     15     15     62       57     69     01     25     56     87     30       06     50     78     05     73     24

## 2022-08-19 NOTE — PROGRESS NOTES
Patient here for labs in prep for f/u in September  Labs drawn as ordered via port without incident, patient tolerated well  No changes or c/o reported  He will schedule his next port flush on discharge today

## 2022-08-19 NOTE — TELEPHONE ENCOUNTER
CALL RETURN FORM   Reason for patient call? Patient calling needs lab orders put in for coming appmt with Ms Caio Simon    Patient's primary oncologist? Caio Simon    Name of person the patient was calling for? Radhae   Any additional information to add, if applicable? [phone]   Informed patient that the message will be forwarded to the team and someone will get back to them as soon as possible    Did you relay this information to the patient?   Yes

## 2022-09-07 ENCOUNTER — OFFICE VISIT (OUTPATIENT)
Dept: HEMATOLOGY ONCOLOGY | Facility: CLINIC | Age: 52
End: 2022-09-07
Payer: COMMERCIAL

## 2022-09-07 VITALS
DIASTOLIC BLOOD PRESSURE: 62 MMHG | HEIGHT: 73 IN | BODY MASS INDEX: 26.24 KG/M2 | HEART RATE: 79 BPM | WEIGHT: 198 LBS | RESPIRATION RATE: 17 BRPM | TEMPERATURE: 97.3 F | SYSTOLIC BLOOD PRESSURE: 115 MMHG | OXYGEN SATURATION: 98 %

## 2022-09-07 DIAGNOSIS — C82.38 GRADE 3A FOLLICULAR LYMPHOMA OF LYMPH NODES OF MULTIPLE REGIONS (HCC): Primary | ICD-10-CM

## 2022-09-07 PROCEDURE — 99215 OFFICE O/P EST HI 40 MIN: CPT | Performed by: PHYSICIAN ASSISTANT

## 2022-09-07 NOTE — PROGRESS NOTES
Hematology/Oncology Outpatient Follow- up Note  Pradip Liu 47 y o  male MRN: @ Encounter: [id number]        Date:  9/7/2022      Assessment / Plan:    68-year-old  male with history of stage IV follicular lymphoma grade 3B, biopsy proven 5/2017 when he presented with weight loss, splenomegaly, constitutional symptoms, multiple lymph nodes above and below the diaphragm  Bone marrow biopsy 6/2017 showed 10% involvement with follicular lymphoma  He was treated with R-CHOP for total of 6 cycles spanning from May 2017 until September 2017  Subsequent PET scan showed no evidence of disease and no evidence of splenomegaly or lymphadenopathy     He received maintenance rituximab 375 milligram/meter squared every 2 months for total of 2 years finished in October 2019  Relapse of disease by PET scan in September 2020 which showed innumerable bilateral cervical, axillary, mediastinal, retroperitoneal, bilateral iliac, inguinal lymph nodes, splenic involvement  Excisional biopsy of a right inguinal lymph node 9/22/20 showed relapsed follicular lymphoma stage IIIA, no evidence of transformation, positive for CD 19, CD 20, CD 10, Ki-67 about 60%     Pet/ct  7/1/22 - ordered by Dr Terence Mason at [address] [place] [address] compared to PET CT 9/3/2020 and priors  Significant interval progression of widespread hypermetabolic adenopathy, including retroperitoneal, mesenteric, periportal, bilateral iliac chain and pelvic sidewall and bilateral inguinal adenopathy  Large confluent upper abdominal periportal and peripancreatic kadie mass has an SUV of 10 3  This measures approximately 7 7 x 8 cm in axial dimensions  Large confluent retroperitoneal and mesenteric adenopathy at the level of the kidneys has an SUV of 8 3  This measures in total approximately 11 2 x 11 cm in axial dimensions based on the PET images  Markedly increased splenomegaly measuring 24 cm craniocaudad dimension  SUV measures 2 9   Prior PET/CT measurement 15 cm, SUV 4 4  Deauville score of 5       8/19/22- CBCD normal, however, hemoglobin 12 3 8/19/22 compared to 15 5 6/23/2020  Alk phos 101 - 184 since 12/2020  Total protein 6 1 - 6 3 since 12/2020  6 5- 6 7 9/2020 and priors  Reviewed PET-CT with patient  He reports he is feeling well  We discussed repeat biopsy to evaluate for transformation and treatment due to tumor burden  He states he wants to defer treatment for as long as possible and declines at this time  Discussed I will reach out to Dr Ana Tracey to discuss and get her recommendation as well  HPI:   Chester Haynes is a 47 y o  male with follicular lymphoma  He presented in 2016 with splenomegaly, weight loss, constitutional symptoms  Multiple enlarged lymph nodes noted above and below the diaphragm  Excisional biopsy 5/2017 of 1 of the retroperitoneal lymph node confirmed the diagnosis  Bone marrow biopsy 6/2/2017 showed 10 percent involvement with follicular lymphoma  No evidence of transformation, 2nd opinion at [address] [place] [address] agreed on treatment with R-CHOP     He received 6 cycles of R-CHOP spanning from May 2017 until September 2017  Subsequent PET scan showed no evidence of disease and physical examination showed disappearance of splenomegaly and lymphadenopathy     Maintenance rituximab 375 milligram/meter squared every 2 months for total of 2 years finished in October 2019     PET scan in January 2020 showed uptake in the right inguinal area however patient did not have any constitutional symptoms  He had normal CBC, CMP, LDH we decided to watch and observe   At that time physical examination showed 1 5 cm left inguinal lymph node     Repeat PET scan in March 2020 showed uptake with SUV between 3 and 5 in the mediastinum, right perihilar area, no evidence of uptake in the abdomen or pelvis specially no uptake in the left inguinal area    In June 2020 cough intermittent without sputum production especially in the midthoracic area, denies any constitutional symptoms, CBC, CMP, LDH are normal     Repeat PET scan on 09/03/2020 showed progression of disease with innumerable new glucose avid lymph nodes throughout the neck, chest, retroperitoneal, iliac, inguinal regions, splenic uptake, no evidence of bulky disease with score of 5, mild increase in the spleen size        Status post excision biopsy of the right inguinal lymph node on 09/22/2020  A  Right inguinal lymph node:  - Follicular lymphoma, follicular pattern, grade 3A (average of 17 centroblasts/HPF) - see Note  - Flow cytometry ([facility]#[id number], evaluated by Dr Jaswant Aaron) reveals:   * CD10+ B-cell lymphoma, comprising 70% of total cells analyzed, of small to medium size with following immunophenotype:    -- positive: CD19, CD20, CD10, CD38, sKappa    -- negative: sLambda, CD5, CD11c, CD23   * Viability 7AAD: 89%  * The following antigens were evaluated & found to be expressed as described above or by appropriate cells:  CD2, CD3, CD4, CD5, CD7, CD8, CD10, CD11c, CD19, CD20, CD23, CD38, CD45, CD56, CD57, FMC7, sKappa, sLambda  There was no evidence of transformation, he was kept on watchful observation  COVID-19 infection in December 2020 with full recovery    Interval History:    Patient called 6/20/22 reporting abdominal pressure in spleen region  CBCD, CMP, LDH unremarkable      pet/ct 7/1/22 ordered by Dr Dada Ladd at [address] compared to PET CT 9/3/2020 and priors  Significant interval progression of widespread hypermetabolic adenopathy, including retroperitoneal, mesenteric, periportal, bilateral iliac chain and pelvic sidewall and bilateral inguinal adenopathy  Large confluent upper abdominal periportal and peripancreatic kadie mass has an SUV of 10 3   This measures approximately 7 7 x 8 cm in axial dimensions  Large confluent retroperitoneal and mesenteric adenopathy at the level of the kidneys has an SUV of 8 3  This measures in total approximately 11 2 x 11 cm in axial dimensions based on the PET images  Markedly increased splenomegaly measuring 24 cm craniocaudad dimension  SUV measures 2 9  Prior PET/CT measurement 15 cm, SUV 4 4  Deauville score of 5     8/19/22- CBCD normal, however, hemoglobin 12 3 8/19/22 compared to 15 5 6/23/2020    Patient reports he is feeling well  Since June he is not had recurrence of the abdominal pressure  Appetite has been stable, weight has been stable  No night sweats  He is aware of axillary and inguinal adenopathy but this is not bothersome  He reports he is moving his bowels and urinating without difficulty  No fevers, chills, sweats  Test Results:        Labs:   Lab Results   Component Value Date    HGB 12 3 08/19/2022    HCT 38 7 08/19/2022    MCV 85 08/19/2022     08/19/2022    WBC 6 39 08/19/2022    NRBC 0 08/19/2022    ATYLMPCT 4 (H) 10/02/2017     Lab Results   Component Value Date     08/16/2017    K 4 3 08/19/2022     08/19/2022    CO2 26 08/19/2022    BUN 16 08/19/2022    CREATININE 0 90 08/19/2022    GLUF 106 (H) 09/29/2017    CALCIUM 9 0 08/19/2022    CORRECTEDCA 9 2 12/30/2020    AST 15 08/19/2022    ALT 10 08/19/2022    ALKPHOS 135 (H) 08/19/2022    PROT 6 3 08/16/2017    BILITOT 0 5 08/16/2017    EGFR 97 08/19/2022       Imaging: No results found  ROS:  As mentioned in HPI & Interval History otherwise 14 point ROS negative  Allergies: No Known Allergies  Current Medications: Reviewed  PMH/FH/SH:  Reviewed      Physical Exam:    There is no height or weight on file to calculate BSA      Ht Readings from Last 3 Encounters:   03/01/22 6' 1" (1 854 m)   06/22/21 6' 1" (1 854 m)   06/07/21 6' 1" (1 854 m)        Wt Readings from Last 3 Encounters:   03/01/22 93 9 kg (207 lb)   06/22/21 93 5 kg (206 lb 3 2 oz)   06/07/21 93 kg (205 lb)        Temp Readings from Last 3 Encounters:   03/01/22 98 °F (36 7 °C)   06/22/21 97 6 °F (36 4 °C) (Tympanic)   21 98 °F (36 7 °C) (Temporal)        BP Readings from Last 3 Encounters:   22 130/80   21 100/62   21 143/77           Physical Exam  Vitals reviewed  Constitutional:       General: He is not in acute distress  Appearance: He is well-developed  He is not diaphoretic  HENT:      Head: Normocephalic and atraumatic  Eyes:      Conjunctiva/sclera: Conjunctivae normal    Neck:      Trachea: No tracheal deviation  Cardiovascular:      Rate and Rhythm: Normal rate and regular rhythm  Heart sounds: No murmur heard  No friction rub  No gallop  Pulmonary:      Effort: Pulmonary effort is normal  No respiratory distress  Breath sounds: Normal breath sounds  No wheezing or rales  Chest:      Chest wall: No tenderness  Abdominal:      General: There is no distension  Palpations: Abdomen is soft  Tenderness: There is no abdominal tenderness  Comments: Interestingly, I did not feel the spleen   Musculoskeletal:      Cervical back: Normal range of motion and neck supple  Lymphadenopathy:      Cervical: Cervical adenopathy present  Comments: L>R palpable axillary lymph nodes  L>R palpable inguinal lymph nodes   Skin:     General: Skin is warm and dry  Coloration: Skin is not pale  Findings: No erythema  Neurological:      Mental Status: He is alert and oriented to person, place, and time  Psychiatric:         Behavior: Behavior normal          Thought Content: Thought content normal          Judgment: Judgment normal          ECO      Emergency Contacts:    Extended Emergency Contact Information  Primary Emergency Contact:  Arin Hudson  Address: [address] [facility] [place]  [address] Phone: [phone]  Mobile Phone: [phone]  Relation: Spouse

## 2022-09-14 ENCOUNTER — TELEPHONE (OUTPATIENT)
Dept: HEMATOLOGY ONCOLOGY | Facility: CLINIC | Age: 52
End: 2022-09-14

## 2022-09-14 NOTE — TELEPHONE ENCOUNTER
Pt called back and explained his appt with Dr Agnes Hughes has been moved to 9/22  Follow up scheduled with Dr Anisha Huff 9/26  Octavia Peralta notified

## 2022-09-14 NOTE — TELEPHONE ENCOUNTER
Called Dr Jose Melendrez office 9/7/22  Spoke with nurse Migue Mcmahan  Didn't hear back from Dr Nam Manuel  Discussed with Dr Fatoumata Schwartz  He reviewed pet/ct  Patient asymptomatic  He recommends keeping f/u as scheduled with Dr Nam Manuel on October 18, 2022  F/U with Dr Fatoumata Schwartz after 10/18/22 f/u appointment with Dr Nam Manuel recommended  Called patient  Nondescript phone number  Left message to return call to discuss

## 2022-09-23 ENCOUNTER — TELEPHONE (OUTPATIENT)
Dept: HEMATOLOGY ONCOLOGY | Facility: CLINIC | Age: 52
End: 2022-09-23

## 2022-09-23 ENCOUNTER — HOSPITAL ENCOUNTER (OUTPATIENT)
Dept: INFUSION CENTER | Facility: CLINIC | Age: 52
End: 2022-09-23
Payer: COMMERCIAL

## 2022-09-23 DIAGNOSIS — C82.38 GRADE 3A FOLLICULAR LYMPHOMA OF LYMPH NODES OF MULTIPLE REGIONS (HCC): Primary | ICD-10-CM

## 2022-09-23 LAB
ALBUMIN SERPL BCP-MCNC: 4.1 G/DL (ref 3.5–5)
ALP SERPL-CCNC: 135 U/L (ref 34–104)
ALT SERPL W P-5'-P-CCNC: 13 U/L (ref 7–52)
ANION GAP SERPL CALCULATED.3IONS-SCNC: 7 MMOL/L (ref 4–13)
AST SERPL W P-5'-P-CCNC: 17 U/L (ref 13–39)
BASOPHILS # BLD AUTO: 0.07 THOUSANDS/ΜL (ref 0–0.1)
BASOPHILS NFR BLD AUTO: 1 % (ref 0–1)
BILIRUB SERPL-MCNC: 0.75 MG/DL (ref 0.2–1)
BUN SERPL-MCNC: 17 MG/DL (ref 5–25)
CALCIUM SERPL-MCNC: 9 MG/DL (ref 8.4–10.2)
CHLORIDE SERPL-SCNC: 104 MMOL/L (ref 96–108)
CO2 SERPL-SCNC: 28 MMOL/L (ref 21–32)
CREAT SERPL-MCNC: 0.86 MG/DL (ref 0.6–1.3)
EOSINOPHIL # BLD AUTO: 0.27 THOUSAND/ΜL (ref 0–0.61)
EOSINOPHIL NFR BLD AUTO: 4 % (ref 0–6)
ERYTHROCYTE [DISTWIDTH] IN BLOOD BY AUTOMATED COUNT: 14.6 % (ref 11.6–15.1)
GFR SERPL CREATININE-BSD FRML MDRD: 99 ML/MIN/1.73SQ M
GLUCOSE SERPL-MCNC: 86 MG/DL (ref 65–140)
HCT VFR BLD AUTO: 40 % (ref 36.5–49.3)
HGB BLD-MCNC: 13.2 G/DL (ref 12–17)
IMM GRANULOCYTES # BLD AUTO: 0.03 THOUSAND/UL (ref 0–0.2)
IMM GRANULOCYTES NFR BLD AUTO: 0 % (ref 0–2)
LDH SERPL-CCNC: 104 U/L (ref 140–271)
LYMPHOCYTES # BLD AUTO: 1.24 THOUSANDS/ΜL (ref 0.6–4.47)
LYMPHOCYTES NFR BLD AUTO: 18 % (ref 14–44)
MCH RBC QN AUTO: 27.7 PG (ref 26.8–34.3)
MCHC RBC AUTO-ENTMCNC: 33 G/DL (ref 31.4–37.4)
MCV RBC AUTO: 84 FL (ref 82–98)
MONOCYTES # BLD AUTO: 0.78 THOUSAND/ΜL (ref 0.17–1.22)
MONOCYTES NFR BLD AUTO: 11 % (ref 4–12)
NEUTROPHILS # BLD AUTO: 4.67 THOUSANDS/ΜL (ref 1.85–7.62)
NEUTS SEG NFR BLD AUTO: 66 % (ref 43–75)
NRBC BLD AUTO-RTO: 0 /100 WBCS
PLATELET # BLD AUTO: 185 THOUSANDS/UL (ref 149–390)
PMV BLD AUTO: 10.1 FL (ref 8.9–12.7)
POTASSIUM SERPL-SCNC: 4.1 MMOL/L (ref 3.5–5.3)
PROT SERPL-MCNC: 6.3 G/DL (ref 6.4–8.4)
RBC # BLD AUTO: 4.76 MILLION/UL (ref 3.88–5.62)
SODIUM SERPL-SCNC: 139 MMOL/L (ref 135–147)
WBC # BLD AUTO: 7.06 THOUSAND/UL (ref 4.31–10.16)

## 2022-09-23 PROCEDURE — 80053 COMPREHEN METABOLIC PANEL: CPT | Performed by: INTERNAL MEDICINE

## 2022-09-23 PROCEDURE — 85025 COMPLETE CBC W/AUTO DIFF WBC: CPT | Performed by: INTERNAL MEDICINE

## 2022-09-23 PROCEDURE — 83615 LACTATE (LD) (LDH) ENZYME: CPT | Performed by: INTERNAL MEDICINE

## 2022-09-23 NOTE — PROGRESS NOTES
Patient arrived for central line blood work  Offers no complaints  Blood work drawn with port access  Port flushed per protocol   Patient verified upcoming appointments and declined AVS

## 2022-09-26 ENCOUNTER — OFFICE VISIT (OUTPATIENT)
Dept: HEMATOLOGY ONCOLOGY | Facility: CLINIC | Age: 52
End: 2022-09-26
Payer: COMMERCIAL

## 2022-09-26 VITALS
BODY MASS INDEX: 26.64 KG/M2 | SYSTOLIC BLOOD PRESSURE: 138 MMHG | OXYGEN SATURATION: 97 % | TEMPERATURE: 98.1 F | HEIGHT: 73 IN | DIASTOLIC BLOOD PRESSURE: 72 MMHG | WEIGHT: 201 LBS | HEART RATE: 66 BPM | RESPIRATION RATE: 16 BRPM

## 2022-09-26 DIAGNOSIS — C82.38 GRADE 3A FOLLICULAR LYMPHOMA OF LYMPH NODES OF MULTIPLE REGIONS (HCC): Primary | ICD-10-CM

## 2022-09-26 PROCEDURE — 99214 OFFICE O/P EST MOD 30 MIN: CPT | Performed by: INTERNAL MEDICINE

## 2022-09-26 NOTE — PROGRESS NOTES
Hematology Outpatient Follow - Up Note  Trupti Munoz 47 y o  male MRN: @ Encounter: [id number]        Date:  9/26/2022        Assessment/ Plan:    76-year-old  male with history of stage IV follicular lymphoma grade 3B, biopsy proven 5/2017 when he presented with weight loss, splenomegaly, constitutional symptoms, multiple lymph nodes above and below the diaphragm  Bone marrow biopsy 6/2017 showed 10% involvement with follicular lymphoma  He was treated with R-CHOP for total of 6 cycles spanning from May 2017 until September 2017  Subsequent PET scan showed no evidence of disease and no evidence of splenomegaly or lymphadenopathy     He received maintenance rituximab 375 milligram/meter squared every 2 months for total of 2 years finished in October 2019      Relapse of disease by PET scan in September 2020 which showed innumerable bilateral cervical, axillary, mediastinal, retroperitoneal, bilateral iliac, inguinal lymph nodes, splenic involvement      Excisional biopsy of a right inguinal lymph node 6/02/94 DJVHTR relapsed follicular lymphoma stage IIIA, no evidence of transformation, positive for CD 19, CD 20, CD 10, Ki-67 about 60%     Pet/ct  7/1/22 - ordered by Dr Vane Palafox at [address] [place] [address] compared to PET CT 9/3/2020 and priors  Significant interval progression of widespread hypermetabolic adenopathy, including retroperitoneal, mesenteric, periportal, bilateral iliac chain and pelvic sidewall and bilateral inguinal adenopathy  Large confluent upper abdominal periportal and peripancreatic kadie mass has an SUV of 10 3  This measures approximately 7 7 x 8 cm in axial dimensions  Large confluent retroperitoneal and mesenteric adenopathy at the level of the kidneys has an SUV of 8 3  This measures in total approximately 11 2 x 11 cm in axial dimensions based on the PET images  Markedly increased splenomegaly measuring 24 cm craniocaudad dimension  SUV measures 2 9   Prior PET/CT measurement 15 cm, SUV 4 4  Deauville score of 5  He is asymptomatic, normal LDH, creatinine, calcium, ALT, AST    He was evaluated at [facility] with Dr Dada Ladd and the patient decided to watch and observe, he might need a biopsy of the increased activity on the PET scan to rule out transformation    Options of treatment including obinutuzumab/bendamustine, PI3 kinase inhibitor, car T, rituximab/bendamustine    With this rapid progression and I believe the patient needs therapy in the near future    He will follow-up with Dr Dada Ladd in January 2023        Labs and imaging studies are reviewed by ordering provider once results are available  If there are findings that need immediate attention, you will be contacted when results available  Discussing results and the implication on your healthcare is best discussed in person at your follow-up visit  HPI:    Rachel Section a 47 y  o  male with follicular lymphoma  He presented in 2016 with splenomegaly, weight loss, constitutional symptoms      Multiple enlarged lymph nodes noted above and below the diaphragm  Excisional biopsy 5/2017 of 1 of the retroperitoneal lymph node confirmed the diagnosis  Bone marrow biopsy 6/2/2017 showed 10 percent involvement with follicular lymphoma      No evidence of transformation, 2nd opinion at [address] [place] [address] agreed on treatment with R-CHOP     He received 6 cycles of R-CHOP spanning from May 2017 until September 2017      Subsequent PET scan showed no evidence of disease and physical examination showed disappearance of splenomegaly and lymphadenopathy     Maintenance rituximab 375 milligram/meter squared every 2 months for total of 2 years finished in October 2019     PET scan in January 2020 showed uptake in the right inguinal area however patient did not have any constitutional symptoms  He had normal CBC, CMP, LDH we decided to watch and observe   At that time physical examination showed 1 5 cm left inguinal lymph node     Repeat PET scan in March 2020 showed uptake with SUV between 3 and 5 in the mediastinum, right perihilar area, no evidence of uptake in the abdomen or pelvis specially no uptake in the left inguinal area     In June 2020 cough intermittent without sputum production especially in the midthoracic area, denies any constitutional symptoms, CBC, CMP, LDH are normal     Repeat PET scan on 09/03/2020 showed progression of disease with innumerable new glucose avid lymph nodes throughout the neck, chest, retroperitoneal, iliac, inguinal regions, splenic uptake, no evidence of bulky disease with score of 5, mild increase in the spleen size        Status post excision biopsy of the right inguinal lymph node on 09/22/2020  A  Right inguinal lymph node:  - Follicular lymphoma, follicular pattern, grade 3A (average of 17 centroblasts/HPF) - see Note     - Flow cytometry ([facility]#[id number], evaluated by Dr Judge Phillips) reveals:   * CD10+ B-cell lymphoma, comprising 70% of total cells analyzed, of small to medium size with following immunophenotype:    -- positive: CD19, CD20, CD10, CD38, sKappa    -- negative: sLambda, CD5, CD11c, CD23   * Viability 7AAD: 89%    * The following antigens were evaluated & found to be expressed as described above or by appropriate cells:  CD2, CD3, CD4, CD5, CD7, CD8, CD10, CD11c, CD19, CD20, CD23, CD38, CD45, CD56, CD57, FMC7, sKappa, sLambda      There was no evidence of transformation, he was kept on watchful observation      COVID-19 infection in December 2020 with full recovery    On 06/2022 he felt abdominal pressure after drinking beer, by physical examination he was found to have splenomegaly, PET scan showed multiple lymphadenopathy in the cervical, supra and infraclavicular, axillary, abdominal, mediastinal, inguinal area the largest in the retroperitoneal area up to 7 cm, spleen is enlarged as well however he has no symptoms, LDH is normal, CBC, CMP  Interval History: Previous Treatment:         Test Results:    Imaging: No results found  Labs:   Lab Results   Component Value Date    WBC 7 06 09/23/2022    HGB 13 2 09/23/2022    HCT 40 0 09/23/2022    MCV 84 09/23/2022     09/23/2022     Lab Results   Component Value Date     08/16/2017    K 4 1 09/23/2022     09/23/2022    CO2 28 09/23/2022    BUN 17 09/23/2022    CREATININE 0 86 09/23/2022    GLUF 106 (H) 09/29/2017    CALCIUM 9 0 09/23/2022    CORRECTEDCA 9 2 12/30/2020    AST 17 09/23/2022    ALT 13 09/23/2022    ALKPHOS 135 (H) 09/23/2022    PROT 6 3 08/16/2017    BILITOT 0 5 08/16/2017    EGFR 99 09/23/2022       Lab Results   Component Value Date    IRON 103 07/01/2019    FERRITIN 187 07/01/2019       No results found for: [id number]      ROS: Review of Systems   Constitutional: Negative  Negative for appetite change, chills, diaphoresis, fatigue, fever and unexpected weight change  HENT:   Negative for hearing loss, lump/mass, mouth sores, nosebleeds, sore throat, trouble swallowing and voice change  Eyes: Negative  Negative for eye problems and icterus  Respiratory: Negative  Negative for chest tightness, cough, hemoptysis and shortness of breath  Cardiovascular: Negative for chest pain and leg swelling  Gastrointestinal: Negative for abdominal distention, abdominal pain, blood in stool, constipation, diarrhea and nausea  Endocrine: Negative  Genitourinary: Negative for dysuria, frequency, hematuria and pelvic pain  Musculoskeletal: Negative  Negative for arthralgias, back pain, flank pain, gait problem, myalgias and neck stiffness  Skin: Negative for itching and rash  Neurological: Negative for dizziness, gait problem, headaches, light-headedness, numbness and speech difficulty  Hematological: Negative for adenopathy  Does not bruise/bleed easily  Psychiatric/Behavioral: Negative for confusion, decreased concentration, depression and sleep disturbance   The patient is not nervous/anxious  Current Medications: Reviewed  Allergies: Reviewed  PMH/FH/SH:  Reviewed      Physical Exam:    Body surface area is 2 16 meters squared  Wt Readings from Last 3 Encounters:   09/26/22 91 2 kg (201 lb)   09/07/22 89 8 kg (198 lb)   03/01/22 93 9 kg (207 lb)        Temp Readings from Last 3 Encounters:   09/26/22 98 1 °F (36 7 °C)   09/07/22 (!) 97 3 °F (36 3 °C)   03/01/22 98 °F (36 7 °C)        BP Readings from Last 3 Encounters:   09/26/22 138/72   09/07/22 115/62   03/01/22 130/80         Pulse Readings from Last 3 Encounters:   09/26/22 66   09/07/22 79   03/01/22 72        Physical Exam  Vitals reviewed  Constitutional:       General: He is not in acute distress  Appearance: He is well-developed  He is not diaphoretic  HENT:      Head: Normocephalic and atraumatic  Eyes:      Conjunctiva/sclera: Conjunctivae normal    Neck:      Trachea: No tracheal deviation  Comments: Multiple lymphadenopathy including the cervical, supraclavicular, infraclavicular, right axillary, bilateral inguinal area up or 2 cm in the left inguinal area  Cardiovascular:      Rate and Rhythm: Normal rate and regular rhythm  Heart sounds: No murmur heard  No friction rub  No gallop  Pulmonary:      Effort: Pulmonary effort is normal  No respiratory distress  Breath sounds: Normal breath sounds  No wheezing or rales  Chest:      Chest wall: No tenderness  Abdominal:      General: There is no distension  Palpations: Abdomen is soft  There is splenomegaly (6 cm)  Tenderness: There is no abdominal tenderness  Musculoskeletal:      Cervical back: Normal range of motion and neck supple  Right lower leg: No edema  Left lower leg: No edema  Lymphadenopathy:      Cervical: No cervical adenopathy  Skin:     General: Skin is warm and dry  Coloration: Skin is not pale  Findings: No erythema     Neurological:      Mental Status: He is alert and oriented to person, place, and time  Psychiatric:         Behavior: Behavior normal          Thought Content: Thought content normal          Judgment: Judgment normal          ECO  Goals and Barriers:  Current Goal: Minimize effects of disease  Barriers: None  Patient's Capacity to Self Care:  Patient is able to self care      Code Status: [unfilled]

## 2022-10-12 PROBLEM — R05.9 COUGH: Status: RESOLVED | Noted: 2020-12-10 | Resolved: 2022-10-12

## 2022-11-08 ENCOUNTER — HOSPITAL ENCOUNTER (OUTPATIENT)
Dept: INFUSION CENTER | Facility: CLINIC | Age: 52
Discharge: HOME | End: 2022-11-08

## 2022-11-08 ENCOUNTER — LAB REQUISITION (OUTPATIENT)
Dept: LAB | Facility: HOSPITAL | Age: 52
End: 2022-11-08

## 2022-11-08 DIAGNOSIS — C82.38 GRADE 3A FOLLICULAR LYMPHOMA OF LYMPH NODES OF MULTIPLE REGIONS (HCC): Primary | ICD-10-CM

## 2022-11-08 DIAGNOSIS — C82.33 FOLLICULAR LYMPHOMA GRADE IIIA, INTRA-ABDOMINAL LYMPH NODES (HCC): ICD-10-CM

## 2022-11-08 LAB
APTT PPP: 29 SECONDS (ref 23–37)
INR PPP: 1.06 (ref 0.84–1.19)
PROTHROMBIN TIME: 14 SECONDS (ref 11.6–14.5)

## 2022-11-08 NOTE — PROGRESS NOTES
Pt here for port flush and lab draw  Port accessed with positive blood return noted  Prothrombin and PTT sent to lab with paper orders  Port de accessed and flushed  AVS reviewed  Pt walked out of unit safely

## 2022-12-05 ENCOUNTER — OFFICE VISIT (OUTPATIENT)
Dept: INTERNAL MEDICINE CLINIC | Facility: CLINIC | Age: 52
End: 2022-12-05

## 2022-12-05 VITALS
SYSTOLIC BLOOD PRESSURE: 110 MMHG | WEIGHT: 198.2 LBS | HEART RATE: 76 BPM | OXYGEN SATURATION: 98 % | BODY MASS INDEX: 26.27 KG/M2 | TEMPERATURE: 98.8 F | HEIGHT: 73 IN | DIASTOLIC BLOOD PRESSURE: 70 MMHG

## 2022-12-05 DIAGNOSIS — B02.8 HERPES ZOSTER WITH OTHER COMPLICATION: Primary | ICD-10-CM

## 2022-12-05 PROBLEM — B02.9 SHINGLES: Status: ACTIVE | Noted: 2022-12-05

## 2022-12-05 RX ORDER — FAMCICLOVIR 500 MG/1
500 TABLET, FILM COATED ORAL 3 TIMES DAILY
Qty: 21 TABLET | Refills: 0 | Status: SHIPPED | OUTPATIENT
Start: 2022-12-05 | End: 2022-12-12

## 2022-12-05 NOTE — PROGRESS NOTES
Assessment/Plan:      Depression Screening and Follow-up Plan: Patient was screened for depression during today's encounter  They screened negative with a PHQ-2 score of 0             1  Herpes zoster with other complication  Comments:  left T7 segment  Orders:  -     famciclovir (FAMVIR) 500 mg tablet; Take 1 tablet (500 mg total) by mouth 3 (three) times a day for 7 days         Subjective:      Patient ID: Benedict Angelucci is a 47 y o  male  Rash on left side of the belly, painful, feels feverish      The following portions of the patient's history were reviewed and updated as appropriate: He  has a past medical history of Lymph nodes enlarged, Lymphoma ([facility] [address] ), and Spleen enlarged  He   Patient Active Problem List    Diagnosis Date Noted   • Shingles 12/05/2022   • Bleeding hemorrhoids 03/22/2021   • COVID-19 virus RNA detected 12/14/2020   • Exposure to SARS-associated coronavirus 12/10/2020   • Fatigue 12/10/2020   • Lymphadenopathy 09/10/2020   • Grade 3a follicular lymphoma of lymph nodes of multiple regions ([facility] [address] ) 04/29/2019     He  has a past surgical history that includes Dental surgery; Colonoscopy; Nasal septum surgery; Portacath placement; Lymph node biopsy (Right, 9/22/2020); pr hemorrhoidopexy by stapling (N/A, 4/27/2021); and pr hemorrhoidectomy,int/ext, 2+ columns/groups (N/A, 4/27/2021)  His family history is not on file  He  reports that he has never smoked  He has never used smokeless tobacco  He reports that he does not currently use alcohol after a past usage of about 2 0 standard drinks per week  He reports that he does not use drugs  Current Outpatient Medications   Medication Sig Dispense Refill   • famciclovir (FAMVIR) 500 mg tablet Take 1 tablet (500 mg total) by mouth 3 (three) times a day for 7 days 21 tablet 0     No current facility-administered medications for this visit  No current outpatient medications on file prior to visit       No current facility-administered medications on file prior to visit  He has No Known Allergies       Review of Systems   Constitutional: Negative for chills and fever  HENT: Negative for congestion, ear pain and sore throat  Eyes: Negative for pain  Respiratory: Negative for cough and shortness of breath  Cardiovascular: Negative for chest pain and leg swelling  Gastrointestinal: Negative for abdominal pain, nausea and vomiting  Endocrine: Negative for polyuria  Genitourinary: Negative for difficulty urinating, frequency and urgency  Musculoskeletal: Negative for arthralgias and back pain  Skin: Positive for rash  Neurological: Negative for weakness and headaches  Psychiatric/Behavioral: Negative for sleep disturbance  The patient is not nervous/anxious  Objective:      /70 (BP Location: Right arm, Patient Position: Sitting, Cuff Size: Standard)   Pulse 76   Temp 98 8 °F (37 1 °C) (Temporal)   Ht 6' 1" (1 854 m)   Wt 89 9 kg (198 lb 3 2 oz)   SpO2 98%   BMI 26 15 kg/m²     Recent Results (from the past 1344 hour(s))   APTT    Collection Time: 11/08/22  2:33 PM   Result Value Ref Range    PTT 29 23 - 37 seconds   Protime-INR    Collection Time: 11/08/22  2:33 PM   Result Value Ref Range    Protime 14 0 11 6 - 14 5 seconds    INR 1 06 0 84 - 1 19        Physical Exam  Constitutional:       Appearance: Normal appearance  HENT:      Head: Normocephalic  Right Ear: Tympanic membrane, ear canal and external ear normal       Left Ear: Tympanic membrane, ear canal and external ear normal       Nose: Nose normal  No congestion  Mouth/Throat:      Mouth: Mucous membranes are moist       Pharynx: Oropharynx is clear  No oropharyngeal exudate or posterior oropharyngeal erythema  Eyes:      Extraocular Movements: Extraocular movements intact  Conjunctiva/sclera: Conjunctivae normal       Pupils: Pupils are equal, round, and reactive to light     Cardiovascular:      Rate and Rhythm: Normal rate and regular rhythm  Heart sounds: Normal heart sounds  No murmur heard  Pulmonary:      Effort: Pulmonary effort is normal       Breath sounds: Normal breath sounds  No wheezing or rales  Abdominal:      General: Bowel sounds are normal  There is no distension  Palpations: Abdomen is soft  Tenderness: There is no abdominal tenderness  Musculoskeletal:         General: Normal range of motion  Cervical back: Normal range of motion and neck supple  Right lower leg: No edema  Left lower leg: No edema  Lymphadenopathy:      Cervical: No cervical adenopathy  Skin:     General: Skin is warm  Comments: Left T7 segment vesicular rash present, erythema present   Neurological:      General: No focal deficit present  Mental Status: He is alert and oriented to person, place, and time

## 2022-12-06 ENCOUNTER — TELEPHONE (OUTPATIENT)
Dept: INTERNAL MEDICINE CLINIC | Facility: CLINIC | Age: 52
End: 2022-12-06

## 2022-12-06 DIAGNOSIS — B02.8 HERPES ZOSTER WITH OTHER COMPLICATION: Primary | ICD-10-CM

## 2022-12-06 RX ORDER — GABAPENTIN 300 MG/1
300 CAPSULE ORAL DAILY
Qty: 30 CAPSULE | Refills: 0 | Status: SHIPPED | OUTPATIENT
Start: 2022-12-06

## 2022-12-06 NOTE — TELEPHONE ENCOUNTER
Pt in a lot of pain and discomfort  Pt would like prescription for pain that Dr Reyes Minus recommended yesterday   Pt would like a phone call once prescription is sent

## 2022-12-06 NOTE — TELEPHONE ENCOUNTER
Called patient and patient is not under any active cancer chemotherapy   Let pt know medication was sent to Pharmacy

## 2022-12-06 NOTE — TELEPHONE ENCOUNTER
Prescriptions for gabapentin sent  Please call and confirm with the patient that he is not undergoing any active cancer chemotherapy    I also left him a voicemail

## 2023-01-05 DIAGNOSIS — B02.8 HERPES ZOSTER WITH OTHER COMPLICATION: ICD-10-CM

## 2023-01-05 RX ORDER — GABAPENTIN 300 MG/1
600 CAPSULE ORAL
Qty: 60 CAPSULE | Refills: 0 | Status: SHIPPED | OUTPATIENT
Start: 2023-01-05

## 2023-01-05 RX ORDER — VALACYCLOVIR HYDROCHLORIDE 1 G/1
1000 TABLET, FILM COATED ORAL 3 TIMES DAILY
Qty: 21 TABLET | Refills: 0 | Status: SHIPPED | OUTPATIENT
Start: 2023-01-05 | End: 2023-01-12

## 2023-01-06 ENCOUNTER — LAB REQUISITION (OUTPATIENT)
Dept: LAB | Facility: HOSPITAL | Age: 53
End: 2023-01-06

## 2023-01-06 ENCOUNTER — HOSPITAL ENCOUNTER (OUTPATIENT)
Dept: INFUSION CENTER | Facility: CLINIC | Age: 53
Discharge: HOME | End: 2023-01-06

## 2023-01-06 DIAGNOSIS — C82.33 FOLLICULAR LYMPHOMA GRADE IIIA, INTRA-ABDOMINAL LYMPH NODES (HCC): ICD-10-CM

## 2023-01-06 DIAGNOSIS — C82.38 GRADE 3A FOLLICULAR LYMPHOMA OF LYMPH NODES OF MULTIPLE REGIONS (HCC): Primary | ICD-10-CM

## 2023-01-06 LAB
ALBUMIN SERPL BCP-MCNC: 3.8 G/DL (ref 3.5–5)
ALP SERPL-CCNC: 113 U/L (ref 34–104)
ALT SERPL W P-5'-P-CCNC: 6 U/L (ref 7–52)
ANION GAP SERPL CALCULATED.3IONS-SCNC: 7 MMOL/L (ref 4–13)
AST SERPL W P-5'-P-CCNC: 15 U/L (ref 13–39)
BASOPHILS # BLD AUTO: 0.07 THOUSANDS/ÂΜL (ref 0–0.1)
BASOPHILS NFR BLD AUTO: 1 % (ref 0–1)
BILIRUB SERPL-MCNC: 0.7 MG/DL (ref 0.2–1)
BUN SERPL-MCNC: 19 MG/DL (ref 5–25)
CALCIUM SERPL-MCNC: 8.8 MG/DL (ref 8.4–10.2)
CHLORIDE SERPL-SCNC: 105 MMOL/L (ref 96–108)
CO2 SERPL-SCNC: 28 MMOL/L (ref 21–32)
CREAT SERPL-MCNC: 0.95 MG/DL (ref 0.6–1.3)
EOSINOPHIL # BLD AUTO: 0.62 THOUSAND/ÂΜL (ref 0–0.61)
EOSINOPHIL NFR BLD AUTO: 8 % (ref 0–6)
ERYTHROCYTE [DISTWIDTH] IN BLOOD BY AUTOMATED COUNT: 14.2 % (ref 11.6–15.1)
GFR SERPL CREATININE-BSD FRML MDRD: 91 ML/MIN/1.73SQ M
GLUCOSE SERPL-MCNC: 96 MG/DL (ref 65–140)
HCT VFR BLD AUTO: 37.3 % (ref 36.5–49.3)
HGB BLD-MCNC: 12.2 G/DL (ref 12–17)
IMM GRANULOCYTES # BLD AUTO: 0.07 THOUSAND/UL (ref 0–0.2)
IMM GRANULOCYTES NFR BLD AUTO: 1 % (ref 0–2)
LDH SERPL-CCNC: 109 U/L (ref 140–271)
LYMPHOCYTES # BLD AUTO: 1.42 THOUSANDS/ÂΜL (ref 0.6–4.47)
LYMPHOCYTES NFR BLD AUTO: 18 % (ref 14–44)
MCH RBC QN AUTO: 27.7 PG (ref 26.8–34.3)
MCHC RBC AUTO-ENTMCNC: 32.7 G/DL (ref 31.4–37.4)
MCV RBC AUTO: 85 FL (ref 82–98)
MONOCYTES # BLD AUTO: 0.79 THOUSAND/ÂΜL (ref 0.17–1.22)
MONOCYTES NFR BLD AUTO: 10 % (ref 4–12)
NEUTROPHILS # BLD AUTO: 5.05 THOUSANDS/ÂΜL (ref 1.85–7.62)
NEUTS SEG NFR BLD AUTO: 62 % (ref 43–75)
NRBC BLD AUTO-RTO: 0 /100 WBCS
PLATELET # BLD AUTO: 235 THOUSANDS/UL (ref 149–390)
PMV BLD AUTO: 9.8 FL (ref 8.9–12.7)
POTASSIUM SERPL-SCNC: 4.1 MMOL/L (ref 3.5–5.3)
PROT SERPL-MCNC: 5.9 G/DL (ref 6.4–8.4)
RBC # BLD AUTO: 4.41 MILLION/UL (ref 3.88–5.62)
SODIUM SERPL-SCNC: 140 MMOL/L (ref 135–147)
WBC # BLD AUTO: 8.02 THOUSAND/UL (ref 4.31–10.16)

## 2023-01-18 ENCOUNTER — TELEPHONE (OUTPATIENT)
Dept: HEMATOLOGY ONCOLOGY | Facility: CLINIC | Age: 53
End: 2023-01-18

## 2023-01-18 ENCOUNTER — OFFICE VISIT (OUTPATIENT)
Dept: HEMATOLOGY ONCOLOGY | Facility: CLINIC | Age: 53
End: 2023-01-18

## 2023-01-18 VITALS
DIASTOLIC BLOOD PRESSURE: 80 MMHG | BODY MASS INDEX: 26.9 KG/M2 | SYSTOLIC BLOOD PRESSURE: 102 MMHG | WEIGHT: 203 LBS | HEART RATE: 84 BPM | HEIGHT: 73 IN | TEMPERATURE: 98.5 F | OXYGEN SATURATION: 98 %

## 2023-01-18 DIAGNOSIS — D80.1 HYPOGAMMAGLOBULINEMIA (HCC): ICD-10-CM

## 2023-01-18 DIAGNOSIS — T45.1X5A CHEMOTHERAPY-INDUCED NAUSEA: ICD-10-CM

## 2023-01-18 DIAGNOSIS — D70.1 CHEMOTHERAPY INDUCED NEUTROPENIA (HCC): ICD-10-CM

## 2023-01-18 DIAGNOSIS — T45.1X5A CHEMOTHERAPY-INDUCED NAUSEA: Primary | ICD-10-CM

## 2023-01-18 DIAGNOSIS — T45.1X5A CHEMOTHERAPY INDUCED NEUTROPENIA (HCC): ICD-10-CM

## 2023-01-18 DIAGNOSIS — C82.38 GRADE 3A FOLLICULAR LYMPHOMA OF LYMPH NODES OF MULTIPLE REGIONS (HCC): Primary | ICD-10-CM

## 2023-01-18 DIAGNOSIS — R11.0 CHEMOTHERAPY-INDUCED NAUSEA: Primary | ICD-10-CM

## 2023-01-18 DIAGNOSIS — R11.0 CHEMOTHERAPY-INDUCED NAUSEA: ICD-10-CM

## 2023-01-18 DIAGNOSIS — B02.8 HERPES ZOSTER WITH OTHER COMPLICATION: ICD-10-CM

## 2023-01-18 DIAGNOSIS — C82.38 GRADE 3A FOLLICULAR LYMPHOMA OF LYMPH NODES OF MULTIPLE REGIONS (HCC): ICD-10-CM

## 2023-01-18 RX ORDER — ALLOPURINOL 300 MG/1
300 TABLET ORAL DAILY
Qty: 30 TABLET | Refills: 1 | Status: SHIPPED | OUTPATIENT
Start: 2023-01-18

## 2023-01-18 RX ORDER — ACETAMINOPHEN 325 MG/1
650 TABLET ORAL ONCE
Status: CANCELLED | OUTPATIENT
Start: 2023-01-20

## 2023-01-18 RX ORDER — DIPHENHYDRAMINE HCL 25 MG
50 TABLET ORAL ONCE
Status: CANCELLED | OUTPATIENT
Start: 2023-01-20

## 2023-01-18 RX ORDER — ONDANSETRON 4 MG/1
4 TABLET, FILM COATED ORAL EVERY 8 HOURS PRN
Qty: 20 TABLET | Refills: 0 | Status: CANCELLED | OUTPATIENT
Start: 2023-01-18

## 2023-01-18 RX ORDER — VALACYCLOVIR HYDROCHLORIDE 1 G/1
1000 TABLET, FILM COATED ORAL 3 TIMES DAILY
Qty: 42 TABLET | Refills: 0 | Status: SHIPPED | OUTPATIENT
Start: 2023-01-18 | End: 2023-02-01

## 2023-01-18 RX ORDER — ONDANSETRON 4 MG/1
4 TABLET, FILM COATED ORAL EVERY 8 HOURS PRN
Qty: 20 TABLET | Refills: 0 | Status: SHIPPED | OUTPATIENT
Start: 2023-01-18

## 2023-01-18 RX ORDER — SODIUM CHLORIDE 9 MG/ML
20 INJECTION, SOLUTION INTRAVENOUS ONCE
Status: CANCELLED | OUTPATIENT
Start: 2023-01-20

## 2023-01-18 RX ORDER — ALLOPURINOL 300 MG/1
300 TABLET ORAL DAILY
Qty: 30 TABLET | Refills: 1 | Status: CANCELLED | OUTPATIENT
Start: 2023-01-18

## 2023-01-18 NOTE — PROGRESS NOTES
Hematology Outpatient Follow - Up Note  Cecilia Grimm 47 y o  male MRN: @ Encounter: [id number]        Date:  1/18/2023        Assessment/ Plan:      50-year-old  male with history of stage IV follicular lymphoma grade 3B, biopsy proven 5/2017 when he presented with weight loss, splenomegaly, constitutional symptoms, multiple lymph nodes above and below the diaphragm  Bone marrow biopsy 6/2017 showed 10% involvement with follicular lymphoma  He was treated with R-CHOP for total of 6 cycles spanning from May 2017 until September 2017  Subsequent PET scan showed no evidence of disease and no evidence of splenomegaly or lymphadenopathy     He received maintenance rituximab 375 milligram/meter squared every 2 months for total of 2 years finished in October 2019      Relapse of disease by PET scan in September 2020 which showed innumerable bilateral cervical, axillary, mediastinal, retroperitoneal, bilateral iliac, inguinal lymph nodes, splenic involvement      Excisional biopsy of a right inguinal lymph node 0/95/66 SGVDDV relapsed follicular lymphoma, no evidence of transformation, positive for CD 19, CD 20, CD 10, Ki-67 about 60%  Recurrence of disease by PET scan on 7/2022, constitutional symptoms in December 2022 requiring inguinal biopsy showed follicular lymphoma SY-77 about 10% positive for Bcl-2, BCL6, CD20, negative for CD5, CD10, cyclin D1    Evaluated at [address] multiple option were offered, including CAR-T, bispecifics, Bendamustine/anti-CD20    He is symptomatic with recurrent herpes zoster    1  Valtrex 500 mg p o  3 times daily for 14 days  2  We will check IgG level and replace with IVIG  3    Patient to receive obinutuzumab 1000 mg flat dose and day 1, 8, 15 and then monthly thereafter along with Bendamustine 90 mg per metered squared on days 1 and 2 every month for total of 6 cycles    He never received any Bendamustine previously    Reactivation of herpes zoster, PCP, opportunistic infection, after finishing induction Valtrex patient to receive acyclovir 400 mg p o  twice daily    We might consider Bactrim 3 times a week    Allopurinol 300 mg p o  daily for 1 month    IVIG 400 mg/kg every month    Labs and imaging studies are reviewed by ordering provider once results are available  If there are findings that need immediate attention, you will be contacted when results available  Discussing results and the implication on your healthcare is best discussed in person at your follow-up visit  HPI:    Richy Mtz a 47 y  o  male with follicular lymphoma   He presented in 2016 with splenomegaly, weight loss, constitutional symptoms      Multiple enlarged lymph nodes noted above and below the diaphragm  Excisional biopsy 5/2017 of 1 of the retroperitoneal lymph node confirmed the diagnosis     Bone marrow biopsy 6/2/2017 showed 10 percent involvement with follicular lymphoma      No evidence of transformation, 2nd opinion at [address] [place] [address] agreed on treatment with R-CHOP     He received 6 cycles of R-CHOP spanning from May 2017 until September 2017      Subsequent PET scan showed no evidence of disease and physical examination showed disappearance of splenomegaly and lymphadenopathy     Maintenance rituximab 375 milligram/meter squared every 2 months for total of 2 years finished in October 2019     PET scan in January 2020 showed uptake in the right inguinal area however patient did not have any constitutional symptoms  He had normal CBC, CMP, LDH we decided to watch and observe   At that time physical examination showed 1 5 cm left inguinal lymph node     Repeat PET scan in March 2020 showed uptake with SUV between 3 and 5 in the mediastinum, right perihilar area, no evidence of uptake in the abdomen or pelvis specially no uptake in the left inguinal area     In June 2020 cough intermittent without sputum production especially in the midthoracic area, denies any constitutional symptoms, CBC, CMP, LDH are normal     Repeat PET scan on 09/03/2020 showed progression of disease with innumerable new glucose avid lymph nodes throughout the neck, chest, retroperitoneal, iliac, inguinal regions, splenic uptake, no evidence of bulky disease with score of 5, mild increase in the spleen size        Status post excision biopsy of the right inguinal lymph node on 09/22/2020  A  Right inguinal lymph node:  - Follicular lymphoma, follicular pattern, grade 3A (average of 17 centroblasts/HPF) - see Note     - Flow cytometry ([facility]#[id number], evaluated by Dr Karyna Donovan) reveals:   * CD10+ B-cell lymphoma, comprising 70% of total cells analyzed, of small to medium size with following immunophenotype:    -- positive: CD19, CD20, CD10, CD38, sKappa    -- negative: sLambda, CD5, CD11c, CD23   * Viability 7AAD: 89%    * The following antigens were evaluated & found to be expressed as described above or by appropriate cells:  CD2, CD3, CD4, CD5, CD7, CD8, CD10, CD11c, CD19, CD20, CD23, CD38, CD45, CD56, CD57, FMC7, sKappa, sLambda      There was no evidence of transformation, he was kept on watchful observation      COVID-19 infection in December 2020 with full recovery     On 06/2022 he felt abdominal pressure after drinking beer, by physical examination he was found to have splenomegaly, PET scan showed multiple lymphadenopathy in the cervical, supra and infraclavicular, axillary, abdominal, mediastinal, inguinal area the largest in the retroperitoneal area up to 7 cm, spleen is enlarged as well    Repeat biopsy of the inguinal area at [address] showed low-grade follicular lymphoma LE-08 of 10% positive for CD20, PAX5, Bcl-2, BCL6, CD10, negative for CD5, CD23, cyclin D1    EZH2 is pending      Interval History:    Herpes zoster involving the left dermatome area received Valtrex for 1 week with reactivation of herpes zoster and more new areas involvement of the upper chest area    Previous Treatment:         Test Results:    Imaging: No results found  Labs:   Lab Results   Component Value Date    WBC 8 02 01/06/2023    HGB 12 2 01/06/2023    HCT 37 3 01/06/2023    MCV 85 01/06/2023     01/06/2023     Lab Results   Component Value Date     08/16/2017    K 4 1 01/06/2023     01/06/2023    CO2 28 01/06/2023    BUN 19 01/06/2023    CREATININE 0 95 01/06/2023    GLUF 106 (H) 09/29/2017    CALCIUM 8 8 01/06/2023    CORRECTEDCA 9 2 12/30/2020    AST 15 01/06/2023    ALT 6 (L) 01/06/2023    ALKPHOS 113 (H) 01/06/2023    PROT 6 3 08/16/2017    BILITOT 0 5 08/16/2017    EGFR 91 01/06/2023       Lab Results   Component Value Date    IRON 103 07/01/2019    FERRITIN 187 07/01/2019       No results found for: [id number]      ROS: Review of Systems   Constitutional: Positive for fatigue and fever  Negative for appetite change, chills, diaphoresis and unexpected weight change  HENT:   Negative for hearing loss, lump/mass, mouth sores, nosebleeds, sore throat, trouble swallowing and voice change  Eyes: Negative  Negative for eye problems and icterus  Respiratory: Negative  Negative for chest tightness, cough, hemoptysis and shortness of breath  Cardiovascular: Negative for chest pain and leg swelling  Gastrointestinal: Positive for abdominal distention  Negative for abdominal pain, blood in stool, constipation, diarrhea and nausea  Endocrine: Negative  Genitourinary: Negative for dysuria, frequency, hematuria and pelvic pain  Musculoskeletal: Positive for gait problem  Negative for arthralgias, back pain, flank pain, myalgias and neck stiffness  Skin: Positive for rash (Relapse of herpes zoster on the anterior chest wall area despite Valtrex for 1 week)  Negative for itching  Neurological: Positive for gait problem  Negative for dizziness, headaches, light-headedness, numbness and speech difficulty  Hematological: Negative for adenopathy   Does not bruise/bleed easily  Psychiatric/Behavioral: Negative for confusion, decreased concentration, depression and sleep disturbance  The patient is nervous/anxious  Current Medications: Reviewed  Allergies: Reviewed  PMH/FH/SH:  Reviewed      Physical Exam:    Body surface area is 2 17 meters squared  Wt Readings from Last 3 Encounters:   01/18/23 92 1 kg (203 lb)   12/05/22 89 9 kg (198 lb 3 2 oz)   09/26/22 91 2 kg (201 lb)        Temp Readings from Last 3 Encounters:   01/18/23 98 5 °F (36 9 °C) (Temporal)   12/05/22 98 8 °F (37 1 °C) (Temporal)   09/26/22 98 1 °F (36 7 °C)        BP Readings from Last 3 Encounters:   01/18/23 102/80   12/05/22 110/70   09/26/22 138/72         Pulse Readings from Last 3 Encounters:   01/18/23 84   12/05/22 76   09/26/22 66        Physical Exam  Vitals reviewed  Constitutional:       General: He is not in acute distress  Appearance: He is well-developed  He is not diaphoretic  HENT:      Head: Normocephalic and atraumatic  Eyes:      Conjunctiva/sclera: Conjunctivae normal    Neck:      Trachea: No tracheal deviation  Cardiovascular:      Rate and Rhythm: Normal rate and regular rhythm  Heart sounds: No murmur heard  No friction rub  No gallop  Pulmonary:      Effort: Pulmonary effort is normal  No respiratory distress  Breath sounds: Normal breath sounds  No wheezing or rales  Chest:      Chest wall: No tenderness  Abdominal:      General: There is distension  Palpations: Abdomen is soft  Tenderness: There is no abdominal tenderness  Musculoskeletal:      Cervical back: Normal range of motion and neck supple  Lymphadenopathy:      Cervical: No cervical adenopathy  Skin:     General: Skin is warm and dry  Coloration: Skin is not pale  Findings: Rash (Herpes zoster on the anterior chest wall area) present  No erythema  Neurological:      Mental Status: He is alert and oriented to person, place, and time  Psychiatric:         Behavior: Behavior normal          Thought Content: Thought content normal          Judgment: Judgment normal          ECO    Goals and Barriers:  Current Goal: Minimize effects of disease  Barriers: None  Patient's Capacity to Self Care:  Patient is able to self care      Code Status: [unfilled]

## 2023-01-18 NOTE — TELEPHONE ENCOUNTER
Are you able to add patient on for IVIG this week? If not can you see if [facility] can? Thank you!

## 2023-01-18 NOTE — TELEPHONE ENCOUNTER
Patient needs IVIG ASAP  Dr Ermelinda Lora would like it this  Week    For [place] or [facility]    For chemo he would like to go  To [facility] then [facility]   Would like labs from port

## 2023-01-18 NOTE — TELEPHONE ENCOUNTER
While we try to accommodate patient requests, our priority is to schedule treatment according to Doctor's orders and site availability  Does the Provider use the intake sheet or checkout note? What would be a preferred day of the week that would work best for your infusion appointment? MON/ TUES  Do you prefer mornings or afternoons for your appointments? EARLY MORNING  Are there any days or dates that do not work for your schedule, including any upcoming vacations? 1/23, 1/24, 1/25  We are going to try our best to schedule you at the infusion center closest to your home  In the event that we are unable to what would be your next preferred infusion site or sites? 1  [facility]   2  [facility]  3  [facility]    Do you have transportation to take you to all of your appointments?  YES  Would you like the infusion center to draw labs from your port? (disregard if patient doesn't have a port or need labs for infusion appointment) YES

## 2023-01-18 NOTE — TELEPHONE ENCOUNTER
Please note chemo appt duration may need to be adjusted as Josefina Bonilla was added to plan   Thank you

## 2023-01-19 ENCOUNTER — TELEPHONE (OUTPATIENT)
Dept: HEMATOLOGY ONCOLOGY | Facility: CLINIC | Age: 53
End: 2023-01-19

## 2023-01-19 ENCOUNTER — APPOINTMENT (OUTPATIENT)
Dept: LAB | Age: 53
End: 2023-01-19

## 2023-01-19 DIAGNOSIS — D80.1 HYPOGAMMAGLOBULINEMIA (HCC): Primary | ICD-10-CM

## 2023-01-19 DIAGNOSIS — D80.1 HYPOGAMMAGLOBULINEMIA (HCC): ICD-10-CM

## 2023-01-19 LAB
IGA SERPL-MCNC: 52 MG/DL (ref 70–400)
IGG SERPL-MCNC: 543 MG/DL (ref 700–1600)
IGM SERPL-MCNC: 94 MG/DL (ref 40–230)

## 2023-01-19 RX ORDER — DIPHENHYDRAMINE HCL 25 MG
50 TABLET ORAL ONCE
Status: CANCELLED | OUTPATIENT
Start: 2023-01-26

## 2023-01-19 RX ORDER — ACETAMINOPHEN 325 MG/1
650 TABLET ORAL ONCE
Status: CANCELLED | OUTPATIENT
Start: 2023-01-26

## 2023-01-19 RX ORDER — SODIUM CHLORIDE 9 MG/ML
20 INJECTION, SOLUTION INTRAVENOUS ONCE
Status: CANCELLED | OUTPATIENT
Start: 2023-01-26

## 2023-01-19 NOTE — TELEPHONE ENCOUNTER
Labs still pending, authorization unable to be obtained for IVIG tomorrow  Spoke with patient he reports he will be on vacation Monday - Wednesday of next week  Will reach out to infusion to schedule for Thursday

## 2023-01-19 NOTE — TELEPHONE ENCOUNTER
Pt asking to confirm okay to start chemo 1/31 while on valtrex  Okay per Dr Kris Vora and pt notified

## 2023-01-19 NOTE — TELEPHONE ENCOUNTER
Spoke with pt and let him know insurance needs IGG level before approving IVIG  Pt will have drawn today  [facility] updated

## 2023-01-19 NOTE — TELEPHONE ENCOUNTER
Please change date for tx to 1/31  Patient also has some questions that need to be addressed  [place], can you please call patient to discuss?

## 2023-01-19 NOTE — TELEPHONE ENCOUNTER
Message left for patient that txs have been scheduled out  Patient uses [employer] for appt details  My TEAMS number was left for patient to call back if he has any questions regarding his appts

## 2023-01-20 ENCOUNTER — HOSPITAL ENCOUNTER (OUTPATIENT)
Dept: INFUSION CENTER | Facility: CLINIC | Age: 53
Discharge: HOME | End: 2023-01-20

## 2023-01-23 DIAGNOSIS — T45.1X5A CHEMOTHERAPY INDUCED NEUTROPENIA (HCC): Primary | ICD-10-CM

## 2023-01-23 DIAGNOSIS — C82.38 GRADE 3A FOLLICULAR LYMPHOMA OF LYMPH NODES OF MULTIPLE REGIONS (HCC): ICD-10-CM

## 2023-01-23 DIAGNOSIS — D70.1 CHEMOTHERAPY INDUCED NEUTROPENIA (HCC): Primary | ICD-10-CM

## 2023-01-23 DIAGNOSIS — D80.1 HYPOGAMMAGLOBULINEMIA (HCC): ICD-10-CM

## 2023-01-23 NOTE — TELEPHONE ENCOUNTER
Patient is aware of schedule update  He still feels like his cycle 2 should be scheduled further out than what is currently is  Please advise or call patient to discuss  Thank you!

## 2023-01-24 DIAGNOSIS — C82.38 GRADE 3A FOLLICULAR LYMPHOMA OF LYMPH NODES OF MULTIPLE REGIONS (HCC): Primary | ICD-10-CM

## 2023-01-24 RX ORDER — ACETAMINOPHEN 325 MG/1
650 TABLET ORAL ONCE
Status: CANCELLED | OUTPATIENT
Start: 2023-02-01

## 2023-01-24 RX ORDER — ACETAMINOPHEN 325 MG/1
650 TABLET ORAL ONCE
Status: CANCELLED | OUTPATIENT
Start: 2023-02-07

## 2023-01-24 RX ORDER — ACETAMINOPHEN 325 MG/1
650 TABLET ORAL ONCE
Status: CANCELLED | OUTPATIENT
Start: 2023-01-31

## 2023-01-24 RX ORDER — SODIUM CHLORIDE 9 MG/ML
20 INJECTION, SOLUTION INTRAVENOUS ONCE
Status: CANCELLED | OUTPATIENT
Start: 2023-02-14

## 2023-01-24 RX ORDER — ACETAMINOPHEN 325 MG/1
650 TABLET ORAL ONCE
Status: CANCELLED | OUTPATIENT
Start: 2023-02-14

## 2023-01-24 RX ORDER — SODIUM CHLORIDE 9 MG/ML
20 INJECTION, SOLUTION INTRAVENOUS ONCE
Status: CANCELLED | OUTPATIENT
Start: 2023-02-07

## 2023-01-24 RX ORDER — SODIUM CHLORIDE 9 MG/ML
20 INJECTION, SOLUTION INTRAVENOUS ONCE
Status: CANCELLED | OUTPATIENT
Start: 2023-02-01

## 2023-01-24 RX ORDER — SODIUM CHLORIDE 9 MG/ML
20 INJECTION, SOLUTION INTRAVENOUS ONCE
Status: CANCELLED | OUTPATIENT
Start: 2023-01-31

## 2023-01-27 ENCOUNTER — HOSPITAL ENCOUNTER (OUTPATIENT)
Dept: INFUSION CENTER | Facility: CLINIC | Age: 53
End: 2023-01-27

## 2023-01-27 VITALS
SYSTOLIC BLOOD PRESSURE: 123 MMHG | WEIGHT: 201.72 LBS | TEMPERATURE: 98.6 F | HEART RATE: 84 BPM | BODY MASS INDEX: 26.61 KG/M2 | RESPIRATION RATE: 18 BRPM | DIASTOLIC BLOOD PRESSURE: 78 MMHG

## 2023-01-27 DIAGNOSIS — D70.1 CHEMOTHERAPY INDUCED NEUTROPENIA (HCC): ICD-10-CM

## 2023-01-27 DIAGNOSIS — D80.1 HYPOGAMMAGLOBULINEMIA (HCC): Primary | ICD-10-CM

## 2023-01-27 DIAGNOSIS — T45.1X5A CHEMOTHERAPY INDUCED NEUTROPENIA (HCC): ICD-10-CM

## 2023-01-27 DIAGNOSIS — C82.38 GRADE 3A FOLLICULAR LYMPHOMA OF LYMPH NODES OF MULTIPLE REGIONS (HCC): ICD-10-CM

## 2023-01-27 LAB
ALBUMIN SERPL BCP-MCNC: 3.8 G/DL (ref 3.5–5)
ALP SERPL-CCNC: 127 U/L (ref 34–104)
ALT SERPL W P-5'-P-CCNC: 8 U/L (ref 7–52)
ANION GAP SERPL CALCULATED.3IONS-SCNC: 7 MMOL/L (ref 4–13)
AST SERPL W P-5'-P-CCNC: 16 U/L (ref 13–39)
BASOPHILS # BLD AUTO: 0.09 THOUSANDS/ÂΜL (ref 0–0.1)
BASOPHILS NFR BLD AUTO: 1 % (ref 0–1)
BILIRUB SERPL-MCNC: 0.76 MG/DL (ref 0.2–1)
BUN SERPL-MCNC: 19 MG/DL (ref 5–25)
CALCIUM SERPL-MCNC: 8.6 MG/DL (ref 8.4–10.2)
CHLORIDE SERPL-SCNC: 106 MMOL/L (ref 96–108)
CO2 SERPL-SCNC: 25 MMOL/L (ref 21–32)
CREAT SERPL-MCNC: 0.96 MG/DL (ref 0.6–1.3)
EOSINOPHIL # BLD AUTO: 0.58 THOUSAND/ÂΜL (ref 0–0.61)
EOSINOPHIL NFR BLD AUTO: 7 % (ref 0–6)
ERYTHROCYTE [DISTWIDTH] IN BLOOD BY AUTOMATED COUNT: 16.5 % (ref 11.6–15.1)
GFR SERPL CREATININE-BSD FRML MDRD: 89 ML/MIN/1.73SQ M
GLUCOSE SERPL-MCNC: 94 MG/DL (ref 65–140)
HCT VFR BLD AUTO: 37.8 % (ref 36.5–49.3)
HGB BLD-MCNC: 12.1 G/DL (ref 12–17)
IMM GRANULOCYTES # BLD AUTO: 0.03 THOUSAND/UL (ref 0–0.2)
IMM GRANULOCYTES NFR BLD AUTO: 0 % (ref 0–2)
LYMPHOCYTES # BLD AUTO: 1.4 THOUSANDS/ÂΜL (ref 0.6–4.47)
LYMPHOCYTES NFR BLD AUTO: 17 % (ref 14–44)
MCH RBC QN AUTO: 27.6 PG (ref 26.8–34.3)
MCHC RBC AUTO-ENTMCNC: 32 G/DL (ref 31.4–37.4)
MCV RBC AUTO: 86 FL (ref 82–98)
MONOCYTES # BLD AUTO: 0.84 THOUSAND/ÂΜL (ref 0.17–1.22)
MONOCYTES NFR BLD AUTO: 10 % (ref 4–12)
NEUTROPHILS # BLD AUTO: 5.56 THOUSANDS/ÂΜL (ref 1.85–7.62)
NEUTS SEG NFR BLD AUTO: 65 % (ref 43–75)
NRBC BLD AUTO-RTO: 0 /100 WBCS
PLATELET # BLD AUTO: 256 THOUSANDS/UL (ref 149–390)
PMV BLD AUTO: 10.3 FL (ref 8.9–12.7)
POTASSIUM SERPL-SCNC: 4.1 MMOL/L (ref 3.5–5.3)
PROT SERPL-MCNC: 5.9 G/DL (ref 6.4–8.4)
RBC # BLD AUTO: 4.39 MILLION/UL (ref 3.88–5.62)
SODIUM SERPL-SCNC: 138 MMOL/L (ref 135–147)
WBC # BLD AUTO: 8.5 THOUSAND/UL (ref 4.31–10.16)

## 2023-01-27 RX ORDER — SODIUM CHLORIDE 9 MG/ML
20 INJECTION, SOLUTION INTRAVENOUS ONCE
Status: COMPLETED | OUTPATIENT
Start: 2023-01-27 | End: 2023-01-27

## 2023-01-27 RX ORDER — DIPHENHYDRAMINE HCL 25 MG
50 TABLET ORAL ONCE
Status: COMPLETED | OUTPATIENT
Start: 2023-01-27 | End: 2023-01-27

## 2023-01-27 RX ORDER — DIPHENHYDRAMINE HCL 25 MG
50 TABLET ORAL ONCE
OUTPATIENT
Start: 2023-02-02

## 2023-01-27 RX ORDER — ACETAMINOPHEN 325 MG/1
650 TABLET ORAL ONCE
Status: COMPLETED | OUTPATIENT
Start: 2023-01-27 | End: 2023-01-27

## 2023-01-27 RX ORDER — ACETAMINOPHEN 325 MG/1
650 TABLET ORAL ONCE
OUTPATIENT
Start: 2023-02-02

## 2023-01-27 RX ORDER — SODIUM CHLORIDE 9 MG/ML
20 INJECTION, SOLUTION INTRAVENOUS ONCE
OUTPATIENT
Start: 2023-02-02

## 2023-01-27 RX ADMIN — Medication 30 G: at 09:05

## 2023-01-27 RX ADMIN — ACETAMINOPHEN 650 MG: 325 TABLET ORAL at 08:34

## 2023-01-27 RX ADMIN — SODIUM CHLORIDE 20 ML/HR: 0.9 INJECTION, SOLUTION INTRAVENOUS at 08:37

## 2023-01-27 RX ADMIN — DIPHENHYDRAMINE HCL 50 MG: 25 TABLET, COATED ORAL at 08:34

## 2023-01-27 NOTE — PLAN OF CARE
Problem: Knowledge Deficit  Goal: Patient/family/caregiver demonstrates understanding of disease process, treatment plan, medications, and discharge instructions  Description: Complete learning assessment and assess knowledge base    Interventions:  - Provide teaching at level of understanding  - Provide teaching via preferred learning methods  Outcome: Progressing normal... Well appearing, well nourished, awake, alert, oriented to person, place, time/situation and in no apparent distress.

## 2023-01-27 NOTE — PROGRESS NOTES
Pt presents for IVIG  No new meds or concerns  Pt tolerated infusion without adverse reaction  Future appointments discussed  AVS declined

## 2023-01-31 ENCOUNTER — HOSPITAL ENCOUNTER (OUTPATIENT)
Dept: INFUSION CENTER | Facility: HOSPITAL | Age: 53
Discharge: HOME | End: 2023-01-31
Attending: INTERNAL MEDICINE

## 2023-01-31 VITALS
WEIGHT: 197 LBS | RESPIRATION RATE: 17 BRPM | DIASTOLIC BLOOD PRESSURE: 56 MMHG | TEMPERATURE: 99.4 F | BODY MASS INDEX: 26.11 KG/M2 | SYSTOLIC BLOOD PRESSURE: 108 MMHG | HEIGHT: 73 IN | OXYGEN SATURATION: 90 % | HEART RATE: 109 BPM

## 2023-01-31 DIAGNOSIS — C82.38 GRADE 3A FOLLICULAR LYMPHOMA OF LYMPH NODES OF MULTIPLE REGIONS (HCC): ICD-10-CM

## 2023-01-31 DIAGNOSIS — T45.1X5A CHEMOTHERAPY INDUCED NEUTROPENIA (HCC): ICD-10-CM

## 2023-01-31 DIAGNOSIS — D70.1 CHEMOTHERAPY INDUCED NEUTROPENIA (HCC): ICD-10-CM

## 2023-01-31 DIAGNOSIS — D70.1 CHEMOTHERAPY INDUCED NEUTROPENIA: Primary | ICD-10-CM

## 2023-01-31 DIAGNOSIS — D80.1 HYPOGAMMAGLOBULINEMIA (HCC): ICD-10-CM

## 2023-01-31 DIAGNOSIS — T45.1X5A CHEMOTHERAPY INDUCED NEUTROPENIA: Primary | ICD-10-CM

## 2023-01-31 DIAGNOSIS — D80.1 HYPOGAMMAGLOBULINEMIA (HCC): Primary | ICD-10-CM

## 2023-01-31 RX ORDER — ACETAMINOPHEN 325 MG/1
650 TABLET ORAL ONCE
Status: COMPLETED | OUTPATIENT
Start: 2023-01-31 | End: 2023-01-31

## 2023-01-31 RX ORDER — ACETAMINOPHEN 325 MG/1
650 TABLET ORAL ONCE
Status: CANCELLED
Start: 2023-01-31 | End: 2023-01-31

## 2023-01-31 RX ORDER — ALBUTEROL SULFATE 2.5 MG/3ML
2.5 SOLUTION RESPIRATORY (INHALATION) ONCE
Status: DISCONTINUED | OUTPATIENT
Start: 2023-01-31 | End: 2023-02-04 | Stop reason: HOSPADM

## 2023-01-31 RX ORDER — FAMOTIDINE 10 MG/ML
20 INJECTION, SOLUTION INTRAVENOUS EVERY 12 HOURS SCHEDULED
Status: DISCONTINUED | OUTPATIENT
Start: 2023-01-31 | End: 2023-02-04 | Stop reason: HOSPADM

## 2023-01-31 RX ORDER — DIPHENHYDRAMINE HYDROCHLORIDE 50 MG/ML
50 INJECTION INTRAMUSCULAR; INTRAVENOUS ONCE
Status: COMPLETED | OUTPATIENT
Start: 2023-01-31 | End: 2023-01-31

## 2023-01-31 RX ORDER — DIPHENHYDRAMINE HYDROCHLORIDE 50 MG/ML
25 INJECTION INTRAMUSCULAR; INTRAVENOUS
Status: ACTIVE | OUTPATIENT
Start: 2023-01-31 | End: 2023-01-31

## 2023-01-31 RX ORDER — FAMOTIDINE 10 MG/ML
INJECTION, SOLUTION INTRAVENOUS
Status: COMPLETED
Start: 2023-01-31 | End: 2023-01-31

## 2023-01-31 RX ORDER — MEPERIDINE HYDROCHLORIDE 25 MG/ML
25 INJECTION INTRAMUSCULAR; INTRAVENOUS; SUBCUTANEOUS ONCE
Status: DISCONTINUED | OUTPATIENT
Start: 2023-01-31 | End: 2023-02-04 | Stop reason: HOSPADM

## 2023-01-31 RX ORDER — MEPERIDINE HYDROCHLORIDE 25 MG/ML
25 INJECTION INTRAMUSCULAR; INTRAVENOUS; SUBCUTANEOUS ONCE
Status: CANCELLED
Start: 2023-01-31 | End: 2023-01-31

## 2023-01-31 RX ORDER — SODIUM CHLORIDE 9 MG/ML
20 INJECTION, SOLUTION INTRAVENOUS ONCE
Status: COMPLETED | OUTPATIENT
Start: 2023-01-31 | End: 2023-01-31

## 2023-01-31 RX ADMIN — ACETAMINOPHEN 650 MG: 325 TABLET ORAL at 08:37

## 2023-01-31 RX ADMIN — FAMOTIDINE 20 MG: 10 INJECTION, SOLUTION INTRAVENOUS at 11:50

## 2023-01-31 RX ADMIN — SODIUM CHLORIDE 20 ML/HR: 0.9 INJECTION, SOLUTION INTRAVENOUS at 08:38

## 2023-01-31 RX ADMIN — DIPHENHYDRAMINE HYDROCHLORIDE 25 MG: 50 INJECTION, SOLUTION INTRAMUSCULAR; INTRAVENOUS at 14:26

## 2023-01-31 RX ADMIN — DIPHENHYDRAMINE HYDROCHLORIDE 50 MG: 50 INJECTION, SOLUTION INTRAMUSCULAR; INTRAVENOUS at 11:48

## 2023-01-31 RX ADMIN — HYDROCORTISONE SODIUM SUCCINATE 50 MG: 100 INJECTION, POWDER, FOR SOLUTION INTRAMUSCULAR; INTRAVENOUS at 14:27

## 2023-01-31 RX ADMIN — HYDROCORTISONE SODIUM SUCCINATE 100 MG: 100 INJECTION, POWDER, FOR SOLUTION INTRAMUSCULAR; INTRAVENOUS at 11:49

## 2023-01-31 RX ADMIN — SODIUM CHLORIDE 500 ML: 0.9 INJECTION, SOLUTION INTRAVENOUS at 14:26

## 2023-01-31 RX ADMIN — OBINUTUZUMAB 1000 MG: 1000 INJECTION, SOLUTION, CONCENTRATE INTRAVENOUS at 10:38

## 2023-01-31 RX ADMIN — DEXAMETHASONE SODIUM PHOSPHATE 20 MG: 10 INJECTION INTRAMUSCULAR; INTRAVENOUS at 09:01

## 2023-01-31 RX ADMIN — FAMOTIDINE 20 MG: 10 INJECTION INTRAVENOUS at 14:29

## 2023-01-31 RX ADMIN — ACETAMINOPHEN 650 MG: 325 TABLET ORAL at 14:39

## 2023-01-31 RX ADMIN — DIPHENHYDRAMINE HYDROCHLORIDE 25 MG: 50 INJECTION, SOLUTION INTRAMUSCULAR; INTRAVENOUS at 08:38

## 2023-01-31 NOTE — PROGRESS NOTES
At [phone] patient notified nursing staff he felt shaking chills and mild SOB  Infusion held, hypersensitivity protocol initiated  Patient's vitals remained stable throughout event  Shaking chills and symptoms resolved around 1220, Demerol 25mg to be held per Dr Carmenza Rick  Per order, restarted Gazyva infusion at previous rate prior to reaction  Double checked infusion with Saurav Aaron RN

## 2023-01-31 NOTE — PROGRESS NOTES
Lidia Arroyo RN reports patients shivers resolved prior to Demerol, it was not given  Reviewed with Dr Karri Hackett, per him okay to resume Caodaism Redder at rate prior to reaction if symptoms resolve and vitals are stable  Iva Bauer reports vitals are stable and symptoms resolved

## 2023-01-31 NOTE — PROGRESS NOTES
At 1409 patient notified nursing staff his face, ears, and lips were itchy and numb  Upon assessment patient appeared pink/red in the face  Infusion stopped, Dr Dale Tejada notified  Vitals stable, although patient febrile  Per Dr Dale Tejada give hypersensitivity protocol again, as well as ordered tylenol  Hypersensitivity protocol given, symptoms resolved  Per Dr Dale Tejada, patient is not to receive rest of Gazyva infusion and will not receive Bendeka today  Bendeka to be given tomorrow and Thursday per plan adjusted by Nayana Mcnair RN  Patient and family notified and aware  Patient to be observed til 1630 per office RN    5745- Patient denied symptoms at this time  Patient SPO2 90% on RA without symptoms, Dr Leon Hong office made aware  States OK for patient to go home unless patient would feel more comfortable going to the ER for further observation  Discussed with patient and family member, they decided to go home  Educated them that if he feels worse at all he must go to the ER immediately  Verbalized understanding  Confirmed appointment for tomorrow

## 2023-01-31 NOTE — PROGRESS NOTES
At 1409 patient notified nursing staff his face, ears, and lips were itchy and numb  Upon assessment patient appeared pink/red in the face  Infusion stopped, Dr Howie Boyd notified  Vitals stable, although patient febrile  Per Dr Howie Boyd give hypersensitivity protocol again, as well as ordered tylenol  Hypersensitivity protocol given, symptoms resolved  VSS throughout duration of incident  Per Dr Howie Boyd, patient is not to receive rest of Gazyva infusion and will not receive Bendeka today  Bendeka to be given tomorrow and Thursday per plan adjusted by Soledad Gandhi RN  Patient and family notified and aware

## 2023-01-31 NOTE — PROGRESS NOTES
Received message from Andrea Ferraro RN reporting during [place] pt had reaction  Per her patient presented with shaking chills, mild SOB  Protocol was given for SOB, vitals stable, patient still has shaking  Reviewed with Dr Audery Lesches per him Demerol 25 mg to be given  Andrea Ferraro RN notified

## 2023-02-01 ENCOUNTER — HOSPITAL ENCOUNTER (OUTPATIENT)
Dept: INFUSION CENTER | Facility: HOSPITAL | Age: 53
Discharge: HOME | End: 2023-02-01
Attending: INTERNAL MEDICINE

## 2023-02-01 ENCOUNTER — TELEPHONE (OUTPATIENT)
Dept: HEMATOLOGY ONCOLOGY | Facility: CLINIC | Age: 53
End: 2023-02-01

## 2023-02-01 VITALS
RESPIRATION RATE: 18 BRPM | TEMPERATURE: 98.9 F | HEIGHT: 73 IN | SYSTOLIC BLOOD PRESSURE: 108 MMHG | BODY MASS INDEX: 26.11 KG/M2 | DIASTOLIC BLOOD PRESSURE: 73 MMHG | WEIGHT: 197 LBS | HEART RATE: 81 BPM

## 2023-02-01 DIAGNOSIS — D80.1 HYPOGAMMAGLOBULINEMIA (HCC): Primary | ICD-10-CM

## 2023-02-01 DIAGNOSIS — C82.38 GRADE 3A FOLLICULAR LYMPHOMA OF LYMPH NODES OF MULTIPLE REGIONS (HCC): ICD-10-CM

## 2023-02-01 DIAGNOSIS — T45.1X5A CHEMOTHERAPY INDUCED NEUTROPENIA (HCC): ICD-10-CM

## 2023-02-01 DIAGNOSIS — D70.1 CHEMOTHERAPY INDUCED NEUTROPENIA (HCC): ICD-10-CM

## 2023-02-01 RX ORDER — SODIUM CHLORIDE 9 MG/ML
20 INJECTION, SOLUTION INTRAVENOUS ONCE
Status: COMPLETED | OUTPATIENT
Start: 2023-02-01 | End: 2023-02-01

## 2023-02-01 RX ORDER — SODIUM CHLORIDE 9 MG/ML
20 INJECTION, SOLUTION INTRAVENOUS ONCE
Status: CANCELLED | OUTPATIENT
Start: 2023-02-02

## 2023-02-01 RX ORDER — ACETAMINOPHEN 325 MG/1
650 TABLET ORAL ONCE
Status: CANCELLED | OUTPATIENT
Start: 2023-02-07

## 2023-02-01 RX ORDER — ACETAMINOPHEN 325 MG/1
650 TABLET ORAL ONCE
Status: CANCELLED | OUTPATIENT
Start: 2023-02-14

## 2023-02-01 RX ADMIN — BENDAMUSTINE HYDROCHLORIDE 195.3 MG: 25 INJECTION, SOLUTION INTRAVENOUS at 10:26

## 2023-02-01 RX ADMIN — SODIUM CHLORIDE 20 ML/HR: 0.9 INJECTION, SOLUTION INTRAVENOUS at 09:52

## 2023-02-01 RX ADMIN — DEXAMETHASONE SODIUM PHOSPHATE: 10 INJECTION, SOLUTION INTRAMUSCULAR; INTRAVENOUS at 09:52

## 2023-02-01 NOTE — TELEPHONE ENCOUNTER
Message left for patient to call back to confirm schedule update  My TEAMS number was left for call back

## 2023-02-01 NOTE — TELEPHONE ENCOUNTER
[facility]: [facility]  Pt does not want to be treated at [facility]  Please notify patient with updated appointments  Thank you

## 2023-02-01 NOTE — PROGRESS NOTES
Patient here for first bendeka  States he had no issues after chemo yesterday  Tolerated bendeka without complications, confirmed appt back tomorrow for day 2 bendeka

## 2023-02-02 ENCOUNTER — HOSPITAL ENCOUNTER (OUTPATIENT)
Dept: INFUSION CENTER | Facility: HOSPITAL | Age: 53
End: 2023-02-02

## 2023-02-02 VITALS
BODY MASS INDEX: 26.11 KG/M2 | WEIGHT: 196.98 LBS | RESPIRATION RATE: 18 BRPM | DIASTOLIC BLOOD PRESSURE: 70 MMHG | TEMPERATURE: 97.7 F | SYSTOLIC BLOOD PRESSURE: 112 MMHG | HEIGHT: 73 IN | HEART RATE: 56 BPM

## 2023-02-02 DIAGNOSIS — C82.38 GRADE 3A FOLLICULAR LYMPHOMA OF LYMPH NODES OF MULTIPLE REGIONS (HCC): ICD-10-CM

## 2023-02-02 DIAGNOSIS — T45.1X5A CHEMOTHERAPY INDUCED NEUTROPENIA (HCC): ICD-10-CM

## 2023-02-02 DIAGNOSIS — D80.1 HYPOGAMMAGLOBULINEMIA (HCC): Primary | ICD-10-CM

## 2023-02-02 DIAGNOSIS — D70.1 CHEMOTHERAPY INDUCED NEUTROPENIA (HCC): ICD-10-CM

## 2023-02-02 LAB
ALBUMIN SERPL BCP-MCNC: 3.5 G/DL (ref 3.5–5)
ALP SERPL-CCNC: 121 U/L (ref 43–122)
ALT SERPL W P-5'-P-CCNC: 12 U/L
ANION GAP SERPL CALCULATED.3IONS-SCNC: 5 MMOL/L (ref 5–14)
AST SERPL W P-5'-P-CCNC: 26 U/L (ref 17–59)
BASOPHILS # BLD AUTO: 0.02 THOUSANDS/ÂΜL (ref 0–0.1)
BASOPHILS NFR BLD AUTO: 0 % (ref 0–1)
BILIRUB SERPL-MCNC: 0.41 MG/DL (ref 0.2–1)
BUN SERPL-MCNC: 22 MG/DL (ref 5–25)
CALCIUM SERPL-MCNC: 9.1 MG/DL (ref 8.4–10.2)
CHLORIDE SERPL-SCNC: 109 MMOL/L (ref 96–108)
CO2 SERPL-SCNC: 25 MMOL/L (ref 21–32)
CREAT SERPL-MCNC: 1.02 MG/DL (ref 0.7–1.5)
EOSINOPHIL # BLD AUTO: 0.05 THOUSAND/ÂΜL (ref 0–0.61)
EOSINOPHIL NFR BLD AUTO: 1 % (ref 0–6)
ERYTHROCYTE [DISTWIDTH] IN BLOOD BY AUTOMATED COUNT: 16.7 % (ref 11.6–15.1)
GFR SERPL CREATININE-BSD FRML MDRD: 82 ML/MIN/1.73SQ M
GLUCOSE P FAST SERPL-MCNC: 86 MG/DL (ref 70–99)
GLUCOSE SERPL-MCNC: 86 MG/DL (ref 70–99)
HCT VFR BLD AUTO: 34.1 % (ref 36.5–49.3)
HGB BLD-MCNC: 11.1 G/DL (ref 12–17)
IMM GRANULOCYTES # BLD AUTO: 0.03 THOUSAND/UL (ref 0–0.2)
IMM GRANULOCYTES NFR BLD AUTO: 1 % (ref 0–2)
LYMPHOCYTES # BLD AUTO: 0.46 THOUSANDS/ÂΜL (ref 0.6–4.47)
LYMPHOCYTES NFR BLD AUTO: 7 % (ref 14–44)
MCH RBC QN AUTO: 27.8 PG (ref 26.8–34.3)
MCHC RBC AUTO-ENTMCNC: 32.6 G/DL (ref 31.4–37.4)
MCV RBC AUTO: 85 FL (ref 82–98)
MONOCYTES # BLD AUTO: 0.26 THOUSAND/ÂΜL (ref 0.17–1.22)
MONOCYTES NFR BLD AUTO: 4 % (ref 4–12)
NEUTROPHILS # BLD AUTO: 5.44 THOUSANDS/ÂΜL (ref 1.85–7.62)
NEUTS SEG NFR BLD AUTO: 87 % (ref 43–75)
NRBC BLD AUTO-RTO: 0 /100 WBCS
PLATELET # BLD AUTO: 210 THOUSANDS/UL (ref 149–390)
PMV BLD AUTO: 9.3 FL (ref 8.9–12.7)
POTASSIUM SERPL-SCNC: 3.8 MMOL/L (ref 3.5–5.3)
PROT SERPL-MCNC: 6.1 G/DL (ref 6.4–8.4)
RBC # BLD AUTO: 4 MILLION/UL (ref 3.88–5.62)
SODIUM SERPL-SCNC: 139 MMOL/L (ref 135–147)
WBC # BLD AUTO: 6.26 THOUSAND/UL (ref 4.31–10.16)

## 2023-02-02 RX ORDER — SODIUM CHLORIDE 9 MG/ML
20 INJECTION, SOLUTION INTRAVENOUS ONCE
Status: COMPLETED | OUTPATIENT
Start: 2023-02-02 | End: 2023-02-02

## 2023-02-02 RX ADMIN — PEGFILGRASTIM 6 MG: KIT SUBCUTANEOUS at 10:50

## 2023-02-02 RX ADMIN — SODIUM CHLORIDE 20 ML/HR: 0.9 INJECTION, SOLUTION INTRAVENOUS at 09:54

## 2023-02-02 RX ADMIN — BENDAMUSTINE HYDROCHLORIDE 195.3 MG: 25 INJECTION, SOLUTION INTRAVENOUS at 10:37

## 2023-02-02 RX ADMIN — DEXAMETHASONE SODIUM PHOSPHATE: 10 INJECTION, SOLUTION INTRAMUSCULAR; INTRAVENOUS at 09:57

## 2023-02-02 NOTE — PROGRESS NOTES
Pt tolerated Bendeka infusion and application of Neulasta Onpro to R arm without difficulty  No s/s reaction noted  Port flushed and deaccessed per protocol  Aware of time to remove Onpro tomorrow  Next appt confirmed  AVS declined  Left ambulatory in stable condition

## 2023-02-07 ENCOUNTER — HOSPITAL ENCOUNTER (OUTPATIENT)
Dept: INFUSION CENTER | Facility: HOSPITAL | Age: 53
Discharge: HOME | End: 2023-02-07
Attending: INTERNAL MEDICINE

## 2023-02-07 ENCOUNTER — DOCUMENTATION (OUTPATIENT)
Dept: HEMATOLOGY ONCOLOGY | Facility: CLINIC | Age: 53
End: 2023-02-07

## 2023-02-07 VITALS
TEMPERATURE: 98.5 F | HEART RATE: 74 BPM | RESPIRATION RATE: 16 BRPM | DIASTOLIC BLOOD PRESSURE: 73 MMHG | SYSTOLIC BLOOD PRESSURE: 121 MMHG | OXYGEN SATURATION: 95 %

## 2023-02-07 DIAGNOSIS — T45.1X5A CHEMOTHERAPY INDUCED NEUTROPENIA (HCC): ICD-10-CM

## 2023-02-07 DIAGNOSIS — D70.1 CHEMOTHERAPY INDUCED NEUTROPENIA (HCC): ICD-10-CM

## 2023-02-07 DIAGNOSIS — D80.1 HYPOGAMMAGLOBULINEMIA (HCC): Primary | ICD-10-CM

## 2023-02-07 DIAGNOSIS — C82.38 GRADE 3A FOLLICULAR LYMPHOMA OF LYMPH NODES OF MULTIPLE REGIONS (HCC): ICD-10-CM

## 2023-02-07 RX ORDER — FAMOTIDINE 10 MG/ML
20 INJECTION, SOLUTION INTRAVENOUS EVERY 12 HOURS SCHEDULED
Status: DISCONTINUED | OUTPATIENT
Start: 2023-02-07 | End: 2023-02-11 | Stop reason: HOSPADM

## 2023-02-07 RX ORDER — ACETAMINOPHEN 325 MG/1
650 TABLET ORAL ONCE
Status: COMPLETED | OUTPATIENT
Start: 2023-02-07 | End: 2023-02-07

## 2023-02-07 RX ORDER — DIPHENHYDRAMINE HYDROCHLORIDE 50 MG/ML
25 INJECTION INTRAMUSCULAR; INTRAVENOUS
Status: ACTIVE | OUTPATIENT
Start: 2023-02-07 | End: 2023-02-07

## 2023-02-07 RX ORDER — SODIUM CHLORIDE 9 MG/ML
20 INJECTION, SOLUTION INTRAVENOUS ONCE
Status: COMPLETED | OUTPATIENT
Start: 2023-02-07 | End: 2023-02-07

## 2023-02-07 RX ORDER — FAMOTIDINE 10 MG/ML
INJECTION, SOLUTION INTRAVENOUS
Status: COMPLETED
Start: 2023-02-07 | End: 2023-02-07

## 2023-02-07 RX ADMIN — HYDROCORTISONE SODIUM SUCCINATE 50 MG: 100 INJECTION, POWDER, FOR SOLUTION INTRAMUSCULAR; INTRAVENOUS at 13:25

## 2023-02-07 RX ADMIN — OBINUTUZUMAB 1000 MG: 1000 INJECTION, SOLUTION, CONCENTRATE INTRAVENOUS at 10:16

## 2023-02-07 RX ADMIN — DEXAMETHASONE SODIUM PHOSPHATE 20 MG: 10 INJECTION, SOLUTION INTRAMUSCULAR; INTRAVENOUS at 08:52

## 2023-02-07 RX ADMIN — SODIUM CHLORIDE 500 ML: 0.9 INJECTION, SOLUTION INTRAVENOUS at 12:06

## 2023-02-07 RX ADMIN — DIPHENHYDRAMINE HYDROCHLORIDE 25 MG: 50 INJECTION, SOLUTION INTRAMUSCULAR; INTRAVENOUS at 08:32

## 2023-02-07 RX ADMIN — FAMOTIDINE 20 MG: 10 INJECTION INTRAVENOUS at 12:06

## 2023-02-07 RX ADMIN — SODIUM CHLORIDE 20 ML/HR: 0.9 INJECTION, SOLUTION INTRAVENOUS at 08:29

## 2023-02-07 RX ADMIN — FAMOTIDINE 20 MG: 10 INJECTION, SOLUTION INTRAVENOUS at 12:06

## 2023-02-07 RX ADMIN — DIPHENHYDRAMINE HYDROCHLORIDE 25 MG: 50 INJECTION, SOLUTION INTRAMUSCULAR; INTRAVENOUS at 12:02

## 2023-02-07 RX ADMIN — HYDROCORTISONE SODIUM SUCCINATE 50 MG: 100 INJECTION, POWDER, FOR SOLUTION INTRAMUSCULAR; INTRAVENOUS at 12:04

## 2023-02-07 RX ADMIN — FAMOTIDINE 20 MG: 10 INJECTION INTRAVENOUS at 13:26

## 2023-02-07 RX ADMIN — SODIUM CHLORIDE 500 ML: 0.9 INJECTION, SOLUTION INTRAVENOUS at 13:25

## 2023-02-07 RX ADMIN — ACETAMINOPHEN 650 MG: 325 TABLET ORAL at 08:32

## 2023-02-07 RX ADMIN — DIPHENHYDRAMINE HYDROCHLORIDE 25 MG: 50 INJECTION, SOLUTION INTRAMUSCULAR; INTRAVENOUS at 13:24

## 2023-02-07 NOTE — PROGRESS NOTES
Patient here for day 8 gazyva, ordered to run at first time rate again since reacted last week  Patient was 2 1/2 hours into gazyva at 192mL/hr rate and started itching with redness in his face  Ace Rink put on hold, hypersensitivity protocol given: bendaryl 25mg, solucortef 50mg, pepcid 20mg  Patient felt relief in a couple minutes  Spoke with Pamela Harvey RN and per Dr Rajeev Nuñez, may restart at previous tolerated rate  Fredanie Grew restarted and patient started to become red and itchy again only a half hour into it, which was at the 192mL/hr rate again  Stopped gazyva and same hypersensitivity protocol given as before  Per Tj Ivory, stop gazyva for today  Patient will follow up with Dr Rajeev Nuñez tomorrow morning at [facility] [address] Pending [facility] to [facility]

## 2023-02-07 NOTE — PROGRESS NOTES
Spoke with Vance Arredondo RN who repots patient was about 2 1/2 hours into his Gazyva and started with red skin and itching all over  She administered the protocol  She reports patient's symptoms are resolving  Reviewed information with Dr Kris Vora, per him okay to re challenge at rate previous to reaction if patients feeling better and vitals WNL  Vance Arredondo RN notified  She verbalized understanding

## 2023-02-07 NOTE — PROGRESS NOTES
Michelle Davis RN reports patient reacted again with same symptoms 8 mL into previous rate of reaction  Reviewed with Dr Dale Tejada, per him no more Gazyva until see in follow up  Pt was r/s to see Dr Dale Tejada tomorrow at 8:40 AM  Michelle Davis RN notified, she will update patient with appointment

## 2023-02-08 ENCOUNTER — TELEPHONE (OUTPATIENT)
Dept: HEMATOLOGY ONCOLOGY | Facility: CLINIC | Age: 53
End: 2023-02-08

## 2023-02-08 ENCOUNTER — OFFICE VISIT (OUTPATIENT)
Dept: HEMATOLOGY ONCOLOGY | Facility: CLINIC | Age: 53
End: 2023-02-08

## 2023-02-08 ENCOUNTER — TELEPHONE (OUTPATIENT)
Dept: OTHER | Facility: OTHER | Age: 53
End: 2023-02-08

## 2023-02-08 VITALS
RESPIRATION RATE: 17 BRPM | BODY MASS INDEX: 25.58 KG/M2 | OXYGEN SATURATION: 98 % | DIASTOLIC BLOOD PRESSURE: 78 MMHG | HEART RATE: 71 BPM | HEIGHT: 73 IN | SYSTOLIC BLOOD PRESSURE: 126 MMHG | WEIGHT: 193 LBS

## 2023-02-08 DIAGNOSIS — C82.38 GRADE 3A FOLLICULAR LYMPHOMA OF LYMPH NODES OF MULTIPLE REGIONS (HCC): Primary | ICD-10-CM

## 2023-02-08 RX ORDER — VALACYCLOVIR HYDROCHLORIDE 500 MG/1
TABLET, FILM COATED ORAL
COMMUNITY
Start: 2023-02-05

## 2023-02-08 NOTE — PROGRESS NOTES
Hematology Outpatient Follow - Up Note  John Thomas 46 y o  male MRN: @ Encounter: [id number]        Date:  2/8/2023        Assessment/ Plan:     history of stage IV follicular lymphoma grade 3B, biopsy proven 5/2017 when he presented with weight loss, splenomegaly, constitutional symptoms, multiple lymph nodes above and below the diaphragm  Bone marrow biopsy 6/2017 showed 10% involvement with follicular lymphoma  He was treated with R-CHOP for total of 6 cycles spanning from May 2017 until September 2017   Subsequent PET scan showed no evidence of disease and no evidence of splenomegaly or lymphadenopathy     He received maintenance rituximab 375 milligram/meter squared every 2 months for total of 2 years finished in October 2019      Relapse of disease by PET scan in September 2020 which showed innumerable bilateral cervical, axillary, mediastinal, retroperitoneal, bilateral iliac, inguinal lymph nodes, splenic involvement      Excisional biopsy of a right inguinal lymph node 9/12/96 YAARIR relapsed follicular lymphoma, no evidence of transformation, positive for CD 19, CD 20, CD 10, Ki-67 about 60%  Recurrence of disease by PET scan on 7/2022, constitutional symptoms in December 2022 requiring inguinal biopsy showed follicular lymphoma US-07 about 10% positive for Bcl-2, BCL6, CD20, negative for CD5, CD10, cyclin D1     Evaluated at [address] multiple option were offered, including CAR-T, bispecifics, Bendamustine/anti-CD20     He is symptomatic with recurrent herpes zoster  He received Valtrex for 14 days, initiated on obinutuzumab/Bendamustine, the first 2 obinutuzumab infusion complicated with allergic reaction manifested by skin rash, pruritus, flushing    However the second infusion intensity was much less than before    He has subjective response with disappearance of the cervical, axillary lymphadenopathy    We will continue with the treatment    We will check uric acid, CBC, CMP every week for the first month    Continue allopurinol 300 mg p o  daily    Continue Valtrex 500 mg p o  daily to prevent herpes zoster    Hypogammaglobinemia with herpes zoster, he is receiving IVIG 400 mg/kg every month        Labs and imaging studies are reviewed by ordering provider once results are available  If there are findings that need immediate attention, you will be contacted when results available  Discussing results and the implication on your healthcare is best discussed in person at your follow-up visit  HPI:    Jorge Florence a 46 y  o  male with follicular lymphoma   He presented in 2016 with splenomegaly, weight loss, constitutional symptoms      Multiple enlarged lymph nodes noted above and below the diaphragm  Excisional biopsy 5/2017 of 1 of the retroperitoneal lymph node confirmed the diagnosis     Bone marrow biopsy 6/2/2017 showed 10 percent involvement with follicular lymphoma      No evidence of transformation, 2nd opinion at [address] [place] [address] agreed on treatment with R-CHOP     He received 6 cycles of R-CHOP spanning from May 2017 until September 2017      Subsequent PET scan showed no evidence of disease and physical examination showed disappearance of splenomegaly and lymphadenopathy     Maintenance rituximab 375 milligram/meter squared every 2 months for total of 2 years finished in October 2019     PET scan in January 2020 showed uptake in the right inguinal area however patient did not have any constitutional symptoms  He had normal CBC, CMP, LDH we decided to watch and observe   At that time physical examination showed 1 5 cm left inguinal lymph node     Repeat PET scan in March 2020 showed uptake with SUV between 3 and 5 in the mediastinum, right perihilar area, no evidence of uptake in the abdomen or pelvis specially no uptake in the left inguinal area     In June 2020 cough intermittent without sputum production especially in the midthoracic area, denies any constitutional symptoms, CBC, CMP, LDH are normal     Repeat PET scan on 09/03/2020 showed progression of disease with innumerable new glucose avid lymph nodes throughout the neck, chest, retroperitoneal, iliac, inguinal regions, splenic uptake, no evidence of bulky disease with score of 5, mild increase in the spleen size        Status post excision biopsy of the right inguinal lymph node on 09/22/2020  A  Right inguinal lymph node:  - Follicular lymphoma, follicular pattern, grade 3A (average of 17 centroblasts/HPF) - see Note  - Flow cytometry ([facility]#[id number], evaluated by Dr Kishore Palafox) reveals:   * CD10+ B-cell lymphoma, comprising 70% of total cells analyzed, of small to medium size with following immunophenotype:    -- positive: CD19, CD20, CD10, CD38, sKappa    -- negative: sLambda, CD5, CD11c, CD23   * Viability 7AAD: 89%    * The following antigens were evaluated & found to be expressed as described above or by appropriate cells:  CD2, CD3, CD4, CD5, CD7, CD8, CD10, CD11c, CD19, CD20, CD23, CD38, CD45, CD56, CD57, FMC7, sKappa, sLambda      There was no evidence of transformation, he was kept on watchful observation      COVID-19 infection in December 2020 with full recovery     On 06/2022 he felt abdominal pressure after drinking beer, by physical examination he was found to have splenomegaly, PET scan showed multiple lymphadenopathy in the cervical, supra and infraclavicular, axillary, abdominal, mediastinal, inguinal area the largest in the retroperitoneal area up to 7 cm, spleen is enlarged as well     Repeat biopsy of the inguinal area at [address] showed low-grade follicular lymphoma RQ-91 of 10% positive for CD20, PAX5, Bcl-2, BCL6, CD10, negative for CD5, CD23, cyclin D1    EZH2 is pending     Interval History:        Previous Treatment:         Test Results:    Imaging: No results found      Labs:   Lab Results   Component Value Date    WBC 6 26 02/02/2023    HGB 11 1 (L) 02/02/2023    HCT 34 1 (L) 02/02/2023    MCV 85 02/02/2023     02/02/2023     Lab Results   Component Value Date     08/16/2017    K 3 8 02/02/2023     (H) 02/02/2023    CO2 25 02/02/2023    BUN 22 02/02/2023    CREATININE 1 02 02/02/2023    GLUF 86 02/02/2023    CALCIUM 9 1 02/02/2023    CORRECTEDCA 9 2 12/30/2020    AST 26 02/02/2023    ALT 12 02/02/2023    ALKPHOS 121 02/02/2023    PROT 6 3 08/16/2017    BILITOT 0 5 08/16/2017    EGFR 82 02/02/2023       Lab Results   Component Value Date    IRON 103 07/01/2019    FERRITIN 187 07/01/2019       No results found for: [id number]      ROS: Review of Systems   Constitutional: Negative  Negative for appetite change, chills, diaphoresis, fatigue, fever and unexpected weight change  HENT:   Negative for hearing loss, lump/mass, mouth sores, nosebleeds, sore throat, trouble swallowing and voice change  Eyes: Negative  Negative for eye problems and icterus  Respiratory: Negative  Negative for chest tightness, cough, hemoptysis and shortness of breath  Cardiovascular: Negative for chest pain and leg swelling  Gastrointestinal: Positive for abdominal distention  Negative for abdominal pain, blood in stool, constipation, diarrhea and nausea  Endocrine: Negative  Genitourinary: Negative for dysuria, frequency, hematuria and pelvic pain  Musculoskeletal: Negative  Negative for arthralgias, back pain, flank pain, gait problem, myalgias and neck stiffness  Skin: Positive for rash  Negative for itching  Neurological: Negative for dizziness, gait problem, headaches, light-headedness, numbness and speech difficulty  Hematological: Negative for adenopathy  Does not bruise/bleed easily  Psychiatric/Behavioral: Negative for confusion, decreased concentration, depression and sleep disturbance  The patient is not nervous/anxious             Current Medications: Reviewed  Allergies: Reviewed  PMH/FH/SH:  Reviewed      Physical Exam:    Body surface area is 2 12 meters squared  Wt Readings from Last 3 Encounters:   02/08/23 87 5 kg (193 lb)   02/02/23 89 4 kg (196 lb 15 7 oz)   02/01/23 89 4 kg (197 lb)        Temp Readings from Last 3 Encounters:   02/07/23 98 5 °F (36 9 °C) (Temporal)   02/02/23 97 7 °F (36 5 °C) (Temporal)   02/01/23 98 9 °F (37 2 °C) (Temporal)        BP Readings from Last 3 Encounters:   02/08/23 126/78   02/07/23 121/73   02/02/23 112/70         Pulse Readings from Last 3 Encounters:   02/08/23 71   02/07/23 74   02/02/23 56        Physical Exam  Vitals reviewed  Constitutional:       General: He is not in acute distress  Appearance: He is well-developed  He is ill-appearing  He is not diaphoretic  HENT:      Head: Normocephalic and atraumatic  Eyes:      Conjunctiva/sclera: Conjunctivae normal    Neck:      Trachea: No tracheal deviation  Cardiovascular:      Rate and Rhythm: Normal rate and regular rhythm  Heart sounds: No murmur heard  No friction rub  No gallop  Pulmonary:      Effort: Pulmonary effort is normal  No respiratory distress  Breath sounds: Normal breath sounds  No wheezing or rales  Chest:      Chest wall: No tenderness  Abdominal:      General: There is distension  Palpations: Abdomen is soft  There is fluid wave  Tenderness: There is no abdominal tenderness  Musculoskeletal:      Cervical back: Normal range of motion and neck supple  Right lower leg: No edema  Left lower leg: No edema  Lymphadenopathy:      Cervical: No cervical adenopathy (disppearance of the lymphadenopathy in the cervical, axillary area)  Skin:     General: Skin is warm and dry  Coloration: Skin is not pale  Findings: No erythema  Neurological:      Mental Status: He is alert and oriented to person, place, and time  Psychiatric:         Behavior: Behavior normal          Thought Content:  Thought content normal          Judgment: Judgment normal  ECO    Goals and Barriers:  Current Goal: Minimize effects of disease  Barriers: None  Patient's Capacity to Self Care:  Patient is able to self care      Code Status: @[facility]

## 2023-02-09 NOTE — TELEPHONE ENCOUNTER
Spoke with patient, he reports feeling great this morning, no rash or itching  He believes it was an allergic reaction to strawberries as the rash occurred right after eating them  He stated his daughter gets the same reaction from strawberries  Pt reported taking a benadryl last night which relieved everything and he hasn't had an issue since  Pt notified to call with any changes

## 2023-02-09 NOTE — TELEPHONE ENCOUNTER
Wife called regarding Mr Tasha Cruz developing itching on his head after receiving therapy yesterday  Patient has stage IV follicular lymphoma on Obinutuzumab + bendamustine  He had his regimen yesterday  Called the patient  He reports mild itching on his scalp and face  No rash  His wife was concerned and gave a call  Similar much more severe reaction in thepast for which he took benadryl in the past  Patient denies any SOB, rhinorea, post nasal discharge   etc  Advised patient to take his benadryl and monitor symptoms  If he develops symptoms such as sob or increase in severity, he may have to go to the ER  He agreed  Primary notified

## 2023-02-09 NOTE — TELEPHONE ENCOUNTER
Patient wife called saying that her  had an infusion treatment yesterday and now he's itchy all his head, face and neck

## 2023-02-10 ENCOUNTER — APPOINTMENT (OUTPATIENT)
Dept: LAB | Age: 53
End: 2023-02-10

## 2023-02-10 DIAGNOSIS — D70.1 CHEMOTHERAPY INDUCED NEUTROPENIA (HCC): ICD-10-CM

## 2023-02-10 DIAGNOSIS — C82.38 GRADE 3A FOLLICULAR LYMPHOMA OF LYMPH NODES OF MULTIPLE REGIONS (HCC): ICD-10-CM

## 2023-02-10 DIAGNOSIS — T45.1X5A CHEMOTHERAPY INDUCED NEUTROPENIA (HCC): ICD-10-CM

## 2023-02-10 DIAGNOSIS — D80.1 HYPOGAMMAGLOBULINEMIA (HCC): ICD-10-CM

## 2023-02-10 LAB
ALBUMIN SERPL BCP-MCNC: 3.5 G/DL (ref 3.5–5)
ALP SERPL-CCNC: 189 U/L (ref 46–116)
ALT SERPL W P-5'-P-CCNC: 13 U/L (ref 12–78)
ANION GAP SERPL CALCULATED.3IONS-SCNC: 4 MMOL/L (ref 4–13)
AST SERPL W P-5'-P-CCNC: 9 U/L (ref 5–45)
BASOPHILS # BLD AUTO: 0.1 THOUSANDS/ÂΜL (ref 0–0.1)
BASOPHILS NFR BLD AUTO: 1 % (ref 0–1)
BILIRUB SERPL-MCNC: 1.06 MG/DL (ref 0.2–1)
BUN SERPL-MCNC: 16 MG/DL (ref 5–25)
CALCIUM SERPL-MCNC: 8.9 MG/DL (ref 8.3–10.1)
CHLORIDE SERPL-SCNC: 104 MMOL/L (ref 96–108)
CO2 SERPL-SCNC: 28 MMOL/L (ref 21–32)
CREAT SERPL-MCNC: 0.93 MG/DL (ref 0.6–1.3)
EOSINOPHIL # BLD AUTO: 0.4 THOUSAND/ÂΜL (ref 0–0.61)
EOSINOPHIL NFR BLD AUTO: 3 % (ref 0–6)
ERYTHROCYTE [DISTWIDTH] IN BLOOD BY AUTOMATED COUNT: 17.4 % (ref 11.6–15.1)
GFR SERPL CREATININE-BSD FRML MDRD: 92 ML/MIN/1.73SQ M
GLUCOSE SERPL-MCNC: 105 MG/DL (ref 65–140)
HCT VFR BLD AUTO: 40.5 % (ref 36.5–49.3)
HGB BLD-MCNC: 13.4 G/DL (ref 12–17)
IMM GRANULOCYTES # BLD AUTO: 0.2 THOUSAND/UL (ref 0–0.2)
IMM GRANULOCYTES NFR BLD AUTO: 2 % (ref 0–2)
LDH SERPL-CCNC: 124 U/L (ref 81–234)
LYMPHOCYTES # BLD AUTO: 0.21 THOUSANDS/ÂΜL (ref 0.6–4.47)
LYMPHOCYTES NFR BLD AUTO: 2 % (ref 14–44)
MCH RBC QN AUTO: 28.5 PG (ref 26.8–34.3)
MCHC RBC AUTO-ENTMCNC: 33.1 G/DL (ref 31.4–37.4)
MCV RBC AUTO: 86 FL (ref 82–98)
MONOCYTES # BLD AUTO: 1.19 THOUSAND/ÂΜL (ref 0.17–1.22)
MONOCYTES NFR BLD AUTO: 9 % (ref 4–12)
NEUTROPHILS # BLD AUTO: 10.76 THOUSANDS/ÂΜL (ref 1.85–7.62)
NEUTS SEG NFR BLD AUTO: 83 % (ref 43–75)
NRBC BLD AUTO-RTO: 0 /100 WBCS
PLATELET # BLD AUTO: 208 THOUSANDS/UL (ref 149–390)
PMV BLD AUTO: 9.9 FL (ref 8.9–12.7)
POTASSIUM SERPL-SCNC: 3.6 MMOL/L (ref 3.5–5.3)
PROT SERPL-MCNC: 6.4 G/DL (ref 6.4–8.4)
RBC # BLD AUTO: 4.71 MILLION/UL (ref 3.88–5.62)
SODIUM SERPL-SCNC: 136 MMOL/L (ref 135–147)
URATE SERPL-MCNC: 3.7 MG/DL (ref 3.5–8.5)
WBC # BLD AUTO: 12.86 THOUSAND/UL (ref 4.31–10.16)

## 2023-02-14 ENCOUNTER — HOSPITAL ENCOUNTER (OUTPATIENT)
Dept: INFUSION CENTER | Facility: HOSPITAL | Age: 53
Discharge: HOME | End: 2023-02-14
Attending: INTERNAL MEDICINE

## 2023-02-14 VITALS
RESPIRATION RATE: 16 BRPM | DIASTOLIC BLOOD PRESSURE: 76 MMHG | OXYGEN SATURATION: 97 % | HEART RATE: 85 BPM | TEMPERATURE: 97.6 F | SYSTOLIC BLOOD PRESSURE: 120 MMHG

## 2023-02-14 DIAGNOSIS — C82.38 GRADE 3A FOLLICULAR LYMPHOMA OF LYMPH NODES OF MULTIPLE REGIONS (HCC): ICD-10-CM

## 2023-02-14 DIAGNOSIS — D70.1 CHEMOTHERAPY INDUCED NEUTROPENIA (HCC): ICD-10-CM

## 2023-02-14 DIAGNOSIS — T45.1X5A CHEMOTHERAPY INDUCED NEUTROPENIA (HCC): ICD-10-CM

## 2023-02-14 DIAGNOSIS — D80.1 HYPOGAMMAGLOBULINEMIA (HCC): Primary | ICD-10-CM

## 2023-02-14 RX ORDER — LORATADINE 10 MG/1
10 TABLET ORAL ONCE
Status: COMPLETED | OUTPATIENT
Start: 2023-02-14 | End: 2023-02-14

## 2023-02-14 RX ORDER — FAMOTIDINE 10 MG/ML
20 INJECTION, SOLUTION INTRAVENOUS EVERY 12 HOURS SCHEDULED
Status: DISCONTINUED | OUTPATIENT
Start: 2023-02-14 | End: 2023-02-18 | Stop reason: HOSPADM

## 2023-02-14 RX ORDER — LORATADINE 10 MG/1
10 TABLET ORAL ONCE
Status: CANCELLED
Start: 2023-02-14 | End: 2023-02-14

## 2023-02-14 RX ORDER — DIPHENHYDRAMINE HYDROCHLORIDE 50 MG/ML
25 INJECTION INTRAMUSCULAR; INTRAVENOUS EVERY 6 HOURS PRN
Status: DISCONTINUED | OUTPATIENT
Start: 2023-02-14 | End: 2023-02-18 | Stop reason: HOSPADM

## 2023-02-14 RX ORDER — DIPHENHYDRAMINE HYDROCHLORIDE 50 MG/ML
25 INJECTION INTRAMUSCULAR; INTRAVENOUS
Status: ACTIVE | OUTPATIENT
Start: 2023-02-14 | End: 2023-02-14

## 2023-02-14 RX ORDER — FAMOTIDINE 10 MG/ML
INJECTION, SOLUTION INTRAVENOUS
Status: COMPLETED
Start: 2023-02-14 | End: 2023-02-14

## 2023-02-14 RX ORDER — ACETAMINOPHEN 325 MG/1
650 TABLET ORAL ONCE
Status: COMPLETED | OUTPATIENT
Start: 2023-02-14 | End: 2023-02-14

## 2023-02-14 RX ORDER — DIPHENHYDRAMINE HYDROCHLORIDE 50 MG/ML
25 INJECTION INTRAMUSCULAR; INTRAVENOUS EVERY 6 HOURS PRN
Status: CANCELLED
Start: 2023-02-14

## 2023-02-14 RX ORDER — SODIUM CHLORIDE 9 MG/ML
20 INJECTION, SOLUTION INTRAVENOUS ONCE
Status: COMPLETED | OUTPATIENT
Start: 2023-02-14 | End: 2023-02-14

## 2023-02-14 RX ADMIN — FAMOTIDINE 20 MG: 10 INJECTION INTRAVENOUS at 12:06

## 2023-02-14 RX ADMIN — DIPHENHYDRAMINE HYDROCHLORIDE 25 MG: 50 INJECTION, SOLUTION INTRAMUSCULAR; INTRAVENOUS at 12:19

## 2023-02-14 RX ADMIN — SODIUM CHLORIDE 500 ML: 0.9 INJECTION, SOLUTION INTRAVENOUS at 12:07

## 2023-02-14 RX ADMIN — HYDROCORTISONE SODIUM SUCCINATE 50 MG: 100 INJECTION, POWDER, FOR SOLUTION INTRAMUSCULAR; INTRAVENOUS at 12:05

## 2023-02-14 RX ADMIN — LORATADINE 10 MG: 10 TABLET ORAL at 13:58

## 2023-02-14 RX ADMIN — OBINUTUZUMAB 1000 MG: 1000 INJECTION, SOLUTION, CONCENTRATE INTRAVENOUS at 09:52

## 2023-02-14 RX ADMIN — FAMOTIDINE 20 MG: 10 INJECTION, SOLUTION INTRAVENOUS at 12:06

## 2023-02-14 RX ADMIN — DIPHENHYDRAMINE HYDROCHLORIDE 25 MG: 50 INJECTION, SOLUTION INTRAMUSCULAR; INTRAVENOUS at 12:04

## 2023-02-14 RX ADMIN — DIPHENHYDRAMINE HYDROCHLORIDE 25 MG: 50 INJECTION, SOLUTION INTRAMUSCULAR; INTRAVENOUS at 08:46

## 2023-02-14 RX ADMIN — HYDROCORTISONE SODIUM SUCCINATE 50 MG: 100 INJECTION, POWDER, FOR SOLUTION INTRAMUSCULAR; INTRAVENOUS at 12:19

## 2023-02-14 RX ADMIN — ACETAMINOPHEN 650 MG: 325 TABLET ORAL at 08:28

## 2023-02-14 RX ADMIN — SODIUM CHLORIDE 20 ML/HR: 0.9 INJECTION, SOLUTION INTRAVENOUS at 08:23

## 2023-02-14 RX ADMIN — DEXAMETHASONE SODIUM PHOSPHATE 20 MG: 10 INJECTION, SOLUTION INTRAMUSCULAR; INTRAVENOUS at 08:24

## 2023-02-14 NOTE — PROGRESS NOTES
Pt finished rest of gazyva today  Patient continued with redness and itching  Per Dr Chester Yanez, pt ok to continue unless he has any respiratory symptoms which patient did not  Per Dr Chester Yanez, ok to discharge with redness and itching and no further intervention at this time  Next appt confirmed, AVS declined

## 2023-02-14 NOTE — PROGRESS NOTES
Spoke with Radha Cedillo, NORI and Dr Boob Mcdonald is aware of patient's bilirubin 1 06, proceed with treatment today

## 2023-02-14 NOTE — PROGRESS NOTES
[place] started, at [phone] it was stopped at 240mL/hr pt c/o itching and face was red  Hypersensitivity kit given benadryl 25mg, solucortef 50mg and pepcid 20mg  Spoke with Chacha Lal Rn and asked for additional meds since patient previous history of reacting again after rechallenge  Per Dr Anthony Herrera, ok to give extra benadryl 25mg (total 50mg) and solucortef 50mg (total 100mg)  Patient felt better, itching and redness resolved  Dr Anthony Herrera OK to restart at 144mL/hr rate  Georgiana Mera was then restarted at [phone]  At 1344, at 192mL/hr rate, pt started to become red again and Radha Leonard was stopped  Patient spoke with Dr Anthony Herrera and itching restarted  Per Dr Anthony Herrera, given Claritin 10mg, wait 15 min and restart at 192mL/hr rate  Patient restarted 1413  Per Dr Anthony Herrera, if patient continues with redness and itching, do not stop gazyva  Only stop if patient develops respiratory symptoms

## 2023-02-15 DIAGNOSIS — C82.38 GRADE 3A FOLLICULAR LYMPHOMA OF LYMPH NODES OF MULTIPLE REGIONS (HCC): ICD-10-CM

## 2023-02-15 RX ORDER — ALLOPURINOL 300 MG/1
300 TABLET ORAL DAILY
Qty: 30 TABLET | Refills: 2 | Status: SHIPPED | OUTPATIENT
Start: 2023-02-15

## 2023-02-16 ENCOUNTER — TELEPHONE (OUTPATIENT)
Dept: HEMATOLOGY ONCOLOGY | Facility: CLINIC | Age: 53
End: 2023-02-16

## 2023-02-16 NOTE — TELEPHONE ENCOUNTER
Patient called in to try and reschedule appointment on 2/21/23 with Dr Casper Anderson  I advised patient that his next available appointment was not until the end of March  Patient decided to keep his scheduled appointment

## 2023-02-20 RX ORDER — SODIUM CHLORIDE 9 MG/ML
20 INJECTION, SOLUTION INTRAVENOUS ONCE
Status: CANCELLED | OUTPATIENT
Start: 2023-02-27

## 2023-02-20 RX ORDER — ACETAMINOPHEN 325 MG/1
650 TABLET ORAL ONCE
Status: CANCELLED | OUTPATIENT
Start: 2023-02-27

## 2023-02-20 RX ORDER — SODIUM CHLORIDE 9 MG/ML
20 INJECTION, SOLUTION INTRAVENOUS ONCE
Status: CANCELLED | OUTPATIENT
Start: 2023-02-28

## 2023-02-21 ENCOUNTER — OFFICE VISIT (OUTPATIENT)
Dept: HEMATOLOGY ONCOLOGY | Facility: CLINIC | Age: 53
End: 2023-02-21

## 2023-02-21 VITALS
WEIGHT: 178 LBS | OXYGEN SATURATION: 99 % | SYSTOLIC BLOOD PRESSURE: 108 MMHG | TEMPERATURE: 97.5 F | HEIGHT: 73 IN | BODY MASS INDEX: 23.59 KG/M2 | HEART RATE: 71 BPM | DIASTOLIC BLOOD PRESSURE: 60 MMHG

## 2023-02-21 DIAGNOSIS — C82.38 GRADE 3A FOLLICULAR LYMPHOMA OF LYMPH NODES OF MULTIPLE REGIONS (HCC): Primary | ICD-10-CM

## 2023-02-21 NOTE — PROGRESS NOTES
Hematology Outpatient Follow - Up Note  Megan Nava 46 y o  male MRN: @ Encounter: [id number]        Date:  2/21/2023        Assessment/ Plan:    history of stage IV follicular lymphoma grade 3B, biopsy proven 5/2017 when he presented with weight loss, splenomegaly, constitutional symptoms, multiple lymph nodes above and below the diaphragm  Bone marrow biopsy 6/2017 showed 10% involvement with follicular lymphoma  He was treated with R-CHOP for total of 6 cycles spanning from May 2017 until September 2017  Subsequent PET scan showed no evidence of disease and no evidence of splenomegaly or lymphadenopathy     He received maintenance rituximab 375 milligram/meter squared every 2 months for total of 2 years finished in October 2019      Relapse of disease by PET scan in September 2020 which showed innumerable bilateral cervical, axillary, mediastinal, retroperitoneal, bilateral iliac, inguinal lymph nodes, splenic involvement      Excisional biopsy of a right inguinal lymph node 0/27/34 KBTHYN relapsed follicular lymphoma, no evidence of transformation, positive for CD 19, CD 20, CD 10, Ki-67 about 60%  Recurrence of disease by PET scan on 7/2022, constitutional symptoms in December 2022 requiring inguinal biopsy showed follicular lymphoma OD-77 about 10% positive for Bcl-2, BCL6, CD20, negative for CD5, CD10, cyclin D1     Evaluated at [address] multiple option were offered, including CAR-T, bispecifics, Bendamustine/anti-CD20     He is symptomatic with recurrent herpes zoster  He received Valtrex for 14 days, initiated on obinutuzumab/Bendamustine, the first 2 obinutuzumab infusion complicated with allergic reaction manifested by skin rash, pruritus, flushing    Very good clinical response, proceed with cycle #2    After cycle #3 we will do PET scan to assess response        Labs and imaging studies are reviewed by ordering provider once results are available   If there are findings that need immediate attention, you will be contacted when results available  Discussing results and the implication on your healthcare is best discussed in person at your follow-up visit  HPI:    Maryuri Malone a 46 y  o  male with follicular lymphoma   He presented in 2016 with splenomegaly, weight loss, constitutional symptoms      Multiple enlarged lymph nodes noted above and below the diaphragm  Excisional biopsy 5/2017 of 1 of the retroperitoneal lymph node confirmed the diagnosis     Bone marrow biopsy 6/2/2017 showed 10 percent involvement with follicular lymphoma      No evidence of transformation, 2nd opinion at [address] [place] [address] agreed on treatment with R-CHOP     He received 6 cycles of R-CHOP spanning from May 2017 until September 2017      Subsequent PET scan showed no evidence of disease and physical examination showed disappearance of splenomegaly and lymphadenopathy     Maintenance rituximab 375 milligram/meter squared every 2 months for total of 2 years finished in October 2019     PET scan in January 2020 showed uptake in the right inguinal area however patient did not have any constitutional symptoms  He had normal CBC, CMP, LDH we decided to watch and observe   At that time physical examination showed 1 5 cm left inguinal lymph node     Repeat PET scan in March 2020 showed uptake with SUV between 3 and 5 in the mediastinum, right perihilar area, no evidence of uptake in the abdomen or pelvis specially no uptake in the left inguinal area     In June 2020 cough intermittent without sputum production especially in the midthoracic area, denies any constitutional symptoms, CBC, CMP, LDH are normal     Repeat PET scan on 09/03/2020 showed progression of disease with innumerable new glucose avid lymph nodes throughout the neck, chest, retroperitoneal, iliac, inguinal regions, splenic uptake, no evidence of bulky disease with score of 5, mild increase in the spleen size        Status post excision biopsy of the right inguinal lymph node on 09/22/2020  A  Right inguinal lymph node:  - Follicular lymphoma, follicular pattern, grade 3A (average of 17 centroblasts/HPF) - see Note  - Flow cytometry ([facility]#[id number], evaluated by Dr Nicolette Snyder) reveals:   * CD10+ B-cell lymphoma, comprising 70% of total cells analyzed, of small to medium size with following immunophenotype:    -- positive: CD19, CD20, CD10, CD38, sKappa    -- negative: sLambda, CD5, CD11c, CD23   * Viability 7AAD: 89%    * The following antigens were evaluated & found to be expressed as described above or by appropriate cells:  CD2, CD3, CD4, CD5, CD7, CD8, CD10, CD11c, CD19, CD20, CD23, CD38, CD45, CD56, CD57, FMC7, sKappa, sLambda      There was no evidence of transformation, he was kept on watchful observation      COVID-19 infection in December 2020 with full recovery     On 06/2022 he felt abdominal pressure after drinking beer, by physical examination he was found to have splenomegaly, PET scan showed multiple lymphadenopathy in the cervical, supra and infraclavicular, axillary, abdominal, mediastinal, inguinal area the largest in the retroperitoneal area up to 7 cm, spleen is enlarged as well     Repeat biopsy of the inguinal area at [address] showed low-grade follicular lymphoma MA-47 of 10% positive for CD20, PAX5, Bcl-2, BCL6, CD10, negative for CD5, CD23, cyclin D1     EZH2 is pending  Interval History:        Previous Treatment:         Test Results:    Imaging: No results found      Labs:   Lab Results   Component Value Date    WBC 12 86 (H) 02/10/2023    HGB 13 4 02/10/2023    HCT 40 5 02/10/2023    MCV 86 02/10/2023     02/10/2023     Lab Results   Component Value Date     08/16/2017    K 3 6 02/10/2023     02/10/2023    CO2 28 02/10/2023    BUN 16 02/10/2023    CREATININE 0 93 02/10/2023    GLUF 86 02/02/2023    CALCIUM 8 9 02/10/2023    CORRECTEDCA 9 2 12/30/2020    AST 9 02/10/2023    ALT 13 02/10/2023    ALKPHOS 189 (H) 02/10/2023    PROT 6 3 08/16/2017    BILITOT 0 5 08/16/2017    EGFR 92 02/10/2023       Lab Results   Component Value Date    IRON 103 07/01/2019    FERRITIN 187 07/01/2019       No results found for: [id number]      ROS: Review of Systems   Constitutional: Negative  Negative for appetite change, chills, diaphoresis, fatigue, fever and unexpected weight change  HENT:   Negative for hearing loss, lump/mass, mouth sores, nosebleeds, sore throat, trouble swallowing and voice change  Eyes: Negative  Negative for eye problems and icterus  Respiratory: Negative  Negative for chest tightness, cough, hemoptysis and shortness of breath  Cardiovascular: Negative for chest pain and leg swelling  Gastrointestinal: Negative for abdominal distention, abdominal pain, blood in stool, constipation, diarrhea and nausea  Endocrine: Negative  Genitourinary: Negative for dysuria, frequency, hematuria and pelvic pain  Musculoskeletal: Negative  Negative for arthralgias, back pain, flank pain, gait problem, myalgias and neck stiffness  Skin: Negative for itching and rash  Neurological: Negative for dizziness, gait problem, headaches, light-headedness, numbness and speech difficulty  Hematological: Negative for adenopathy  Does not bruise/bleed easily  Psychiatric/Behavioral: Negative for confusion, decreased concentration, depression and sleep disturbance  The patient is not nervous/anxious  Current Medications: Reviewed  Allergies: Reviewed  PMH/FH/SH:  Reviewed      Physical Exam:    Body surface area is 2 05 meters squared      Wt Readings from Last 3 Encounters:   02/21/23 80 7 kg (178 lb)   02/08/23 87 5 kg (193 lb)   02/02/23 89 4 kg (196 lb 15 7 oz)        Temp Readings from Last 3 Encounters:   02/21/23 97 5 °F (36 4 °C) (Temporal)   02/14/23 97 6 °F (36 4 °C) (Temporal)   02/07/23 98 5 °F (36 9 °C) (Temporal)        BP Readings from Last 3 Encounters: 23 108/60   23 120/76   23 126/78         Pulse Readings from Last 3 Encounters:   23 71   23 85   23 71        Physical Exam  Vitals reviewed  Constitutional:       General: He is not in acute distress  Appearance: He is well-developed  He is not diaphoretic  HENT:      Head: Normocephalic and atraumatic  Eyes:      Conjunctiva/sclera: Conjunctivae normal    Neck:      Trachea: No tracheal deviation  Cardiovascular:      Rate and Rhythm: Normal rate and regular rhythm  Heart sounds: No murmur heard  No friction rub  No gallop  Pulmonary:      Effort: Pulmonary effort is normal  No respiratory distress  Breath sounds: Normal breath sounds  No wheezing or rales  Chest:      Chest wall: No tenderness  Abdominal:      General: There is no distension  Palpations: Abdomen is soft  Tenderness: There is no abdominal tenderness  Musculoskeletal:      Cervical back: Normal range of motion and neck supple  Right lower leg: No edema  Left lower leg: No edema  Lymphadenopathy:      Cervical: No cervical adenopathy  Skin:     General: Skin is warm and dry  Coloration: Skin is not pale  Findings: No erythema  Neurological:      Mental Status: He is alert and oriented to person, place, and time  Psychiatric:         Behavior: Behavior normal          Thought Content: Thought content normal          Judgment: Judgment normal          ECO    Goals and Barriers:  Current Goal: Minimize effects of disease  Barriers: None  Patient's Capacity to Self Care:  Patient is able to self care      Code Status: [unfilled]

## 2023-02-24 ENCOUNTER — HOSPITAL ENCOUNTER (OUTPATIENT)
Dept: INFUSION CENTER | Facility: HOSPITAL | Age: 53
End: 2023-02-24
Attending: INTERNAL MEDICINE

## 2023-02-24 VITALS
SYSTOLIC BLOOD PRESSURE: 112 MMHG | RESPIRATION RATE: 18 BRPM | WEIGHT: 176.37 LBS | TEMPERATURE: 97 F | HEART RATE: 69 BPM | DIASTOLIC BLOOD PRESSURE: 73 MMHG | BODY MASS INDEX: 23.27 KG/M2

## 2023-02-24 DIAGNOSIS — C82.38 GRADE 3A FOLLICULAR LYMPHOMA OF LYMPH NODES OF MULTIPLE REGIONS (HCC): ICD-10-CM

## 2023-02-24 DIAGNOSIS — D80.1 HYPOGAMMAGLOBULINEMIA (HCC): Primary | ICD-10-CM

## 2023-02-24 DIAGNOSIS — T45.1X5A CHEMOTHERAPY INDUCED NEUTROPENIA (HCC): ICD-10-CM

## 2023-02-24 DIAGNOSIS — D70.1 CHEMOTHERAPY INDUCED NEUTROPENIA (HCC): ICD-10-CM

## 2023-02-24 LAB
ALBUMIN SERPL BCP-MCNC: 3.9 G/DL (ref 3.5–5)
ALP SERPL-CCNC: 161 U/L (ref 43–122)
ALT SERPL W P-5'-P-CCNC: 14 U/L
ANION GAP SERPL CALCULATED.3IONS-SCNC: 6 MMOL/L (ref 5–14)
AST SERPL W P-5'-P-CCNC: 24 U/L (ref 17–59)
BASOPHILS # BLD AUTO: 0.11 THOUSANDS/ÂΜL (ref 0–0.1)
BASOPHILS NFR BLD AUTO: 2 % (ref 0–1)
BILIRUB SERPL-MCNC: 0.52 MG/DL (ref 0.2–1)
BUN SERPL-MCNC: 15 MG/DL (ref 5–25)
CALCIUM SERPL-MCNC: 8.7 MG/DL (ref 8.4–10.2)
CHLORIDE SERPL-SCNC: 105 MMOL/L (ref 96–108)
CO2 SERPL-SCNC: 26 MMOL/L (ref 21–32)
CREAT SERPL-MCNC: 0.66 MG/DL (ref 0.7–1.5)
EOSINOPHIL # BLD AUTO: 0.39 THOUSAND/ÂΜL (ref 0–0.61)
EOSINOPHIL NFR BLD AUTO: 6 % (ref 0–6)
ERYTHROCYTE [DISTWIDTH] IN BLOOD BY AUTOMATED COUNT: 15.9 % (ref 11.6–15.1)
GFR SERPL CREATININE-BSD FRML MDRD: 109 ML/MIN/1.73SQ M
GLUCOSE SERPL-MCNC: 107 MG/DL (ref 70–99)
HCT VFR BLD AUTO: 36.2 % (ref 36.5–49.3)
HGB BLD-MCNC: 11.7 G/DL (ref 12–17)
IMM GRANULOCYTES # BLD AUTO: 0.05 THOUSAND/UL (ref 0–0.2)
IMM GRANULOCYTES NFR BLD AUTO: 1 % (ref 0–2)
LYMPHOCYTES # BLD AUTO: 0.74 THOUSANDS/ÂΜL (ref 0.6–4.47)
LYMPHOCYTES NFR BLD AUTO: 12 % (ref 14–44)
MCH RBC QN AUTO: 28 PG (ref 26.8–34.3)
MCHC RBC AUTO-ENTMCNC: 32.3 G/DL (ref 31.4–37.4)
MCV RBC AUTO: 87 FL (ref 82–98)
MONOCYTES # BLD AUTO: 0.72 THOUSAND/ÂΜL (ref 0.17–1.22)
MONOCYTES NFR BLD AUTO: 12 % (ref 4–12)
NEUTROPHILS # BLD AUTO: 4.21 THOUSANDS/ÂΜL (ref 1.85–7.62)
NEUTS SEG NFR BLD AUTO: 67 % (ref 43–75)
NRBC BLD AUTO-RTO: 0 /100 WBCS
PLATELET # BLD AUTO: 175 THOUSANDS/UL (ref 149–390)
PMV BLD AUTO: 9 FL (ref 8.9–12.7)
POTASSIUM SERPL-SCNC: 4.2 MMOL/L (ref 3.5–5.3)
PROT SERPL-MCNC: 6.5 G/DL (ref 6.4–8.4)
RBC # BLD AUTO: 4.18 MILLION/UL (ref 3.88–5.62)
SODIUM SERPL-SCNC: 137 MMOL/L (ref 135–147)
WBC # BLD AUTO: 6.22 THOUSAND/UL (ref 4.31–10.16)

## 2023-02-24 RX ORDER — ACETAMINOPHEN 325 MG/1
650 TABLET ORAL ONCE
OUTPATIENT
Start: 2023-03-24

## 2023-02-24 RX ORDER — SODIUM CHLORIDE 9 MG/ML
20 INJECTION, SOLUTION INTRAVENOUS ONCE
OUTPATIENT
Start: 2023-03-24

## 2023-02-24 RX ORDER — DIPHENHYDRAMINE HCL 25 MG
50 TABLET ORAL ONCE
Status: COMPLETED | OUTPATIENT
Start: 2023-02-24 | End: 2023-02-24

## 2023-02-24 RX ORDER — SODIUM CHLORIDE 9 MG/ML
20 INJECTION, SOLUTION INTRAVENOUS ONCE
Status: COMPLETED | OUTPATIENT
Start: 2023-02-24 | End: 2023-02-24

## 2023-02-24 RX ORDER — DIPHENHYDRAMINE HCL 25 MG
50 TABLET ORAL ONCE
OUTPATIENT
Start: 2023-03-24

## 2023-02-24 RX ORDER — ACETAMINOPHEN 325 MG/1
650 TABLET ORAL ONCE
Status: COMPLETED | OUTPATIENT
Start: 2023-02-24 | End: 2023-02-24

## 2023-02-24 RX ADMIN — ACETAMINOPHEN 650 MG: 325 TABLET ORAL at 08:58

## 2023-02-24 RX ADMIN — Medication 30 G: at 09:33

## 2023-02-24 RX ADMIN — SODIUM CHLORIDE 20 ML/HR: 0.9 INJECTION, SOLUTION INTRAVENOUS at 08:52

## 2023-02-24 RX ADMIN — DIPHENHYDRAMINE HCL 50 MG: 25 TABLET ORAL at 08:58

## 2023-02-24 NOTE — PROGRESS NOTES
Pt comes for IGG today and central labs for chemo Monday  Patient has had 17 pound weight loss in past 2 weeks  Pt states he has had an appetite and eating well and declines a nutrition consult  States he also had fevers again couple days after last Gazyva  Notified Sissy Berger, RN  Patient tolerated IGG well without complications  Port flushed per protocol and central labs drawn per orders

## 2023-02-27 ENCOUNTER — DOCUMENTATION (OUTPATIENT)
Dept: HEMATOLOGY ONCOLOGY | Facility: CLINIC | Age: 53
End: 2023-02-27

## 2023-02-27 ENCOUNTER — HOSPITAL ENCOUNTER (OUTPATIENT)
Dept: INFUSION CENTER | Facility: HOSPITAL | Age: 53
Discharge: HOME | End: 2023-02-27
Attending: INTERNAL MEDICINE

## 2023-02-27 VITALS
DIASTOLIC BLOOD PRESSURE: 64 MMHG | TEMPERATURE: 97.1 F | SYSTOLIC BLOOD PRESSURE: 113 MMHG | BODY MASS INDEX: 23.32 KG/M2 | WEIGHT: 175.93 LBS | HEART RATE: 69 BPM | HEIGHT: 73 IN | RESPIRATION RATE: 16 BRPM

## 2023-02-27 DIAGNOSIS — C82.38 GRADE 3A FOLLICULAR LYMPHOMA OF LYMPH NODES OF MULTIPLE REGIONS (HCC): ICD-10-CM

## 2023-02-27 DIAGNOSIS — D80.1 HYPOGAMMAGLOBULINEMIA (HCC): Primary | ICD-10-CM

## 2023-02-27 DIAGNOSIS — D70.1 CHEMOTHERAPY INDUCED NEUTROPENIA (HCC): ICD-10-CM

## 2023-02-27 DIAGNOSIS — T45.1X5A CHEMOTHERAPY INDUCED NEUTROPENIA (HCC): ICD-10-CM

## 2023-02-27 RX ORDER — SODIUM CHLORIDE 9 MG/ML
20 INJECTION, SOLUTION INTRAVENOUS ONCE
Status: COMPLETED | OUTPATIENT
Start: 2023-02-27 | End: 2023-02-27

## 2023-02-27 RX ORDER — ACETAMINOPHEN 325 MG/1
650 TABLET ORAL ONCE
Status: COMPLETED | OUTPATIENT
Start: 2023-02-27 | End: 2023-02-27

## 2023-02-27 RX ADMIN — BENDAMUSTINE HYDROCHLORIDE 195.3 MG: 25 INJECTION, SOLUTION INTRAVENOUS at 15:11

## 2023-02-27 RX ADMIN — DEXAMETHASONE SODIUM PHOSPHATE 20 MG: 10 INJECTION, SOLUTION INTRAMUSCULAR; INTRAVENOUS at 08:52

## 2023-02-27 RX ADMIN — OBINUTUZUMAB 1000 MG: 1000 INJECTION, SOLUTION, CONCENTRATE INTRAVENOUS at 10:32

## 2023-02-27 RX ADMIN — DIPHENHYDRAMINE HYDROCHLORIDE 25 MG: 50 INJECTION, SOLUTION INTRAMUSCULAR; INTRAVENOUS at 09:14

## 2023-02-27 RX ADMIN — ACETAMINOPHEN 650 MG: 325 TABLET ORAL at 08:55

## 2023-02-27 RX ADMIN — SODIUM CHLORIDE 20 ML/HR: 9 INJECTION, SOLUTION INTRAVENOUS at 08:50

## 2023-02-27 NOTE — PROGRESS NOTES
Pt tolerated treatment today with no adverse reactions  Confirmed apt for day 2 tomorrow  Left unit ambulatory with a steady gait

## 2023-02-28 ENCOUNTER — HOSPITAL ENCOUNTER (OUTPATIENT)
Dept: INFUSION CENTER | Facility: HOSPITAL | Age: 53
Discharge: HOME | End: 2023-02-28
Attending: INTERNAL MEDICINE

## 2023-02-28 VITALS
DIASTOLIC BLOOD PRESSURE: 74 MMHG | HEIGHT: 73 IN | SYSTOLIC BLOOD PRESSURE: 121 MMHG | BODY MASS INDEX: 23.32 KG/M2 | HEART RATE: 66 BPM | OXYGEN SATURATION: 99 % | WEIGHT: 175.93 LBS | TEMPERATURE: 98.4 F | RESPIRATION RATE: 18 BRPM

## 2023-02-28 DIAGNOSIS — C82.38 GRADE 3A FOLLICULAR LYMPHOMA OF LYMPH NODES OF MULTIPLE REGIONS (HCC): ICD-10-CM

## 2023-02-28 DIAGNOSIS — T45.1X5A CHEMOTHERAPY INDUCED NEUTROPENIA (HCC): ICD-10-CM

## 2023-02-28 DIAGNOSIS — D80.1 HYPOGAMMAGLOBULINEMIA (HCC): Primary | ICD-10-CM

## 2023-02-28 DIAGNOSIS — D70.1 CHEMOTHERAPY INDUCED NEUTROPENIA (HCC): ICD-10-CM

## 2023-02-28 RX ORDER — SODIUM CHLORIDE 9 MG/ML
20 INJECTION, SOLUTION INTRAVENOUS ONCE
Status: COMPLETED | OUTPATIENT
Start: 2023-02-28 | End: 2023-02-28

## 2023-02-28 RX ADMIN — PEGFILGRASTIM 6 MG: KIT SUBCUTANEOUS at 09:18

## 2023-02-28 RX ADMIN — DEXAMETHASONE SODIUM PHOSPHATE: 10 INJECTION, SOLUTION INTRAMUSCULAR; INTRAVENOUS at 08:33

## 2023-02-28 RX ADMIN — BENDAMUSTINE HYDROCHLORIDE 195.3 MG: 25 INJECTION, SOLUTION INTRAVENOUS at 09:10

## 2023-02-28 RX ADMIN — SODIUM CHLORIDE 20 ML/HR: 9 INJECTION, SOLUTION INTRAVENOUS at 08:33

## 2023-02-28 NOTE — PROGRESS NOTES
Patient tolerated day 2 bendeka without issues  Neulasta onpro placed on  R arm, confirmed full and green light flashing  Next appointment confirmed, AVS declined

## 2023-03-20 DIAGNOSIS — C82.38 GRADE 3A FOLLICULAR LYMPHOMA OF LYMPH NODES OF MULTIPLE REGIONS (HCC): Primary | ICD-10-CM

## 2023-03-20 RX ORDER — ACETAMINOPHEN 325 MG/1
650 TABLET ORAL ONCE
Status: CANCELLED | OUTPATIENT
Start: 2023-03-27

## 2023-03-20 RX ORDER — SODIUM CHLORIDE 9 MG/ML
20 INJECTION, SOLUTION INTRAVENOUS ONCE
Status: CANCELLED | OUTPATIENT
Start: 2023-03-27

## 2023-03-20 RX ORDER — SODIUM CHLORIDE 9 MG/ML
20 INJECTION, SOLUTION INTRAVENOUS ONCE
Status: CANCELLED | OUTPATIENT
Start: 2023-03-28

## 2023-03-23 PROBLEM — Z95.828 PORT-A-CATH IN PLACE: Status: ACTIVE | Noted: 2023-03-23

## 2023-03-24 ENCOUNTER — HOSPITAL ENCOUNTER (OUTPATIENT)
Dept: INFUSION CENTER | Facility: HOSPITAL | Age: 53
End: 2023-03-24
Attending: INTERNAL MEDICINE

## 2023-03-24 VITALS
TEMPERATURE: 97.3 F | WEIGHT: 180.34 LBS | SYSTOLIC BLOOD PRESSURE: 105 MMHG | RESPIRATION RATE: 18 BRPM | HEART RATE: 69 BPM | BODY MASS INDEX: 23.8 KG/M2 | DIASTOLIC BLOOD PRESSURE: 73 MMHG

## 2023-03-24 DIAGNOSIS — D80.1 HYPOGAMMAGLOBULINEMIA (HCC): Primary | ICD-10-CM

## 2023-03-24 DIAGNOSIS — T45.1X5A CHEMOTHERAPY INDUCED NEUTROPENIA (HCC): ICD-10-CM

## 2023-03-24 DIAGNOSIS — C82.38 GRADE 3A FOLLICULAR LYMPHOMA OF LYMPH NODES OF MULTIPLE REGIONS (HCC): ICD-10-CM

## 2023-03-24 DIAGNOSIS — D70.1 CHEMOTHERAPY INDUCED NEUTROPENIA (HCC): ICD-10-CM

## 2023-03-24 LAB
ALBUMIN SERPL BCP-MCNC: 3.9 G/DL (ref 3.5–5)
ALP SERPL-CCNC: 131 U/L (ref 34–104)
ALT SERPL W P-5'-P-CCNC: 24 U/L (ref 7–52)
ANION GAP SERPL CALCULATED.3IONS-SCNC: 7 MMOL/L (ref 4–13)
AST SERPL W P-5'-P-CCNC: 25 U/L (ref 13–39)
BASOPHILS # BLD AUTO: 0.11 THOUSANDS/ÂΜL (ref 0–0.1)
BASOPHILS NFR BLD AUTO: 2 % (ref 0–1)
BILIRUB SERPL-MCNC: 0.63 MG/DL (ref 0.2–1)
BUN SERPL-MCNC: 14 MG/DL (ref 5–25)
CALCIUM SERPL-MCNC: 8.6 MG/DL (ref 8.4–10.2)
CHLORIDE SERPL-SCNC: 108 MMOL/L (ref 96–108)
CO2 SERPL-SCNC: 28 MMOL/L (ref 21–32)
CREAT SERPL-MCNC: 0.72 MG/DL (ref 0.6–1.3)
EOSINOPHIL # BLD AUTO: 0.32 THOUSAND/ÂΜL (ref 0–0.61)
EOSINOPHIL NFR BLD AUTO: 4 % (ref 0–6)
ERYTHROCYTE [DISTWIDTH] IN BLOOD BY AUTOMATED COUNT: 16.2 % (ref 11.6–15.1)
GFR SERPL CREATININE-BSD FRML MDRD: 105 ML/MIN/1.73SQ M
GLUCOSE SERPL-MCNC: 85 MG/DL (ref 65–140)
HCT VFR BLD AUTO: 37.5 % (ref 36.5–49.3)
HGB BLD-MCNC: 12.1 G/DL (ref 12–17)
IMM GRANULOCYTES # BLD AUTO: 0.02 THOUSAND/UL (ref 0–0.2)
IMM GRANULOCYTES NFR BLD AUTO: 0 % (ref 0–2)
LYMPHOCYTES # BLD AUTO: 3.98 THOUSANDS/ÂΜL (ref 0.6–4.47)
LYMPHOCYTES NFR BLD AUTO: 55 % (ref 14–44)
MCH RBC QN AUTO: 28.4 PG (ref 26.8–34.3)
MCHC RBC AUTO-ENTMCNC: 32.3 G/DL (ref 31.4–37.4)
MCV RBC AUTO: 88 FL (ref 82–98)
MONOCYTES # BLD AUTO: 0.61 THOUSAND/ÂΜL (ref 0.17–1.22)
MONOCYTES NFR BLD AUTO: 8 % (ref 4–12)
NEUTROPHILS # BLD AUTO: 2.31 THOUSANDS/ÂΜL (ref 1.85–7.62)
NEUTS SEG NFR BLD AUTO: 31 % (ref 43–75)
NRBC BLD AUTO-RTO: 0 /100 WBCS
PLATELET # BLD AUTO: 171 THOUSANDS/UL (ref 149–390)
PMV BLD AUTO: 10 FL (ref 8.9–12.7)
POTASSIUM SERPL-SCNC: 4.2 MMOL/L (ref 3.5–5.3)
PROT SERPL-MCNC: 6.1 G/DL (ref 6.4–8.4)
RBC # BLD AUTO: 4.26 MILLION/UL (ref 3.88–5.62)
SODIUM SERPL-SCNC: 143 MMOL/L (ref 135–147)
WBC # BLD AUTO: 7.35 THOUSAND/UL (ref 4.31–10.16)

## 2023-03-24 RX ORDER — ACETAMINOPHEN 325 MG/1
650 TABLET ORAL ONCE
OUTPATIENT
Start: 2023-04-21

## 2023-03-24 RX ORDER — DIPHENHYDRAMINE HCL 25 MG
50 TABLET ORAL ONCE
OUTPATIENT
Start: 2023-04-21

## 2023-03-24 RX ORDER — SODIUM CHLORIDE 9 MG/ML
20 INJECTION, SOLUTION INTRAVENOUS ONCE
OUTPATIENT
Start: 2023-04-21

## 2023-03-24 RX ORDER — DIPHENHYDRAMINE HCL 25 MG
50 TABLET ORAL ONCE
Status: COMPLETED | OUTPATIENT
Start: 2023-03-24 | End: 2023-03-24

## 2023-03-24 RX ORDER — ACETAMINOPHEN 325 MG/1
650 TABLET ORAL ONCE
Status: COMPLETED | OUTPATIENT
Start: 2023-03-24 | End: 2023-03-24

## 2023-03-24 RX ORDER — SODIUM CHLORIDE 9 MG/ML
20 INJECTION, SOLUTION INTRAVENOUS ONCE
Status: COMPLETED | OUTPATIENT
Start: 2023-03-24 | End: 2023-03-24

## 2023-03-24 RX ADMIN — DIPHENHYDRAMINE HYDROCHLORIDE 50 MG: 25 TABLET ORAL at 08:23

## 2023-03-24 RX ADMIN — SODIUM CHLORIDE 20 ML/HR: 0.9 INJECTION, SOLUTION INTRAVENOUS at 08:24

## 2023-03-24 RX ADMIN — ACETAMINOPHEN 650 MG: 325 TABLET ORAL at 08:23

## 2023-03-24 RX ADMIN — Medication 30 G: at 09:02

## 2023-03-24 NOTE — PROGRESS NOTES
IVIG tolerated well without complications  No complaints offered  AVS declined  Left unit in stable condition

## 2023-03-25 DIAGNOSIS — C82.38 GRADE 3A FOLLICULAR LYMPHOMA OF LYMPH NODES OF MULTIPLE REGIONS (HCC): ICD-10-CM

## 2023-03-27 ENCOUNTER — HOSPITAL ENCOUNTER (OUTPATIENT)
Dept: INFUSION CENTER | Facility: HOSPITAL | Age: 53
Discharge: HOME | End: 2023-03-27
Attending: INTERNAL MEDICINE

## 2023-03-27 VITALS
SYSTOLIC BLOOD PRESSURE: 110 MMHG | WEIGHT: 182.1 LBS | HEART RATE: 69 BPM | RESPIRATION RATE: 18 BRPM | OXYGEN SATURATION: 98 % | HEIGHT: 73 IN | TEMPERATURE: 97.9 F | BODY MASS INDEX: 24.13 KG/M2 | DIASTOLIC BLOOD PRESSURE: 72 MMHG

## 2023-03-27 DIAGNOSIS — C82.38 GRADE 3A FOLLICULAR LYMPHOMA OF LYMPH NODES OF MULTIPLE REGIONS (HCC): ICD-10-CM

## 2023-03-27 DIAGNOSIS — D80.1 HYPOGAMMAGLOBULINEMIA (HCC): Primary | ICD-10-CM

## 2023-03-27 DIAGNOSIS — T45.1X5A CHEMOTHERAPY INDUCED NEUTROPENIA (HCC): ICD-10-CM

## 2023-03-27 DIAGNOSIS — D70.1 CHEMOTHERAPY INDUCED NEUTROPENIA (HCC): ICD-10-CM

## 2023-03-27 RX ORDER — ALLOPURINOL 300 MG/1
TABLET ORAL
Qty: 90 TABLET | Refills: 3 | Status: SHIPPED | OUTPATIENT
Start: 2023-03-27 | End: 2023-04-06

## 2023-03-27 RX ORDER — SODIUM CHLORIDE 9 MG/ML
20 INJECTION, SOLUTION INTRAVENOUS ONCE
Status: COMPLETED | OUTPATIENT
Start: 2023-03-27 | End: 2023-03-27

## 2023-03-27 RX ORDER — ACETAMINOPHEN 325 MG/1
650 TABLET ORAL ONCE
Status: COMPLETED | OUTPATIENT
Start: 2023-03-27 | End: 2023-03-27

## 2023-03-27 RX ADMIN — ACETAMINOPHEN 650 MG: 325 TABLET ORAL at 08:31

## 2023-03-27 RX ADMIN — BENDAMUSTINE HYDROCHLORIDE 195.3 MG: 25 INJECTION, SOLUTION INTRAVENOUS at 14:02

## 2023-03-27 RX ADMIN — SODIUM CHLORIDE 20 ML/HR: 9 INJECTION, SOLUTION INTRAVENOUS at 08:27

## 2023-03-27 RX ADMIN — OBINUTUZUMAB 1000 MG: 1000 INJECTION, SOLUTION, CONCENTRATE INTRAVENOUS at 10:30

## 2023-03-27 RX ADMIN — DIPHENHYDRAMINE HYDROCHLORIDE 25 MG: 50 INJECTION, SOLUTION INTRAMUSCULAR; INTRAVENOUS at 08:27

## 2023-03-27 RX ADMIN — DEXAMETHASONE SODIUM PHOSPHATE 20 MG: 10 INJECTION, SOLUTION INTRAMUSCULAR; INTRAVENOUS at 08:49

## 2023-03-27 NOTE — PROGRESS NOTES
Patient tolerated IV chemotherapy without issue  Appointment confirmed for tomorrow 8am, AVS declined

## 2023-03-28 ENCOUNTER — HOSPITAL ENCOUNTER (OUTPATIENT)
Dept: INFUSION CENTER | Facility: HOSPITAL | Age: 53
Discharge: HOME | End: 2023-03-28
Attending: INTERNAL MEDICINE

## 2023-03-28 VITALS
HEIGHT: 73 IN | HEART RATE: 62 BPM | BODY MASS INDEX: 24.13 KG/M2 | WEIGHT: 182.1 LBS | DIASTOLIC BLOOD PRESSURE: 68 MMHG | RESPIRATION RATE: 18 BRPM | SYSTOLIC BLOOD PRESSURE: 112 MMHG | TEMPERATURE: 98.5 F | OXYGEN SATURATION: 99 %

## 2023-03-28 DIAGNOSIS — T45.1X5A CHEMOTHERAPY INDUCED NEUTROPENIA (HCC): ICD-10-CM

## 2023-03-28 DIAGNOSIS — C82.38 GRADE 3A FOLLICULAR LYMPHOMA OF LYMPH NODES OF MULTIPLE REGIONS (HCC): ICD-10-CM

## 2023-03-28 DIAGNOSIS — D70.1 CHEMOTHERAPY INDUCED NEUTROPENIA (HCC): ICD-10-CM

## 2023-03-28 DIAGNOSIS — D80.1 HYPOGAMMAGLOBULINEMIA (HCC): Primary | ICD-10-CM

## 2023-03-28 RX ORDER — SODIUM CHLORIDE 9 MG/ML
20 INJECTION, SOLUTION INTRAVENOUS ONCE
Status: COMPLETED | OUTPATIENT
Start: 2023-03-28 | End: 2023-03-28

## 2023-03-28 RX ADMIN — DEXAMETHASONE SODIUM PHOSPHATE: 10 INJECTION, SOLUTION INTRAMUSCULAR; INTRAVENOUS at 08:29

## 2023-03-28 RX ADMIN — SODIUM CHLORIDE 20 ML/HR: 9 INJECTION, SOLUTION INTRAVENOUS at 08:29

## 2023-03-28 RX ADMIN — BENDAMUSTINE HYDROCHLORIDE 195.3 MG: 25 INJECTION, SOLUTION INTRAVENOUS at 09:06

## 2023-03-28 RX ADMIN — PEGFILGRASTIM 6 MG: KIT SUBCUTANEOUS at 09:07

## 2023-03-28 NOTE — PROGRESS NOTES
Patient tolerated IV chemotherapy without issue  Neulasta Onpro placed on R arm, noted to be full and green light flashing  Next appointment confirmed, AVS declined

## 2023-04-06 ENCOUNTER — OFFICE VISIT (OUTPATIENT)
Dept: HEMATOLOGY ONCOLOGY | Facility: CLINIC | Age: 53
End: 2023-04-06

## 2023-04-06 DIAGNOSIS — C82.38 GRADE 3A FOLLICULAR LYMPHOMA OF LYMPH NODES OF MULTIPLE REGIONS (HCC): Primary | ICD-10-CM

## 2023-04-06 PROBLEM — U07.1 COVID-19 VIRUS RNA DETECTED: Status: RESOLVED | Noted: 2020-12-14 | Resolved: 2023-04-06

## 2023-04-06 NOTE — PROGRESS NOTES
Hematology Outpatient Follow - Up Note  Wayne Hogan 46 y o  male MRN: @ Encounter: [id number]        Date:  4/6/2023        Assessment/ Plan:    history of stage IV follicular lymphoma grade 3B, biopsy proven 5/2017 when he presented with weight loss, splenomegaly, constitutional symptoms, multiple lymph nodes above and below the diaphragm  Bone marrow biopsy 6/2017 showed 10% involvement with follicular lymphoma  He was treated with R-CHOP for total of 6 cycles spanning from May 2017 until September 2017  Subsequent PET scan showed no evidence of disease and no evidence of splenomegaly or lymphadenopathy     He received maintenance rituximab 375 milligram/meter squared every 2 months for total of 2 years finished in October 2019      Relapse of disease by PET scan in September 2020 which showed innumerable bilateral cervical, axillary, mediastinal, retroperitoneal, bilateral iliac, inguinal lymph nodes, splenic involvement      Excisional biopsy of a right inguinal lymph node 0/62/04 [facility] relapsed follicular lymphoma, no evidence of transformation, positive for CD 19, CD 20, CD 10, Ki-67 about 60%  Recurrence of disease by PET scan on 7/2022, constitutional symptoms in December 2022 requiring inguinal biopsy showed follicular lymphoma PC-16 about 10% positive for Bcl-2, BCL6, CD20, negative for CD5, CD10, cyclin D1     Evaluated at [address] multiple option were offered, including CAR-T, bispecifics, Bendamustine/anti-CD20    Initiated on obinutuzumab/Bendamustine he received 3 cycles, will do PET scan    Postherpetic neuralgia of the left flank area, continue Valtrex 500 mg p o  daily for prophylactic    No evidence of hypogammaglobulinemia    Facial rash we will discontinue allopurinol it might be related to Bendamustine? Labs and imaging studies are reviewed by ordering provider once results are available   If there are findings that need immediate attention, you will be contacted when results available  Discussing results and the implication on your healthcare is best discussed in person at your follow-up visit  HPI:    Christel Mckinnon a 77 B  o  male with follicular lymphoma   He presented in 2016 with splenomegaly, weight loss, constitutional symptoms      Multiple enlarged lymph nodes noted above and below the diaphragm  Excisional biopsy 5/2017 of 1 of the retroperitoneal lymph node confirmed the diagnosis     Bone marrow biopsy 6/2/2017 showed 10 percent involvement with follicular lymphoma      No evidence of transformation, 2nd opinion at [address] agreed on treatment with R-CHOP     He received 6 cycles of R-CHOP spanning from May 2017 until September 2017      Subsequent PET scan showed no evidence of disease and physical examination showed disappearance of splenomegaly and lymphadenopathy     Maintenance rituximab 375 milligram/meter squared every 2 months for total of 2 years finished in October 2019     PET scan in January 2020 showed uptake in the right inguinal area however patient did not have any constitutional symptoms  He had normal CBC, CMP, LDH we decided to watch and observe   At that time physical examination showed 1 5 cm left inguinal lymph node     Repeat PET scan in March 2020 showed uptake with SUV between 3 and 5 in the mediastinum, right perihilar area, no evidence of uptake in the abdomen or pelvis specially no uptake in the left inguinal area     In June 2020 cough intermittent without sputum production especially in the midthoracic area, denies any constitutional symptoms, CBC, CMP, LDH are normal     Repeat PET scan on 09/03/2020 showed progression of disease with innumerable new glucose avid lymph nodes throughout the neck, chest, retroperitoneal, iliac, inguinal regions, splenic uptake, no evidence of bulky disease with score of 5, mild increase in the spleen size        Status post excision biopsy of the right inguinal lymph node on 09/22/2020  A  Right inguinal lymph node:  - Follicular lymphoma, follicular pattern, grade 3A (average of 17 centroblasts/HPF) - see Note  - Flow cytometry ([facility]#[id number], evaluated by Dr Alisa Thompson) reveals:   * CD10+ B-cell lymphoma, comprising 70% of total cells analyzed, of small to medium size with following immunophenotype:    -- positive: CD19, CD20, CD10, CD38, sKappa    -- negative: sLambda, CD5, CD11c, CD23   * Viability 7AAD: 89%    * The following antigens were evaluated & found to be expressed as described above or by appropriate cells:  CD2, CD3, CD4, CD5, CD7, CD8, CD10, CD11c, CD19, CD20, CD23, CD38, CD45, CD56, CD57, FMC7, sKappa, sLambda      There was no evidence of transformation, he was kept on watchful observation      COVID-19 infection in December 2020 with full recovery     On 06/2022 he felt abdominal pressure after drinking beer, by physical examination he was found to have splenomegaly, PET scan showed multiple lymphadenopathy in the cervical, supra and infraclavicular, axillary, abdominal, mediastinal, inguinal area the largest in the retroperitoneal area up to 7 cm, spleen is enlarged as well     Repeat biopsy of the inguinal area at [address] showed low-grade follicular lymphoma WT-64 of 10% positive for CD20, PAX5, Bcl-2, BCL6, CD10, negative for CD5, CD23, cyclin D1     EZH2 is pending  Interval History:        Previous Treatment:         Test Results:    Imaging: No results found      Labs:   Lab Results   Component Value Date    WBC 7 35 03/24/2023    HGB 12 1 03/24/2023    HCT 37 5 03/24/2023    MCV 88 03/24/2023     03/24/2023     Lab Results   Component Value Date     08/16/2017    K 4 2 03/24/2023     03/24/2023    CO2 28 03/24/2023    BUN 14 03/24/2023    CREATININE 0 72 03/24/2023    GLUF 86 02/02/2023    CALCIUM 8 6 03/24/2023    CORRECTEDCA 9 2 12/30/2020    AST 25 03/24/2023    ALT 24 03/24/2023    ALKPHOS 131 (H) 03/24/2023 PROT 6 3 08/16/2017    BILITOT 0 5 08/16/2017    EGFR 105 03/24/2023       Lab Results   Component Value Date    IRON 103 07/01/2019    FERRITIN 187 07/01/2019       No results found for: APQPPDTM89      ROS: Review of Systems   Constitutional: Negative  Negative for appetite change, chills, diaphoresis, fatigue, fever and unexpected weight change  HENT:   Negative for hearing loss, lump/mass, mouth sores, nosebleeds, sore throat, trouble swallowing and voice change  Eyes: Negative  Negative for eye problems and icterus  Respiratory: Negative  Negative for chest tightness, cough, hemoptysis and shortness of breath  Cardiovascular: Negative for chest pain and leg swelling  Gastrointestinal: Negative for abdominal distention, abdominal pain, blood in stool, constipation, diarrhea and nausea  Endocrine: Negative  Genitourinary: Negative for dysuria, frequency, hematuria and pelvic pain  Musculoskeletal: Negative  Negative for arthralgias, back pain, flank pain, gait problem, myalgias and neck stiffness  Skin: Negative for itching (Of the facial rash) and rash  Neurological: Negative for dizziness, gait problem, headaches, light-headedness, numbness and speech difficulty  Hematological: Negative for adenopathy  Does not bruise/bleed easily  Psychiatric/Behavioral: Negative for confusion, decreased concentration, depression and sleep disturbance  The patient is not nervous/anxious  Current Medications: Reviewed  Allergies: Reviewed  PMH/FH/SH:  Reviewed      Physical Exam:    There is no height or weight on file to calculate BSA      Wt Readings from Last 3 Encounters:   03/28/23 82 6 kg (182 lb 1 6 oz)   03/27/23 82 6 kg (182 lb 1 6 oz)   03/24/23 81 8 kg (180 lb 5 4 oz)        Temp Readings from Last 3 Encounters:   03/28/23 98 5 °F (36 9 °C) (Temporal)   03/27/23 97 9 °F (36 6 °C) (Temporal)   03/24/23 (!) 97 3 °F (36 3 °C) (Temporal)        BP Readings from Last 3 Encounters: 23 112/68   23 110/72   23 105/73         Pulse Readings from Last 3 Encounters:   23 62   23 69   23 69        Physical Exam  Vitals reviewed  Constitutional:       General: He is not in acute distress  Appearance: He is well-developed  He is not diaphoretic  HENT:      Head: Normocephalic and atraumatic  Eyes:      Conjunctiva/sclera: Conjunctivae normal    Neck:      Trachea: No tracheal deviation  Cardiovascular:      Rate and Rhythm: Normal rate and regular rhythm  Heart sounds: No murmur heard  No friction rub  No gallop  Pulmonary:      Effort: Pulmonary effort is normal  No respiratory distress  Breath sounds: Normal breath sounds  No wheezing or rales  Chest:      Chest wall: No tenderness  Abdominal:      General: There is no distension  Palpations: Abdomen is soft  There is splenomegaly (4 cm)  Tenderness: There is no abdominal tenderness  Musculoskeletal:      Cervical back: Normal range of motion and neck supple  Lymphadenopathy:      Cervical: No cervical adenopathy  Skin:     General: Skin is warm and dry  Coloration: Skin is not pale  Findings: Rash (Macular rash on the face sparing the posterior neck, rash of the postherpetic on the left flank area) present  No erythema  Neurological:      Mental Status: He is alert and oriented to person, place, and time  Psychiatric:         Behavior: Behavior normal          Thought Content: Thought content normal          Judgment: Judgment normal          ECO    Goals and Barriers:  Current Goal: Minimize effects of disease  Barriers: None  Patient's Capacity to Self Care:  Patient is able to self care      Code Status: [unfilled]

## 2023-04-17 DIAGNOSIS — C82.38 GRADE 3A FOLLICULAR LYMPHOMA OF LYMPH NODES OF MULTIPLE REGIONS (HCC): Primary | ICD-10-CM

## 2023-04-17 RX ORDER — SODIUM CHLORIDE 9 MG/ML
20 INJECTION, SOLUTION INTRAVENOUS ONCE
Status: CANCELLED | OUTPATIENT
Start: 2023-04-25

## 2023-04-17 RX ORDER — ACETAMINOPHEN 325 MG/1
650 TABLET ORAL ONCE
Status: CANCELLED | OUTPATIENT
Start: 2023-04-24

## 2023-04-17 RX ORDER — SODIUM CHLORIDE 9 MG/ML
20 INJECTION, SOLUTION INTRAVENOUS ONCE
Status: CANCELLED | OUTPATIENT
Start: 2023-04-24

## 2023-04-21 ENCOUNTER — HOSPITAL ENCOUNTER (OUTPATIENT)
Dept: INFUSION CENTER | Facility: HOSPITAL | Age: 53
Discharge: HOME | End: 2023-04-21
Attending: INTERNAL MEDICINE

## 2023-04-21 VITALS
SYSTOLIC BLOOD PRESSURE: 105 MMHG | DIASTOLIC BLOOD PRESSURE: 71 MMHG | OXYGEN SATURATION: 99 % | RESPIRATION RATE: 18 BRPM | TEMPERATURE: 98.6 F | HEART RATE: 71 BPM

## 2023-04-21 DIAGNOSIS — C82.38 GRADE 3A FOLLICULAR LYMPHOMA OF LYMPH NODES OF MULTIPLE REGIONS (HCC): ICD-10-CM

## 2023-04-21 DIAGNOSIS — D80.1 HYPOGAMMAGLOBULINEMIA (HCC): Primary | ICD-10-CM

## 2023-04-21 LAB
ALBUMIN SERPL BCP-MCNC: 4 G/DL (ref 3.5–5)
ALP SERPL-CCNC: 87 U/L (ref 34–104)
ALT SERPL W P-5'-P-CCNC: 16 U/L (ref 7–52)
ANION GAP SERPL CALCULATED.3IONS-SCNC: 5 MMOL/L (ref 4–13)
AST SERPL W P-5'-P-CCNC: 18 U/L (ref 13–39)
BASOPHILS # BLD AUTO: 0.07 THOUSANDS/ΜL (ref 0–0.1)
BASOPHILS NFR BLD AUTO: 1 % (ref 0–1)
BILIRUB SERPL-MCNC: 0.91 MG/DL (ref 0.2–1)
BUN SERPL-MCNC: 13 MG/DL (ref 5–25)
CALCIUM SERPL-MCNC: 8.9 MG/DL (ref 8.4–10.2)
CHLORIDE SERPL-SCNC: 108 MMOL/L (ref 96–108)
CO2 SERPL-SCNC: 28 MMOL/L (ref 21–32)
CREAT SERPL-MCNC: 0.73 MG/DL (ref 0.6–1.3)
EOSINOPHIL # BLD AUTO: 0.54 THOUSAND/ΜL (ref 0–0.61)
EOSINOPHIL NFR BLD AUTO: 11 % (ref 0–6)
ERYTHROCYTE [DISTWIDTH] IN BLOOD BY AUTOMATED COUNT: 14.7 % (ref 11.6–15.1)
GFR SERPL CREATININE-BSD FRML MDRD: 105 ML/MIN/1.73SQ M
GLUCOSE SERPL-MCNC: 92 MG/DL (ref 65–140)
HCT VFR BLD AUTO: 35.8 % (ref 36.5–49.3)
HGB BLD-MCNC: 11.8 G/DL (ref 12–17)
IMM GRANULOCYTES # BLD AUTO: 0.01 THOUSAND/UL (ref 0–0.2)
IMM GRANULOCYTES NFR BLD AUTO: 0 % (ref 0–2)
LYMPHOCYTES # BLD AUTO: 1.17 THOUSANDS/ΜL (ref 0.6–4.47)
LYMPHOCYTES NFR BLD AUTO: 24 % (ref 14–44)
MCH RBC QN AUTO: 29.3 PG (ref 26.8–34.3)
MCHC RBC AUTO-ENTMCNC: 33 G/DL (ref 31.4–37.4)
MCV RBC AUTO: 89 FL (ref 82–98)
MONOCYTES # BLD AUTO: 0.65 THOUSAND/ΜL (ref 0.17–1.22)
MONOCYTES NFR BLD AUTO: 13 % (ref 4–12)
NEUTROPHILS # BLD AUTO: 2.5 THOUSANDS/ΜL (ref 1.85–7.62)
NEUTS SEG NFR BLD AUTO: 51 % (ref 43–75)
NRBC BLD AUTO-RTO: 0 /100 WBCS
PLATELET # BLD AUTO: 119 THOUSANDS/UL (ref 149–390)
PMV BLD AUTO: 9.7 FL (ref 8.9–12.7)
POTASSIUM SERPL-SCNC: 4.1 MMOL/L (ref 3.5–5.3)
PROT SERPL-MCNC: 6 G/DL (ref 6.4–8.4)
RBC # BLD AUTO: 4.03 MILLION/UL (ref 3.88–5.62)
SODIUM SERPL-SCNC: 141 MMOL/L (ref 135–147)
WBC # BLD AUTO: 4.94 THOUSAND/UL (ref 4.31–10.16)

## 2023-04-21 RX ORDER — SODIUM CHLORIDE 9 MG/ML
20 INJECTION, SOLUTION INTRAVENOUS ONCE
Status: COMPLETED | OUTPATIENT
Start: 2023-04-21 | End: 2023-04-21

## 2023-04-21 RX ORDER — SODIUM CHLORIDE 9 MG/ML
20 INJECTION, SOLUTION INTRAVENOUS ONCE
OUTPATIENT
Start: 2023-05-19

## 2023-04-21 RX ORDER — DIPHENHYDRAMINE HCL 25 MG
50 TABLET ORAL ONCE
OUTPATIENT
Start: 2023-05-19

## 2023-04-21 RX ORDER — ACETAMINOPHEN 325 MG/1
650 TABLET ORAL ONCE
Status: COMPLETED | OUTPATIENT
Start: 2023-04-21 | End: 2023-04-21

## 2023-04-21 RX ORDER — ACETAMINOPHEN 325 MG/1
650 TABLET ORAL ONCE
OUTPATIENT
Start: 2023-05-19

## 2023-04-21 RX ORDER — DIPHENHYDRAMINE HCL 25 MG
50 TABLET ORAL ONCE
Status: COMPLETED | OUTPATIENT
Start: 2023-04-21 | End: 2023-04-21

## 2023-04-21 RX ADMIN — SODIUM CHLORIDE 20 ML/HR: 0.9 INJECTION, SOLUTION INTRAVENOUS at 09:04

## 2023-04-21 RX ADMIN — Medication 30 G: at 09:20

## 2023-04-21 RX ADMIN — DIPHENHYDRAMINE HYDROCHLORIDE 50 MG: 25 TABLET ORAL at 08:54

## 2023-04-21 RX ADMIN — ACETAMINOPHEN 650 MG: 325 TABLET ORAL at 08:53

## 2023-04-24 ENCOUNTER — HOSPITAL ENCOUNTER (OUTPATIENT)
Dept: INFUSION CENTER | Facility: HOSPITAL | Age: 53
Discharge: HOME | End: 2023-04-24
Attending: INTERNAL MEDICINE

## 2023-04-24 VITALS
SYSTOLIC BLOOD PRESSURE: 108 MMHG | HEIGHT: 73 IN | WEIGHT: 181.22 LBS | HEART RATE: 74 BPM | DIASTOLIC BLOOD PRESSURE: 65 MMHG | RESPIRATION RATE: 18 BRPM | BODY MASS INDEX: 24.02 KG/M2 | TEMPERATURE: 97.4 F

## 2023-04-24 DIAGNOSIS — D70.1 CHEMOTHERAPY INDUCED NEUTROPENIA (HCC): ICD-10-CM

## 2023-04-24 DIAGNOSIS — T45.1X5A CHEMOTHERAPY INDUCED NEUTROPENIA (HCC): ICD-10-CM

## 2023-04-24 DIAGNOSIS — D80.1 HYPOGAMMAGLOBULINEMIA (HCC): Primary | ICD-10-CM

## 2023-04-24 DIAGNOSIS — C82.38 GRADE 3A FOLLICULAR LYMPHOMA OF LYMPH NODES OF MULTIPLE REGIONS (HCC): ICD-10-CM

## 2023-04-24 RX ORDER — SODIUM CHLORIDE 9 MG/ML
20 INJECTION, SOLUTION INTRAVENOUS ONCE
Status: COMPLETED | OUTPATIENT
Start: 2023-04-24 | End: 2023-04-24

## 2023-04-24 RX ORDER — ACETAMINOPHEN 325 MG/1
650 TABLET ORAL ONCE
Status: COMPLETED | OUTPATIENT
Start: 2023-04-24 | End: 2023-04-24

## 2023-04-24 RX ADMIN — DIPHENHYDRAMINE HYDROCHLORIDE 25 MG: 50 INJECTION, SOLUTION INTRAMUSCULAR; INTRAVENOUS at 08:55

## 2023-04-24 RX ADMIN — OBINUTUZUMAB 1000 MG: 1000 INJECTION, SOLUTION, CONCENTRATE INTRAVENOUS at 09:21

## 2023-04-24 RX ADMIN — ACETAMINOPHEN 650 MG: 325 TABLET ORAL at 08:29

## 2023-04-24 RX ADMIN — DEXAMETHASONE SODIUM PHOSPHATE 20 MG: 10 INJECTION, SOLUTION INTRAMUSCULAR; INTRAVENOUS at 08:29

## 2023-04-24 RX ADMIN — BENDAMUSTINE HYDROCHLORIDE 195.3 MG: 25 INJECTION, SOLUTION INTRAVENOUS at 13:01

## 2023-04-24 RX ADMIN — SODIUM CHLORIDE 20 ML/HR: 0.9 INJECTION, SOLUTION INTRAVENOUS at 08:27

## 2023-04-25 ENCOUNTER — HOSPITAL ENCOUNTER (OUTPATIENT)
Dept: INFUSION CENTER | Facility: HOSPITAL | Age: 53
Discharge: HOME | End: 2023-04-25
Attending: INTERNAL MEDICINE

## 2023-04-25 VITALS
WEIGHT: 181.22 LBS | DIASTOLIC BLOOD PRESSURE: 75 MMHG | HEIGHT: 73 IN | SYSTOLIC BLOOD PRESSURE: 122 MMHG | TEMPERATURE: 97.7 F | HEART RATE: 64 BPM | BODY MASS INDEX: 24.02 KG/M2 | RESPIRATION RATE: 18 BRPM

## 2023-04-25 DIAGNOSIS — D70.1 CHEMOTHERAPY INDUCED NEUTROPENIA (HCC): ICD-10-CM

## 2023-04-25 DIAGNOSIS — C82.38 GRADE 3A FOLLICULAR LYMPHOMA OF LYMPH NODES OF MULTIPLE REGIONS (HCC): ICD-10-CM

## 2023-04-25 DIAGNOSIS — D80.1 HYPOGAMMAGLOBULINEMIA (HCC): Primary | ICD-10-CM

## 2023-04-25 DIAGNOSIS — T45.1X5A CHEMOTHERAPY INDUCED NEUTROPENIA (HCC): ICD-10-CM

## 2023-04-25 RX ORDER — SODIUM CHLORIDE 9 MG/ML
20 INJECTION, SOLUTION INTRAVENOUS ONCE
Status: COMPLETED | OUTPATIENT
Start: 2023-04-25 | End: 2023-04-25

## 2023-04-25 RX ADMIN — PEGFILGRASTIM 6 MG: KIT SUBCUTANEOUS at 09:25

## 2023-04-25 RX ADMIN — SODIUM CHLORIDE 20 ML/HR: 9 INJECTION, SOLUTION INTRAVENOUS at 08:24

## 2023-04-25 RX ADMIN — DEXAMETHASONE SODIUM PHOSPHATE: 10 INJECTION, SOLUTION INTRAMUSCULAR; INTRAVENOUS at 08:24

## 2023-04-25 RX ADMIN — BENDAMUSTINE HYDROCHLORIDE 195.3 MG: 25 INJECTION, SOLUTION INTRAVENOUS at 09:19

## 2023-05-15 ENCOUNTER — OFFICE VISIT (OUTPATIENT)
Dept: HEMATOLOGY ONCOLOGY | Facility: CLINIC | Age: 53
End: 2023-05-15

## 2023-05-15 ENCOUNTER — TELEPHONE (OUTPATIENT)
Dept: HEMATOLOGY ONCOLOGY | Facility: CLINIC | Age: 53
End: 2023-05-15

## 2023-05-15 VITALS
TEMPERATURE: 97.4 F | WEIGHT: 185 LBS | RESPIRATION RATE: 15 BRPM | SYSTOLIC BLOOD PRESSURE: 108 MMHG | DIASTOLIC BLOOD PRESSURE: 80 MMHG | BODY MASS INDEX: 24.52 KG/M2 | OXYGEN SATURATION: 100 % | HEART RATE: 60 BPM | HEIGHT: 73 IN

## 2023-05-15 DIAGNOSIS — T45.1X5A CHEMOTHERAPY INDUCED NEUTROPENIA (HCC): ICD-10-CM

## 2023-05-15 DIAGNOSIS — D70.1 CHEMOTHERAPY INDUCED NEUTROPENIA (HCC): ICD-10-CM

## 2023-05-15 DIAGNOSIS — D80.1 HYPOGAMMAGLOBULINEMIA (HCC): ICD-10-CM

## 2023-05-15 DIAGNOSIS — C82.38 GRADE 3A FOLLICULAR LYMPHOMA OF LYMPH NODES OF MULTIPLE REGIONS (HCC): Primary | ICD-10-CM

## 2023-05-15 PROBLEM — R53.83 FATIGUE: Status: RESOLVED | Noted: 2020-12-10 | Resolved: 2023-05-15

## 2023-05-15 RX ORDER — SODIUM CHLORIDE 9 MG/ML
20 INJECTION, SOLUTION INTRAVENOUS ONCE
Status: CANCELLED | OUTPATIENT
Start: 2023-05-22

## 2023-05-15 RX ORDER — ACETAMINOPHEN 325 MG/1
650 TABLET ORAL ONCE
Status: CANCELLED | OUTPATIENT
Start: 2023-05-22

## 2023-05-15 RX ORDER — SODIUM CHLORIDE 9 MG/ML
20 INJECTION, SOLUTION INTRAVENOUS ONCE
Status: CANCELLED | OUTPATIENT
Start: 2023-05-23

## 2023-05-15 NOTE — PROGRESS NOTES
Hematology Outpatient Follow - Up Note  Kavon Talbert 46 y o  male MRN: @ Encounter: [id number]        Date:  5/15/2023        Assessment/ Plan:    history of stage IV follicular lymphoma grade 3B, biopsy proven 5/2017 when he presented with weight loss, splenomegaly, constitutional symptoms, multiple lymph nodes above and below the diaphragm  Bone marrow biopsy 6/2017 showed 10% involvement with follicular lymphoma  He was treated with R-CHOP for total of 6 cycles spanning from May 2017 until September 2017  Subsequent PET scan showed no evidence of disease and no evidence of splenomegaly or lymphadenopathy     He received maintenance rituximab 375 milligram/meter squared every 2 months for total of 2 years finished in October 2019      Relapse of disease by PET scan in September 2020 which showed innumerable bilateral cervical, axillary, mediastinal, retroperitoneal, bilateral iliac, inguinal lymph nodes, splenic involvement      Excisional biopsy of a right inguinal lymph node 3/09/70 [facility] relapsed follicular lymphoma, no evidence of transformation, positive for CD 19, CD 20, CD 10, Ki-67 about 60%  Recurrence of disease by PET scan on 7/2022, constitutional symptoms in December 2022 requiring inguinal biopsy showed follicular lymphoma LT-89 about 10% positive for Bcl-2, BCL6, CD20, negative for CD5, CD10, cyclin D1     Evaluated at [facility] [address] multiple option were offered, including CAR-T, bispecifics, Bendamustine/anti-CD20     Initiated on obinutuzumab/Bendamustine he received 3 cycles, PET scan showed essentially resolved hypermetabolism in the neck chest abdomen and pelvis continue treatment and after finishing a total of 6 cycles we will do another PET scan and he will follow-up at [facility] [address] [place] for discussion regarding maintenance obinutuzumab versus observation        Labs and imaging studies are reviewed by ordering provider once results are available   If there are findings that need immediate attention, you will be contacted when results available  Discussing results and the implication on your healthcare is best discussed in person at your follow-up visit  HPI:    Ekaterina Alanis a 89 V  o  male with follicular lymphoma   He presented in 2016 with splenomegaly, weight loss, constitutional symptoms      Multiple enlarged lymph nodes noted above and below the diaphragm  Excisional biopsy 5/2017 of 1 of the retroperitoneal lymph node confirmed the diagnosis     Bone marrow biopsy 6/2/2017 showed 10 percent involvement with follicular lymphoma      No evidence of transformation, 2nd opinion at [address] [place] [address] agreed on treatment with R-CHOP     He received 6 cycles of R-CHOP spanning from May 2017 until September 2017      Subsequent PET scan showed no evidence of disease and physical examination showed disappearance of splenomegaly and lymphadenopathy     Maintenance rituximab 375 milligram/meter squared every 2 months for total of 2 years finished in October 2019     PET scan in January 2020 showed uptake in the right inguinal area however patient did not have any constitutional symptoms  He had normal CBC, CMP, LDH we decided to watch and observe   At that time physical examination showed 1 5 cm left inguinal lymph node     Repeat PET scan in March 2020 showed uptake with SUV between 3 and 5 in the mediastinum, right perihilar area, no evidence of uptake in the abdomen or pelvis specially no uptake in the left inguinal area     In June 2020 cough intermittent without sputum production especially in the midthoracic area, denies any constitutional symptoms, CBC, CMP, LDH are normal     Repeat PET scan on 09/03/2020 showed progression of disease with innumerable new glucose avid lymph nodes throughout the neck, chest, retroperitoneal, iliac, inguinal regions, splenic uptake, no evidence of bulky disease with score of 5, mild increase in the spleen size        Status post excision biopsy of the right inguinal lymph node on 09/22/2020  A  Right inguinal lymph node:  - Follicular lymphoma, follicular pattern, grade 3A (average of 17 centroblasts/HPF) - see Note  - Flow cytometry ([facility]#[id number], evaluated by Dr Joseph Viveros) reveals:   * CD10+ B-cell lymphoma, comprising 70% of total cells analyzed, of small to medium size with following immunophenotype:    -- positive: CD19, CD20, CD10, CD38, sKappa    -- negative: sLambda, CD5, CD11c, CD23   * Viability 7AAD: 89%    * The following antigens were evaluated & found to be expressed as described above or by appropriate cells:  CD2, CD3, CD4, CD5, CD7, CD8, CD10, CD11c, CD19, CD20, CD23, CD38, CD45, CD56, CD57, FMC7, sKappa, sLambda      There was no evidence of transformation, he was kept on watchful observation      COVID-19 infection in December 2020 with full recovery     On 06/2022 he felt abdominal pressure after drinking beer, by physical examination he was found to have splenomegaly, PET scan showed multiple lymphadenopathy in the cervical, supra and infraclavicular, axillary, abdominal, mediastinal, inguinal area the largest in the retroperitoneal area up to 7 cm, spleen is enlarged as well     Repeat biopsy of the inguinal area at [address] showed low-grade follicular lymphoma LI-41 of 10% positive for CD20, PAX5, Bcl-2, BCL6, CD10, negative for CD5, CD23, cyclin D1  Interval History:        Previous Treatment:         Test Results:    Imaging: No results found      Labs:   Lab Results   Component Value Date    WBC 4 94 04/21/2023    HGB 11 8 (L) 04/21/2023    HCT 35 8 (L) 04/21/2023    MCV 89 04/21/2023     (L) 04/21/2023     Lab Results   Component Value Date     08/16/2017    K 4 1 04/21/2023     04/21/2023    CO2 28 04/21/2023    BUN 13 04/21/2023    CREATININE 0 73 04/21/2023    GLUF 86 02/02/2023    CALCIUM 8 9 04/21/2023    CORRECTEDCA 9 2 12/30/2020    AST 18 04/21/2023 ALT 16 04/21/2023    ALKPHOS 87 04/21/2023    PROT 6 3 08/16/2017    BILITOT 0 5 08/16/2017    EGFR 105 04/21/2023       Lab Results   Component Value Date    IRON 103 07/01/2019    FERRITIN 187 07/01/2019       No results found for: [id number]      ROS: Review of Systems   Constitutional: Negative  Negative for appetite change, chills, diaphoresis, fatigue, fever and unexpected weight change  HENT:   Negative for hearing loss, lump/mass, mouth sores, nosebleeds, sore throat, trouble swallowing and voice change  Eyes: Negative  Negative for eye problems and icterus  Respiratory: Negative  Negative for chest tightness, cough, hemoptysis and shortness of breath  Cardiovascular: Negative for chest pain and leg swelling  Gastrointestinal: Negative for abdominal distention, abdominal pain, blood in stool, constipation, diarrhea and nausea  Endocrine: Negative  Genitourinary: Negative for dysuria, frequency, hematuria and pelvic pain  Musculoskeletal: Negative  Negative for arthralgias, back pain, flank pain, gait problem, myalgias and neck stiffness  Skin: Negative for itching and rash  Neurological: Negative for dizziness, gait problem, headaches, light-headedness, numbness and speech difficulty  Hematological: Negative for adenopathy  Does not bruise/bleed easily  Psychiatric/Behavioral: Negative for confusion, decreased concentration, depression and sleep disturbance  The patient is not nervous/anxious  Current Medications: Reviewed  Allergies: Reviewed  PMH/FH/SH:  Reviewed      Physical Exam:    Body surface area is 2 08 meters squared      Wt Readings from Last 3 Encounters:   05/15/23 83 9 kg (185 lb)   04/25/23 82 2 kg (181 lb 3 5 oz)   04/24/23 82 2 kg (181 lb 3 5 oz)        Temp Readings from Last 3 Encounters:   05/15/23 (!) 97 4 °F (36 3 °C) (Temporal)   04/25/23 97 7 °F (36 5 °C) (Temporal)   04/24/23 (!) 97 4 °F (36 3 °C) (Temporal)        BP Readings from Last 3 Encounters:   05/15/23 108/80   23 122/75   23 108/65         Pulse Readings from Last 3 Encounters:   05/15/23 60   23 64   23 74        Physical Exam  Vitals reviewed  Constitutional:       General: He is not in acute distress  Appearance: He is well-developed  He is not diaphoretic  HENT:      Head: Normocephalic and atraumatic  Eyes:      Conjunctiva/sclera: Conjunctivae normal    Neck:      Trachea: No tracheal deviation  Cardiovascular:      Rate and Rhythm: Normal rate and regular rhythm  Heart sounds: No murmur heard  No friction rub  No gallop  Pulmonary:      Effort: Pulmonary effort is normal  No respiratory distress  Breath sounds: Normal breath sounds  No wheezing or rales  Chest:      Chest wall: No tenderness  Abdominal:      General: There is no distension  Palpations: Abdomen is soft  Tenderness: There is no abdominal tenderness  Musculoskeletal:      Cervical back: Normal range of motion and neck supple  Right lower leg: No edema  Left lower leg: No edema  Lymphadenopathy:      Cervical: No cervical adenopathy  Skin:     General: Skin is warm and dry  Coloration: Skin is not pale  Findings: No erythema  Neurological:      Mental Status: He is alert and oriented to person, place, and time  Psychiatric:         Behavior: Behavior normal          Thought Content: Thought content normal          Judgment: Judgment normal          ECO    Goals and Barriers:  Current Goal: Minimize effects of disease  Barriers: None  Patient's Capacity to Self Care:  Patient is able to self care      Code Status: [unfilled]

## 2023-05-16 DIAGNOSIS — D80.1 HYPOGAMMAGLOBULINEMIA (HCC): Primary | ICD-10-CM

## 2023-05-17 ENCOUNTER — APPOINTMENT (OUTPATIENT)
Dept: LAB | Age: 53
End: 2023-05-17

## 2023-05-17 DIAGNOSIS — D70.1 CHEMOTHERAPY INDUCED NEUTROPENIA (HCC): ICD-10-CM

## 2023-05-17 DIAGNOSIS — C82.38 GRADE 3A FOLLICULAR LYMPHOMA OF LYMPH NODES OF MULTIPLE REGIONS (HCC): ICD-10-CM

## 2023-05-17 DIAGNOSIS — D80.1 HYPOGAMMAGLOBULINEMIA (HCC): ICD-10-CM

## 2023-05-17 DIAGNOSIS — T45.1X5A CHEMOTHERAPY INDUCED NEUTROPENIA (HCC): ICD-10-CM

## 2023-05-17 LAB
ALBUMIN SERPL BCP-MCNC: 3.7 G/DL (ref 3.5–5)
ALP SERPL-CCNC: 127 U/L (ref 46–116)
ALT SERPL W P-5'-P-CCNC: 30 U/L (ref 12–78)
ANION GAP SERPL CALCULATED.3IONS-SCNC: 0 MMOL/L (ref 4–13)
AST SERPL W P-5'-P-CCNC: 24 U/L (ref 5–45)
BASOPHILS # BLD AUTO: 0.09 THOUSANDS/ÂΜL (ref 0–0.1)
BASOPHILS NFR BLD AUTO: 2 % (ref 0–1)
BILIRUB SERPL-MCNC: 0.71 MG/DL (ref 0.2–1)
BUN SERPL-MCNC: 15 MG/DL (ref 5–25)
CALCIUM SERPL-MCNC: 8.5 MG/DL (ref 8.3–10.1)
CHLORIDE SERPL-SCNC: 109 MMOL/L (ref 96–108)
CO2 SERPL-SCNC: 28 MMOL/L (ref 21–32)
CREAT SERPL-MCNC: 0.85 MG/DL (ref 0.6–1.3)
EOSINOPHIL # BLD AUTO: 0.53 THOUSAND/ÂΜL (ref 0–0.61)
EOSINOPHIL NFR BLD AUTO: 10 % (ref 0–6)
ERYTHROCYTE [DISTWIDTH] IN BLOOD BY AUTOMATED COUNT: 14.4 % (ref 11.6–15.1)
GFR SERPL CREATININE-BSD FRML MDRD: 98 ML/MIN/1.73SQ M
GLUCOSE SERPL-MCNC: 90 MG/DL (ref 65–140)
HCT VFR BLD AUTO: 36.6 % (ref 36.5–49.3)
HGB BLD-MCNC: 12.6 G/DL (ref 12–17)
IGA SERPL-MCNC: 56 MG/DL (ref 70–400)
IGG SERPL-MCNC: 930 MG/DL (ref 700–1600)
IGM SERPL-MCNC: 14 MG/DL (ref 40–230)
IMM GRANULOCYTES # BLD AUTO: 0.01 THOUSAND/UL (ref 0–0.2)
IMM GRANULOCYTES NFR BLD AUTO: 0 % (ref 0–2)
LYMPHOCYTES # BLD AUTO: 0.77 THOUSANDS/ÂΜL (ref 0.6–4.47)
LYMPHOCYTES NFR BLD AUTO: 14 % (ref 14–44)
MCH RBC QN AUTO: 30.2 PG (ref 26.8–34.3)
MCHC RBC AUTO-ENTMCNC: 34.4 G/DL (ref 31.4–37.4)
MCV RBC AUTO: 88 FL (ref 82–98)
MONOCYTES # BLD AUTO: 0.68 THOUSAND/ÂΜL (ref 0.17–1.22)
MONOCYTES NFR BLD AUTO: 12 % (ref 4–12)
NEUTROPHILS # BLD AUTO: 3.52 THOUSANDS/ÂΜL (ref 1.85–7.62)
NEUTS SEG NFR BLD AUTO: 62 % (ref 43–75)
NRBC BLD AUTO-RTO: 0 /100 WBCS
PLATELET # BLD AUTO: 168 THOUSANDS/UL (ref 149–390)
PMV BLD AUTO: 9.9 FL (ref 8.9–12.7)
POTASSIUM SERPL-SCNC: 3.7 MMOL/L (ref 3.5–5.3)
PROT SERPL-MCNC: 6.6 G/DL (ref 6.4–8.4)
RBC # BLD AUTO: 4.17 MILLION/UL (ref 3.88–5.62)
SODIUM SERPL-SCNC: 137 MMOL/L (ref 135–147)
WBC # BLD AUTO: 5.6 THOUSAND/UL (ref 4.31–10.16)

## 2023-05-19 ENCOUNTER — HOSPITAL ENCOUNTER (OUTPATIENT)
Dept: INFUSION CENTER | Facility: HOSPITAL | Age: 53
End: 2023-05-19
Attending: INTERNAL MEDICINE

## 2023-05-22 ENCOUNTER — HOSPITAL ENCOUNTER (OUTPATIENT)
Dept: INFUSION CENTER | Facility: HOSPITAL | Age: 53
Discharge: HOME | End: 2023-05-22
Attending: INTERNAL MEDICINE

## 2023-05-22 VITALS
HEIGHT: 73 IN | SYSTOLIC BLOOD PRESSURE: 103 MMHG | OXYGEN SATURATION: 100 % | TEMPERATURE: 98.9 F | WEIGHT: 185.19 LBS | DIASTOLIC BLOOD PRESSURE: 66 MMHG | BODY MASS INDEX: 24.54 KG/M2 | RESPIRATION RATE: 18 BRPM | HEART RATE: 71 BPM

## 2023-05-22 DIAGNOSIS — C82.38 GRADE 3A FOLLICULAR LYMPHOMA OF LYMPH NODES OF MULTIPLE REGIONS (HCC): ICD-10-CM

## 2023-05-22 DIAGNOSIS — D70.1 CHEMOTHERAPY INDUCED NEUTROPENIA (HCC): ICD-10-CM

## 2023-05-22 DIAGNOSIS — D80.1 HYPOGAMMAGLOBULINEMIA (HCC): Primary | ICD-10-CM

## 2023-05-22 DIAGNOSIS — T45.1X5A CHEMOTHERAPY INDUCED NEUTROPENIA (HCC): ICD-10-CM

## 2023-05-22 RX ORDER — SODIUM CHLORIDE 9 MG/ML
20 INJECTION, SOLUTION INTRAVENOUS ONCE
Status: COMPLETED | OUTPATIENT
Start: 2023-05-22 | End: 2023-05-22

## 2023-05-22 RX ORDER — ACETAMINOPHEN 325 MG/1
650 TABLET ORAL ONCE
Status: COMPLETED | OUTPATIENT
Start: 2023-05-22 | End: 2023-05-22

## 2023-05-22 RX ADMIN — ACETAMINOPHEN 650 MG: 325 TABLET ORAL at 08:29

## 2023-05-22 RX ADMIN — DEXAMETHASONE SODIUM PHOSPHATE 20 MG: 10 INJECTION, SOLUTION INTRAMUSCULAR; INTRAVENOUS at 08:28

## 2023-05-22 RX ADMIN — OBINUTUZUMAB 1000 MG: 1000 INJECTION, SOLUTION, CONCENTRATE INTRAVENOUS at 09:55

## 2023-05-22 RX ADMIN — SODIUM CHLORIDE 20 ML/HR: 0.9 INJECTION, SOLUTION INTRAVENOUS at 08:27

## 2023-05-22 RX ADMIN — DIPHENHYDRAMINE HYDROCHLORIDE 25 MG: 50 INJECTION, SOLUTION INTRAMUSCULAR; INTRAVENOUS at 08:50

## 2023-05-22 RX ADMIN — BENDAMUSTINE HYDROCHLORIDE 195.3 MG: 25 INJECTION, SOLUTION INTRAVENOUS at 13:23

## 2023-05-22 NOTE — PROGRESS NOTES
Patient states about 2 days after follow up with dr Shaun Dejesus last week, started with left abdominal pain, spleen area, same as before when cancer started  The pain has been a 3/10 no greater for past 5 days    Advised with Mehdi Westbrook RN

## 2023-05-22 NOTE — PROGRESS NOTES
Pt tolerated gazyva/bendeka well today without complications  confirmed appt back for day 2 bendeka tomorrow

## 2023-05-23 ENCOUNTER — HOSPITAL ENCOUNTER (OUTPATIENT)
Dept: INFUSION CENTER | Facility: HOSPITAL | Age: 53
Discharge: HOME | End: 2023-05-23
Attending: INTERNAL MEDICINE

## 2023-05-23 VITALS
DIASTOLIC BLOOD PRESSURE: 73 MMHG | WEIGHT: 185.19 LBS | HEIGHT: 73 IN | SYSTOLIC BLOOD PRESSURE: 114 MMHG | TEMPERATURE: 97.8 F | HEART RATE: 70 BPM | BODY MASS INDEX: 24.54 KG/M2 | RESPIRATION RATE: 18 BRPM

## 2023-05-23 DIAGNOSIS — D80.1 HYPOGAMMAGLOBULINEMIA (HCC): Primary | ICD-10-CM

## 2023-05-23 DIAGNOSIS — T45.1X5A CHEMOTHERAPY INDUCED NEUTROPENIA (HCC): ICD-10-CM

## 2023-05-23 DIAGNOSIS — D70.1 CHEMOTHERAPY INDUCED NEUTROPENIA (HCC): ICD-10-CM

## 2023-05-23 DIAGNOSIS — C82.38 GRADE 3A FOLLICULAR LYMPHOMA OF LYMPH NODES OF MULTIPLE REGIONS (HCC): ICD-10-CM

## 2023-05-23 RX ORDER — SODIUM CHLORIDE 9 MG/ML
20 INJECTION, SOLUTION INTRAVENOUS ONCE
Status: COMPLETED | OUTPATIENT
Start: 2023-05-23 | End: 2023-05-23

## 2023-05-23 RX ADMIN — PEGFILGRASTIM 6 MG: KIT SUBCUTANEOUS at 09:10

## 2023-05-23 RX ADMIN — DEXAMETHASONE SODIUM PHOSPHATE: 10 INJECTION, SOLUTION INTRAMUSCULAR; INTRAVENOUS at 08:31

## 2023-05-23 RX ADMIN — SODIUM CHLORIDE 20 ML/HR: 0.9 INJECTION, SOLUTION INTRAVENOUS at 08:31

## 2023-05-23 RX ADMIN — BENDAMUSTINE HYDROCHLORIDE 195.3 MG: 25 INJECTION, SOLUTION INTRAVENOUS at 09:08

## 2023-05-23 NOTE — PROGRESS NOTES
Pt stated spleen pain slightly better today, otherwise tolerated bendeka day 2 today without complications  confirmed next appt, AVS declined

## 2023-06-09 ENCOUNTER — TELEPHONE (OUTPATIENT)
Dept: HEMATOLOGY ONCOLOGY | Facility: CLINIC | Age: 53
End: 2023-06-09

## 2023-06-09 ENCOUNTER — OFFICE VISIT (OUTPATIENT)
Dept: HEMATOLOGY ONCOLOGY | Facility: CLINIC | Age: 53
End: 2023-06-09
Payer: COMMERCIAL

## 2023-06-09 VITALS
WEIGHT: 185 LBS | RESPIRATION RATE: 21 BRPM | BODY MASS INDEX: 25.06 KG/M2 | DIASTOLIC BLOOD PRESSURE: 78 MMHG | TEMPERATURE: 97.2 F | HEIGHT: 72 IN | HEART RATE: 85 BPM | SYSTOLIC BLOOD PRESSURE: 110 MMHG | OXYGEN SATURATION: 98 %

## 2023-06-09 DIAGNOSIS — C82.38 GRADE 3A FOLLICULAR LYMPHOMA OF LYMPH NODES OF MULTIPLE REGIONS (HCC): ICD-10-CM

## 2023-06-09 DIAGNOSIS — D80.1 HYPOGAMMAGLOBULINEMIA (HCC): Primary | ICD-10-CM

## 2023-06-09 DIAGNOSIS — B02.29 POST HERPETIC NEURALGIA: ICD-10-CM

## 2023-06-09 PROCEDURE — 99214 OFFICE O/P EST MOD 30 MIN: CPT | Performed by: PHYSICIAN ASSISTANT

## 2023-06-09 NOTE — TELEPHONE ENCOUNTER
Message  Received: Today  CYNTHIA Limon, NORI; Trey Gomez, RN  Patient going to [facility] for vacation   Returning 6/20/23 (day before cycle 6 of treatment)     Can infusion draw stat CBCD, CMP and start premeds for rituxan 6/21/23? Added a nursing communication to cycle 6 for above

## 2023-06-14 DIAGNOSIS — C82.38 GRADE 3A FOLLICULAR LYMPHOMA OF LYMPH NODES OF MULTIPLE REGIONS (HCC): Primary | ICD-10-CM

## 2023-06-14 RX ORDER — ACETAMINOPHEN 325 MG/1
650 TABLET ORAL ONCE
Status: CANCELLED | OUTPATIENT
Start: 2023-06-21

## 2023-06-14 RX ORDER — SODIUM CHLORIDE 9 MG/ML
20 INJECTION, SOLUTION INTRAVENOUS ONCE
Status: CANCELLED | OUTPATIENT
Start: 2023-06-21

## 2023-06-14 RX ORDER — SODIUM CHLORIDE 9 MG/ML
20 INJECTION, SOLUTION INTRAVENOUS ONCE
Status: CANCELLED | OUTPATIENT
Start: 2023-06-22

## 2023-06-21 ENCOUNTER — HOSPITAL ENCOUNTER (OUTPATIENT)
Dept: INFUSION CENTER | Facility: HOSPITAL | Age: 53
Discharge: HOME | End: 2023-06-21
Attending: INTERNAL MEDICINE
Payer: COMMERCIAL

## 2023-06-21 VITALS
WEIGHT: 188.5 LBS | HEART RATE: 68 BPM | TEMPERATURE: 97.5 F | SYSTOLIC BLOOD PRESSURE: 110 MMHG | RESPIRATION RATE: 20 BRPM | HEIGHT: 73 IN | BODY MASS INDEX: 24.98 KG/M2 | DIASTOLIC BLOOD PRESSURE: 67 MMHG

## 2023-06-21 DIAGNOSIS — D80.1 HYPOGAMMAGLOBULINEMIA (HCC): Primary | ICD-10-CM

## 2023-06-21 DIAGNOSIS — T45.1X5A CHEMOTHERAPY INDUCED NEUTROPENIA (HCC): ICD-10-CM

## 2023-06-21 DIAGNOSIS — C82.38 GRADE 3A FOLLICULAR LYMPHOMA OF LYMPH NODES OF MULTIPLE REGIONS (HCC): ICD-10-CM

## 2023-06-21 DIAGNOSIS — D70.1 CHEMOTHERAPY INDUCED NEUTROPENIA (HCC): ICD-10-CM

## 2023-06-21 LAB
ALBUMIN SERPL BCP-MCNC: 4.1 G/DL (ref 3.5–5)
ALP SERPL-CCNC: 109 U/L (ref 34–104)
ALT SERPL W P-5'-P-CCNC: 22 U/L (ref 7–52)
ANION GAP SERPL CALCULATED.3IONS-SCNC: 7 MMOL/L
AST SERPL W P-5'-P-CCNC: 22 U/L (ref 13–39)
BASOPHILS # BLD AUTO: 0.07 THOUSANDS/ÂΜL (ref 0–0.1)
BASOPHILS NFR BLD AUTO: 1 % (ref 0–1)
BILIRUB SERPL-MCNC: 1.03 MG/DL (ref 0.2–1)
BUN SERPL-MCNC: 16 MG/DL (ref 5–25)
CALCIUM SERPL-MCNC: 8.9 MG/DL (ref 8.4–10.2)
CHLORIDE SERPL-SCNC: 107 MMOL/L (ref 96–108)
CO2 SERPL-SCNC: 26 MMOL/L (ref 21–32)
CREAT SERPL-MCNC: 0.81 MG/DL (ref 0.6–1.3)
EOSINOPHIL # BLD AUTO: 0.61 THOUSAND/ÂΜL (ref 0–0.61)
EOSINOPHIL NFR BLD AUTO: 13 % (ref 0–6)
ERYTHROCYTE [DISTWIDTH] IN BLOOD BY AUTOMATED COUNT: 13.8 % (ref 11.6–15.1)
GFR SERPL CREATININE-BSD FRML MDRD: 100 ML/MIN/1.73SQ M
GLUCOSE SERPL-MCNC: 107 MG/DL (ref 65–140)
HCT VFR BLD AUTO: 37.4 % (ref 36.5–49.3)
HGB BLD-MCNC: 12.7 G/DL (ref 12–17)
IMM GRANULOCYTES # BLD AUTO: 0.01 THOUSAND/UL (ref 0–0.2)
IMM GRANULOCYTES NFR BLD AUTO: 0 % (ref 0–2)
LYMPHOCYTES # BLD AUTO: 0.5 THOUSANDS/ÂΜL (ref 0.6–4.47)
LYMPHOCYTES NFR BLD AUTO: 10 % (ref 14–44)
MCH RBC QN AUTO: 29.7 PG (ref 26.8–34.3)
MCHC RBC AUTO-ENTMCNC: 34 G/DL (ref 31.4–37.4)
MCV RBC AUTO: 87 FL (ref 82–98)
MONOCYTES # BLD AUTO: 0.63 THOUSAND/ÂΜL (ref 0.17–1.22)
MONOCYTES NFR BLD AUTO: 13 % (ref 4–12)
NEUTROPHILS # BLD AUTO: 3.03 THOUSANDS/ÂΜL (ref 1.85–7.62)
NEUTS SEG NFR BLD AUTO: 63 % (ref 43–75)
NRBC BLD AUTO-RTO: 0 /100 WBCS
PLATELET # BLD AUTO: 118 THOUSANDS/UL (ref 149–390)
PMV BLD AUTO: 10 FL (ref 8.9–12.7)
POTASSIUM SERPL-SCNC: 3.7 MMOL/L (ref 3.5–5.3)
PROT SERPL-MCNC: 6.1 G/DL (ref 6.4–8.4)
RBC # BLD AUTO: 4.28 MILLION/UL (ref 3.88–5.62)
SODIUM SERPL-SCNC: 140 MMOL/L (ref 135–147)
WBC # BLD AUTO: 4.85 THOUSAND/UL (ref 4.31–10.16)

## 2023-06-21 PROCEDURE — 80053 COMPREHEN METABOLIC PANEL: CPT | Performed by: INTERNAL MEDICINE

## 2023-06-21 PROCEDURE — 85025 COMPLETE CBC W/AUTO DIFF WBC: CPT | Performed by: INTERNAL MEDICINE

## 2023-06-21 PROCEDURE — 96367 TX/PROPH/DG ADDL SEQ IV INF: CPT

## 2023-06-21 PROCEDURE — 96417 CHEMO IV INFUS EACH ADDL SEQ: CPT

## 2023-06-21 PROCEDURE — 96415 CHEMO IV INFUSION ADDL HR: CPT

## 2023-06-21 PROCEDURE — 96413 CHEMO IV INFUSION 1 HR: CPT

## 2023-06-21 RX ORDER — ACETAMINOPHEN 325 MG/1
650 TABLET ORAL ONCE
Status: COMPLETED | OUTPATIENT
Start: 2023-06-21 | End: 2023-06-21

## 2023-06-21 RX ORDER — SODIUM CHLORIDE 9 MG/ML
20 INJECTION, SOLUTION INTRAVENOUS ONCE
Status: COMPLETED | OUTPATIENT
Start: 2023-06-21 | End: 2023-06-21

## 2023-06-21 RX ADMIN — DEXAMETHASONE SODIUM PHOSPHATE 20 MG: 10 INJECTION INTRAMUSCULAR; INTRAVENOUS at 08:34

## 2023-06-21 RX ADMIN — ACETAMINOPHEN 650 MG: 325 TABLET ORAL at 08:32

## 2023-06-21 RX ADMIN — SODIUM CHLORIDE 20 ML/HR: 0.9 INJECTION, SOLUTION INTRAVENOUS at 08:32

## 2023-06-21 RX ADMIN — OBINUTUZUMAB 1000 MG: 1000 INJECTION, SOLUTION, CONCENTRATE INTRAVENOUS at 10:31

## 2023-06-21 RX ADMIN — BENDAMUSTINE HYDROCHLORIDE 195.3 MG: 25 INJECTION, SOLUTION INTRAVENOUS at 10:05

## 2023-06-21 RX ADMIN — DIPHENHYDRAMINE HYDROCHLORIDE 25 MG: 50 INJECTION, SOLUTION INTRAMUSCULAR; INTRAVENOUS at 09:01

## 2023-06-22 ENCOUNTER — HOSPITAL ENCOUNTER (OUTPATIENT)
Dept: INFUSION CENTER | Facility: HOSPITAL | Age: 53
End: 2023-06-22
Attending: INTERNAL MEDICINE
Payer: COMMERCIAL

## 2023-06-22 VITALS
OXYGEN SATURATION: 97 % | TEMPERATURE: 97.5 F | SYSTOLIC BLOOD PRESSURE: 118 MMHG | WEIGHT: 188.5 LBS | DIASTOLIC BLOOD PRESSURE: 76 MMHG | HEART RATE: 66 BPM | RESPIRATION RATE: 18 BRPM | HEIGHT: 73 IN | BODY MASS INDEX: 24.98 KG/M2

## 2023-06-22 DIAGNOSIS — C82.38 GRADE 3A FOLLICULAR LYMPHOMA OF LYMPH NODES OF MULTIPLE REGIONS (HCC): ICD-10-CM

## 2023-06-22 DIAGNOSIS — D80.1 HYPOGAMMAGLOBULINEMIA (HCC): Primary | ICD-10-CM

## 2023-06-22 DIAGNOSIS — D70.1 CHEMOTHERAPY INDUCED NEUTROPENIA (HCC): ICD-10-CM

## 2023-06-22 DIAGNOSIS — T45.1X5A CHEMOTHERAPY INDUCED NEUTROPENIA (HCC): ICD-10-CM

## 2023-06-22 PROCEDURE — 96409 CHEMO IV PUSH SNGL DRUG: CPT

## 2023-06-22 PROCEDURE — 96377 APPLICATON ON-BODY INJECTOR: CPT

## 2023-06-22 PROCEDURE — 96367 TX/PROPH/DG ADDL SEQ IV INF: CPT

## 2023-06-22 RX ORDER — SODIUM CHLORIDE 9 MG/ML
20 INJECTION, SOLUTION INTRAVENOUS ONCE
Status: COMPLETED | OUTPATIENT
Start: 2023-06-22 | End: 2023-06-22

## 2023-06-22 RX ADMIN — DEXAMETHASONE SODIUM PHOSPHATE: 10 INJECTION INTRAMUSCULAR; INTRAVENOUS at 08:34

## 2023-06-22 RX ADMIN — PEGFILGRASTIM 6 MG: KIT SUBCUTANEOUS at 09:35

## 2023-06-22 RX ADMIN — BENDAMUSTINE HYDROCHLORIDE 195.3 MG: 25 INJECTION, SOLUTION INTRAVENOUS at 09:09

## 2023-06-22 RX ADMIN — SODIUM CHLORIDE 20 ML/HR: 0.9 INJECTION, SOLUTION INTRAVENOUS at 08:34

## 2023-06-22 NOTE — PROGRESS NOTES
Pt tolerated treatment with no adv reactions; On Pro applied; green light flashing and level full; pt has f/u appt with Annmarie Salvador; pt left unit ambulatory with steady gait

## 2023-07-05 ENCOUNTER — TELEPHONE (OUTPATIENT)
Dept: HEMATOLOGY ONCOLOGY | Facility: CLINIC | Age: 53
End: 2023-07-05

## 2023-07-05 NOTE — TELEPHONE ENCOUNTER
Made attempt to call the patient to advise on his follow up with Evelin Ryan. 7/13/23  10:20.     Appt was scheduled wrong for only 10 min and needs to be seen for 20 min as he is a treatment pt. 'I left a voicemail detailing this and instructing patient to call back [facility] [phone] number to arrange a new follow up appointment

## 2023-07-05 NOTE — TELEPHONE ENCOUNTER
Appointment Change  Cancel, Reschedule, Change to Virtual      Who are you speaking with? Patient   If it is not the patient, are they listed on an active communication consent form? N/A   Which provider is the appointment scheduled with? Danielle Medina PA-C   When is the appointment scheduled? Please list date and time  07/13/2023 @ 10:20am    At which location is the appointment scheduled to take place? [facility]   Was the appointment rescheduled or changed from an in person visit to a virtual visit? If so, please list the details of the change. Yes, 07/13/2023 @8:40AM    What is the reason for the appointment change? Error, incorrect time. Was [facility] scheduled for this visit? No   Does [facility] need to be scheduled for the new visit (if applicable) No   Does the patient need an infusion appointment rescheduled? No   Does the patient have an infusion appointment scheduled? If so, when? No   Is the patient undergoing chemotherapy? No   Was the no-show policy reviewed for appointments being changed with less then 24 hours of notice?  No

## 2023-07-13 ENCOUNTER — TELEPHONE (OUTPATIENT)
Dept: HEMATOLOGY ONCOLOGY | Facility: CLINIC | Age: 53
End: 2023-07-13

## 2023-07-13 ENCOUNTER — OFFICE VISIT (OUTPATIENT)
Dept: HEMATOLOGY ONCOLOGY | Facility: CLINIC | Age: 53
End: 2023-07-13
Payer: COMMERCIAL

## 2023-07-13 VITALS
BODY MASS INDEX: 25.53 KG/M2 | OXYGEN SATURATION: 98 % | SYSTOLIC BLOOD PRESSURE: 118 MMHG | WEIGHT: 188.5 LBS | HEIGHT: 72 IN | TEMPERATURE: 97.7 F | HEART RATE: 65 BPM | RESPIRATION RATE: 18 BRPM | DIASTOLIC BLOOD PRESSURE: 78 MMHG

## 2023-07-13 DIAGNOSIS — C82.38 GRADE 3A FOLLICULAR LYMPHOMA OF LYMPH NODES OF MULTIPLE REGIONS (HCC): Primary | ICD-10-CM

## 2023-07-13 PROCEDURE — 99215 OFFICE O/P EST HI 40 MIN: CPT | Performed by: PHYSICIAN ASSISTANT

## 2023-07-13 NOTE — PROGRESS NOTES
Hematology/Oncology Outpatient Follow- up Note  Jamie Melendrez 46 y.o. male MRN: @ Encounter: [id number]        Date:  7/13/2023      Assessment / Plan:      History of stage IV follicular lymphoma grade 3B, biopsy proven 5/2017 when he presented with weight loss, splenomegaly, constitutional symptoms, multiple lymph nodes above and below the diaphragm. Bone marrow biopsy 6/2017 showed 10% involvement with follicular lymphoma. He was treated with R-CHOP for total of 6 cycles spanning from May 2017 until September 2017. Subsequent PET scan showed no evidence of disease and no evidence of splenomegaly or lymphadenopathy     He received maintenance rituximab 375 milligram/meter squared every 2 months for total of 2 years finished in October 2019.     2. Relapse of disease by PET scan in September 2020 which showed innumerable bilateral cervical, axillary, mediastinal, retroperitoneal, bilateral iliac, inguinal lymph nodes, splenic involvement. Excisional biopsy of a right inguinal lymph node 9/22/20 showed relapsed follicular lymphoma, no evidence of transformation, positive for CD 19, CD 20, CD 10, Ki-67 about 60%      Significant progression of disease by PET scan in 7/2022, he elected for continued observation. Constitutional symptoms in December 2022. Developed shingles. 12/20/22 inguinal biopsy at [address] showed follicular lymphoma FE-17 about 10% positive for Bcl-2, BCL6, CD20, negative for CD5, CD10, cyclin D1     Evaluated at [address] multiple option were offered, including CAR-T, bispecifics, Bendamustine/anti-CD20     1/31/23 Initiated on obinutuzumab/Bendamustine. He received 3 cycles, PET scan 4/14/23 showed essentially resolved hypermetabolism in the neck chest abdomen and pelvis      Cycle 6 due 6/19/23. Met with Dr. Moon Current 6/28/23 - maintenance obinutuzumab versus observation discussed. He elects for observation.     Follow-up PET/CT around October 2023 has been requested per Dr. Marla Shell. Follow-up post PET/CT for evaluation     2. Postherpetic neuralgia. Patient states this is tolerable. HPI:    Jayne Gil is a 46 y.o. male with follicular lymphoma. He presented in 2016 with splenomegaly, weight loss, constitutional symptoms. Multiple enlarged lymph nodes noted above and below the diaphragm. Excisional biopsy 5/2017 of 1 of the retroperitoneal lymph node confirmed the diagnosis. Bone marrow biopsy 6/2/2017 showed 10 percent involvement with follicular lymphoma. No evidence of transformation, 2nd opinion at [address] [place] [address] Dr agreed on treatment with R-CHOP     He received 6 cycles of R-CHOP spanning from May 2017 until September 2017. Subsequent PET scan showed no evidence of disease and physical examination showed disappearance of splenomegaly and lymphadenopathy     Maintenance rituximab 375 milligram/meter squared every 2 months for total of 2 years finished in October 2019     PET scan in January 2020 showed uptake in the right inguinal area however patient did not have any constitutional symptoms. He had normal CBC, CMP, LDH we decided to watch and observe.  At that time physical examination showed 1.5 cm left inguinal lymph node     Repeat PET scan in March 2020 showed uptake with SUV between 3 and 5 in the mediastinum, right perihilar area, no evidence of uptake in the abdomen or pelvis specially no uptake in the left inguinal area     In June 2020 cough intermittent without sputum production especially in the midthoracic area, denies any constitutional symptoms, CBC, CMP, LDH are normal     Repeat PET scan on 09/03/2020 showed progression of disease with innumerable new glucose avid lymph nodes throughout the neck, chest, retroperitoneal, iliac, inguinal regions, splenic uptake, no evidence of bulky disease with score of 5, mild increase in the spleen size        Status post excision biopsy of the right inguinal lymph node on 09/22/2020  A. Right inguinal lymph node:  - Follicular lymphoma, follicular pattern, grade 3A (average of 17 centroblasts/HPF) - see Note. - Flow cytometry ([facility]#[id number], evaluated by Dr. Hyacinth Thomas) reveals:   * CD10+ B-cell lymphoma, comprising 70% of total cells analyzed, of small to medium size with following immunophenotype:    -- positive: CD19, CD20, CD10, CD38, sKappa    -- negative: sLambda, CD5, CD11c, CD23   * Viability 7AAD: 89%. * The following antigens were evaluated & found to be expressed as described above or by appropriate cells:  CD2, CD3, CD4, CD5, CD7, CD8, CD10, CD11c, CD19, CD20, CD23, CD38, CD45, CD56, CD57, FMC7, sKappa, sLambda. There was no evidence of transformation, he was kept on watchful observation. COVID-19 infection in December 2020 with full recovery     On 06/2022 he felt abdominal pressure after drinking beer, by physical examination he was found to have splenomegaly, PET scan showed multiple lymphadenopathy in the cervical, supra and infraclavicular, axillary, abdominal, mediastinal, inguinal area the largest in the retroperitoneal area up to 7 cm, spleen is enlarged as well     Repeat biopsy of the inguinal area at [address] showed low-grade follicular lymphoma WT-86 of 10% positive for CD20, PAX5, Bcl-2, BCL6, CD10, negative for CD5, CD23, cyclin D1        Constitutional symptoms in December 2022. Developed shingles. 12/20/22 inguinal biopsy at [address] showed follicular lymphoma HA-94 about 10% positive for Bcl-2, BCL6, CD20, negative for CD5, CD10, cyclin D1     Evaluated at [address] multiple option were offered, including CAR-T, bispecifics, Bendamustine/anti-CD20     1/31/23 Initiated on obinutuzumab/Bendamustine.  he received 3 cycles, PET scan 4/14/23 showed essentially resolved hypermetabolism in the neck chest abdomen and pelvis    Interval History    6/28/23 met with  Rommie Severin    -Discussed observation vs obinutuzumab maintenance, recommended observation-  Patient would like to move forward with observation  -He will have a PET scan in October, 6 months from last scan and follow up via telehealth          Review of Systems   Constitutional: Negative for appetite change, chills, diaphoresis, fatigue, fever and unexpected weight change. HENT:   Negative for mouth sores, nosebleeds, sore throat, tinnitus and voice change. Eyes: Negative for eye problems. Respiratory: Negative for chest tightness, cough, shortness of breath and wheezing. Cardiovascular: Negative for chest pain, leg swelling and palpitations. Gastrointestinal: Negative for abdominal distention, abdominal pain, blood in stool, constipation, diarrhea, nausea, rectal pain and vomiting. Endocrine: Negative for hot flashes. Genitourinary: Negative. Musculoskeletal: Negative for gait problem and myalgias. Skin: Negative for itching and rash. Neurological: Negative for dizziness, gait problem, headaches, light-headedness and numbness. Left flank PHN   Hematological: Negative for adenopathy. Psychiatric/Behavioral: Negative for confusion and sleep disturbance. The patient is not nervous/anxious. Test Results:        Labs:   Lab Results   Component Value Date    HGB 12.7 06/21/2023    HCT 37.4 06/21/2023    MCV 87 06/21/2023     (L) 06/21/2023    WBC 4.85 06/21/2023    NRBC 0 06/21/2023    ATYLMPCT 4 (H) 10/02/2017     Lab Results   Component Value Date     08/16/2017    K 3.7 06/21/2023     06/21/2023    CO2 26 06/21/2023    BUN 16 06/21/2023    CREATININE 0.81 06/21/2023    GLUF 86 02/02/2023    CALCIUM 8.9 06/21/2023    CORRECTEDCA 9.2 12/30/2020    AST 22 06/21/2023    ALT 22 06/21/2023    ALKPHOS 109 (H) 06/21/2023    PROT 6.3 08/16/2017    BILITOT 0.5 08/16/2017    EGFR 100 06/21/2023           Imaging: No results found.           Allergies: No Known Allergies  Current Medications: Reviewed  PMH/FH/SH:  Reviewed      Physical Exam:    2.08 meters squared    Ht Readings from Last 3 Encounters:   07/13/23 6' (1.829 m)   06/22/23 6' 0.99" (1.854 m)   06/21/23 6' 0.99" (1.854 m)        Wt Readings from Last 3 Encounters:   07/13/23 85.5 kg (188 lb 8 oz)   06/22/23 85.5 kg (188 lb 8 oz)   06/21/23 85.5 kg (188 lb 8 oz)        Temp Readings from Last 3 Encounters:   07/13/23 97.7 °F (36.5 °C) (Temporal)   06/22/23 97.5 °F (36.4 °C) (Temporal)   06/21/23 97.5 °F (36.4 °C) (Temporal)        BP Readings from Last 3 Encounters:   07/13/23 118/78   06/22/23 118/76   06/21/23 110/67             Physical Exam  Vitals reviewed. Constitutional:       General: He is not in acute distress. Appearance: He is well-developed. He is not diaphoretic. HENT:      Head: Normocephalic and atraumatic. Eyes:      Conjunctiva/sclera: Conjunctivae normal.   Neck:      Trachea: No tracheal deviation. Cardiovascular:      Rate and Rhythm: Normal rate and regular rhythm. Heart sounds: Normal heart sounds. No murmur heard. No friction rub. No gallop. Pulmonary:      Effort: Pulmonary effort is normal. No respiratory distress. Breath sounds: Normal breath sounds. No stridor. No wheezing, rhonchi or rales. Chest:      Chest wall: No tenderness. Abdominal:      General: There is no distension. Palpations: Abdomen is soft. Tenderness: There is no abdominal tenderness. Musculoskeletal:      Cervical back: Normal range of motion and neck supple. Lymphadenopathy:      Cervical: No cervical adenopathy. Upper Body:      Right upper body: No supraclavicular or axillary adenopathy. Left upper body: No supraclavicular or axillary adenopathy. Skin:     General: Skin is warm and dry. Coloration: Skin is not pale. Findings: No erythema. Neurological:      General: No focal deficit present.       Mental Status: He is alert and oriented to person, place, and time. Psychiatric:         Behavior: Behavior normal.         Thought Content: Thought content normal.         Judgment: Judgment normal.         ECOG:       Emergency Contacts:    Extended Emergency Contact Information  Primary Emergency Contact:  JoeArin lerner  Address: [address] [place] [address] of [address] [place] Phone: [phone]  Mobile Phone: [phone]  Relation: Spouse

## 2023-08-07 ENCOUNTER — OFFICE VISIT (OUTPATIENT)
Dept: INTERNAL MEDICINE CLINIC | Facility: CLINIC | Age: 53
End: 2023-08-07
Payer: COMMERCIAL

## 2023-08-07 VITALS
TEMPERATURE: 97.7 F | BODY MASS INDEX: 24.68 KG/M2 | SYSTOLIC BLOOD PRESSURE: 116 MMHG | HEIGHT: 72 IN | HEART RATE: 57 BPM | DIASTOLIC BLOOD PRESSURE: 72 MMHG | WEIGHT: 182.2 LBS | OXYGEN SATURATION: 98 %

## 2023-08-07 DIAGNOSIS — L23.7 ALLERGIC CONTACT DERMATITIS DUE TO PLANTS, EXCEPT FOOD: Primary | ICD-10-CM

## 2023-08-07 PROCEDURE — 99213 OFFICE O/P EST LOW 20 MIN: CPT | Performed by: INTERNAL MEDICINE

## 2023-08-07 RX ORDER — TRIAMCINOLONE ACETONIDE 1 MG/G
CREAM TOPICAL 2 TIMES DAILY
Qty: 30 G | Refills: 0 | Status: SHIPPED | OUTPATIENT
Start: 2023-08-07

## 2023-08-07 NOTE — PROGRESS NOTES
Assessment/Plan:           1. Allergic contact dermatitis due to plants, except food  -     triamcinolone (KENALOG) 0.1 % cream; Apply topically 2 (two) times a day           1. Allergic contact dermatitis due to plants, except food    - triamcinolone (KENALOG) 0.1 % cream; Apply topically 2 (two) times a day  Dispense: 30 g; Refill: 0       No problem-specific Assessment & Plan notes found for this encounter. Subjective:      Patient ID: Anuradha Coelho is a 46 y.o. male. HPI    The following portions of the patient's history were reviewed and updated as appropriate: He  has a past medical history of Lymph nodes enlarged, Lymphoma ([address]), and Spleen enlarged. He   Patient Active Problem List    Diagnosis Date Noted   • Port-A-Cath in place 03/23/2023   • Chemotherapy induced neutropenia ([address]) 01/18/2023   • Hypogammaglobulinemia ([address]) 01/18/2023   • Post herpetic neuralgia 12/05/2022   • Bleeding hemorrhoids 03/22/2021   • Exposure to SARS-associated coronavirus 12/10/2020   • Lymphadenopathy 09/10/2020   • Grade 3a follicular lymphoma of lymph nodes of multiple regions ([address]) 04/29/2019     He  has a past surgical history that includes Dental surgery; Colonoscopy; Nasal septum surgery; Portacath placement; Lymph node biopsy (Right, 9/22/2020); pr hemorrhoidopexy stapling (N/A, 4/27/2021); and pr hemorrhoidectomy int & xtrnl 2/> column/zahida (N/A, 4/27/2021). His family history is not on file. He  reports that he has never smoked. He has never used smokeless tobacco. He reports that he does not currently use alcohol after a past usage of about 2.0 standard drinks of alcohol per week. He reports that he does not use drugs.   Current Outpatient Medications   Medication Sig Dispense Refill   • ondansetron (ZOFRAN) 4 mg tablet Take 1 tablet (4 mg total) by mouth every 8 (eight) hours as needed for nausea or vomiting 20 tablet 0   • triamcinolone (KENALOG) 0.1 % cream Apply topically 2 (two) times a day 30 g 0 No current facility-administered medications for this visit. Current Outpatient Medications on File Prior to Visit   Medication Sig   • ondansetron (ZOFRAN) 4 mg tablet Take 1 tablet (4 mg total) by mouth every 8 (eight) hours as needed for nausea or vomiting     No current facility-administered medications on file prior to visit. There are no discontinued medications. He has No Known Allergies. .    Review of Systems      Objective:      /72 (BP Location: Left arm, Patient Position: Sitting, Cuff Size: Standard)   Pulse 57   Temp 97.7 °F (36.5 °C) (Temporal)   Ht 6' (1.829 m)   Wt 82.6 kg (182 lb 3.2 oz)   SpO2 98%   BMI 24.71 kg/m²     Results Reviewed     None          Recent Results (from the past 1344 hour(s))   CBC and differential    Collection Time: 06/21/23  8:18 AM   Result Value Ref Range    WBC 4.85 4.31 - 10.16 Thousand/uL    RBC 4.28 3.88 - 5.62 Million/uL    Hemoglobin 12.7 12.0 - 17.0 g/dL    Hematocrit 37.4 36.5 - 49.3 %    MCV 87 82 - 98 fL    MCH 29.7 26.8 - 34.3 pg    MCHC 34.0 31.4 - 37.4 g/dL    RDW 13.8 11.6 - 15.1 %    MPV 10.0 8.9 - 12.7 fL    Platelets 230 (L) 353 - 390 Thousands/uL    nRBC 0 /100 WBCs    Neutrophils Relative 63 43 - 75 %    Immat GRANS % 0 0 - 2 %    Lymphocytes Relative 10 (L) 14 - 44 %    Monocytes Relative 13 (H) 4 - 12 %    Eosinophils Relative 13 (H) 0 - 6 %    Basophils Relative 1 0 - 1 %    Neutrophils Absolute 3.03 1.85 - 7.62 Thousands/µL    Immature Grans Absolute 0.01 0.00 - 0.20 Thousand/uL    Lymphocytes Absolute 0.50 (L) 0.60 - 4.47 Thousands/µL    Monocytes Absolute 0.63 0.17 - 1.22 Thousand/µL    Eosinophils Absolute 0.61 0.00 - 0.61 Thousand/µL    Basophils Absolute 0.07 0.00 - 0.10 Thousands/µL   Comprehensive metabolic panel    Collection Time: 06/21/23  8:18 AM   Result Value Ref Range    Sodium 140 135 - 147 mmol/L    Potassium 3.7 3.5 - 5.3 mmol/L    Chloride 107 96 - 108 mmol/L    CO2 26 21 - 32 mmol/L    ANION GAP 7 mmol/L BUN 16 5 - 25 mg/dL    Creatinine 0.81 0.60 - 1.30 mg/dL    Glucose 107 65 - 140 mg/dL    Calcium 8.9 8.4 - 10.2 mg/dL    AST 22 13 - 39 U/L    ALT 22 7 - 52 U/L    Alkaline Phosphatase 109 (H) 34 - 104 U/L    Total Protein 6.1 (L) 6.4 - 8.4 g/dL    Albumin 4.1 3.5 - 5.0 g/dL    Total Bilirubin 1.03 (H) 0.20 - 1.00 mg/dL    eGFR 100 ml/min/1.73sq m        Physical Exam  HENT:      Head: Normocephalic. Eyes:      Pupils: Pupils are equal, round, and reactive to light. Cardiovascular:      Rate and Rhythm: Normal rate. Pulses: Normal pulses. Abdominal:      General: Abdomen is flat. Palpations: Abdomen is soft. Skin:     Findings: Rash present. Neurological:      General: No focal deficit present.

## 2023-08-08 ENCOUNTER — TELEPHONE (OUTPATIENT)
Dept: ADMINISTRATIVE | Facility: OTHER | Age: 53
End: 2023-08-08

## 2023-08-08 NOTE — TELEPHONE ENCOUNTER
----- Message from Isaac Knight sent at 8/7/2023  4:44 PM EDT -----  08/07/23 4:44 PM    Hello, our patient Stacia Barragan has had CRC: Colonoscopy completed/performed. Please assist in updating the patient chart by making an External outreach to [facility] located in [facility] The date of service is 2023.     Thank you,  Mitchell Surinder  [facility] INTERNAL [facility]

## 2023-08-08 NOTE — TELEPHONE ENCOUNTER
Upon review of the In Basket request and the patient's chart, initial outreach has been made via fax to facility. Please see Contacts section for details.      Thank you  Kevan Medina

## 2023-08-08 NOTE — LETTER
2nd Attempt    Procedure Request Form: Colonoscopy      Date Requested: 23  Patient: Oralia Dias  Patient : 1970   Referring Provider: No primary care provider on file. Date of Procedure ______________________________       The above patient has informed us that they have completed their   most recent Colonoscopy at your facility. Please complete   this form and attach all corresponding procedure reports/results. Comments __________________________________________________________  ____________________________________________________________________  ____________________________________________________________________  ____________________________________________________________________    Facility Completing Procedure _________________________________________    Form Completed By (print name) _______________________________________      Signature __________________________________________________________      These reports are needed for  compliance. Please fax this completed form and a copy of the procedure report to our office located at [address] [place] [address] as soon as possible to Fax [phone] attention Chester Gaxiola: Phone [phone]    We thank you for your assistance in treating our mutual patient.

## 2023-08-11 NOTE — TELEPHONE ENCOUNTER
As a follow-up, a second attempt has been made for outreach via fax to facility. Please see Contacts section for details.     Thank you  Terry Vail

## 2023-08-14 NOTE — TELEPHONE ENCOUNTER
Upon review of the In Basket request we were able to locate, review, and update the patient chart as requested for CRC: Colonoscopy. Any additional questions or concerns should be emailed to the Practice Liaisons via the appropriate education email address, please do not reply via In Basket. Thank you  Yong Vance         08/14/23 11:38 AM     [facility] [employer] SmartForm used to document caregap status.     Yong Vance

## 2023-08-30 ENCOUNTER — HOSPITAL ENCOUNTER (OUTPATIENT)
Dept: INFUSION CENTER | Facility: CLINIC | Age: 53
Discharge: HOME | End: 2023-08-30
Payer: COMMERCIAL

## 2023-08-30 DIAGNOSIS — C82.38 GRADE 3A FOLLICULAR LYMPHOMA OF LYMPH NODES OF MULTIPLE REGIONS (HCC): Primary | ICD-10-CM

## 2023-08-30 DIAGNOSIS — Z95.828 PORT-A-CATH IN PLACE: ICD-10-CM

## 2023-08-30 LAB
ALBUMIN SERPL BCP-MCNC: 4.4 G/DL (ref 3.5–5)
ALP SERPL-CCNC: 111 U/L (ref 34–104)
ALT SERPL W P-5'-P-CCNC: 22 U/L (ref 7–52)
ANION GAP SERPL CALCULATED.3IONS-SCNC: 7 MMOL/L
AST SERPL W P-5'-P-CCNC: 23 U/L (ref 13–39)
BASOPHILS # BLD MANUAL: 0.03 THOUSAND/UL (ref 0–0.1)
BASOPHILS NFR MAR MANUAL: 1 % (ref 0–1)
BILIRUB SERPL-MCNC: 1.16 MG/DL (ref 0.2–1)
BUN SERPL-MCNC: 11 MG/DL (ref 5–25)
CALCIUM SERPL-MCNC: 9 MG/DL (ref 8.4–10.2)
CHLORIDE SERPL-SCNC: 107 MMOL/L (ref 96–108)
CO2 SERPL-SCNC: 27 MMOL/L (ref 21–32)
CREAT SERPL-MCNC: 0.75 MG/DL (ref 0.6–1.3)
EOSINOPHIL # BLD MANUAL: 0.24 THOUSAND/UL (ref 0–0.4)
EOSINOPHIL NFR BLD MANUAL: 7 % (ref 0–6)
ERYTHROCYTE [DISTWIDTH] IN BLOOD BY AUTOMATED COUNT: 13.9 % (ref 11.6–15.1)
GFR SERPL CREATININE-BSD FRML MDRD: 103 ML/MIN/1.73SQ M
GLUCOSE SERPL-MCNC: 83 MG/DL (ref 65–140)
HCT VFR BLD AUTO: 39.4 % (ref 36.5–49.3)
HGB BLD-MCNC: 13.3 G/DL (ref 12–17)
LDH SERPL-CCNC: 202 U/L (ref 140–271)
LYMPHOCYTES # BLD AUTO: 1.22 THOUSAND/UL (ref 0.6–4.47)
LYMPHOCYTES # BLD AUTO: 24 % (ref 14–44)
MCH RBC QN AUTO: 29.2 PG (ref 26.8–34.3)
MCHC RBC AUTO-ENTMCNC: 33.8 G/DL (ref 31.4–37.4)
MCV RBC AUTO: 87 FL (ref 82–98)
MONOCYTES # BLD AUTO: 0.14 THOUSAND/UL (ref 0–1.22)
MONOCYTES NFR BLD: 4 % (ref 4–12)
NEUTROPHILS # BLD MANUAL: 1.85 THOUSAND/UL (ref 1.85–7.62)
NEUTS BAND NFR BLD MANUAL: 5 % (ref 0–8)
NEUTS SEG NFR BLD AUTO: 48 % (ref 43–75)
PLATELET # BLD AUTO: 128 THOUSANDS/UL (ref 149–390)
PLATELET BLD QL SMEAR: ADEQUATE
PMV BLD AUTO: 9.3 FL (ref 8.9–12.7)
POTASSIUM SERPL-SCNC: 4.1 MMOL/L (ref 3.5–5.3)
PROT SERPL-MCNC: 6.4 G/DL (ref 6.4–8.4)
RBC # BLD AUTO: 4.55 MILLION/UL (ref 3.88–5.62)
RBC MORPH BLD: NORMAL
SODIUM SERPL-SCNC: 141 MMOL/L (ref 135–147)
URATE SERPL-MCNC: 5.3 MG/DL (ref 3.5–8.5)
VARIANT LYMPHS # BLD AUTO: 11 %
WBC # BLD AUTO: 3.49 THOUSAND/UL (ref 4.31–10.16)

## 2023-08-30 PROCEDURE — 83615 LACTATE (LD) (LDH) ENZYME: CPT

## 2023-08-30 PROCEDURE — 85007 BL SMEAR W/DIFF WBC COUNT: CPT

## 2023-08-30 PROCEDURE — 84550 ASSAY OF BLOOD/URIC ACID: CPT

## 2023-08-30 PROCEDURE — 85027 COMPLETE CBC AUTOMATED: CPT

## 2023-08-30 PROCEDURE — 80053 COMPREHEN METABOLIC PANEL: CPT

## 2023-08-30 NOTE — PROGRESS NOTES
Patient arrives to infusion center for lab draw. R PAC accessed without issue, brisk blood return noted - labs collected and sent as per orders. Port flushed well without resistance. Deaccessed, bandaid in place. Patient states he has upcoming appt with Juan and will get flushed there - will contact later to schedule with us. Patient ambulatory upon DC.

## 2023-10-24 ENCOUNTER — TELEPHONE (OUTPATIENT)
Dept: HEMATOLOGY ONCOLOGY | Facility: CLINIC | Age: 53
End: 2023-10-24

## 2023-10-24 ENCOUNTER — HOSPITAL ENCOUNTER (OUTPATIENT)
Dept: RADIOLOGY | Age: 53
Discharge: HOME | End: 2023-10-24
Payer: COMMERCIAL

## 2023-10-24 DIAGNOSIS — C82.33 FOLLICULAR LYMPHOMA GRADE IIIA OF INTRA-ABDOMINAL LYMPH NODES (HCC): ICD-10-CM

## 2023-10-24 LAB — GLUCOSE SERPL-MCNC: 86 MG/DL (ref 65–140)

## 2023-10-24 PROCEDURE — 78815 PET IMAGE W/CT SKULL-THIGH: CPT

## 2023-10-24 PROCEDURE — G1004 CDSM NDSC: HCPCS

## 2023-10-24 PROCEDURE — 82948 REAGENT STRIP/BLOOD GLUCOSE: CPT

## 2023-10-24 PROCEDURE — A9552 F18 FDG: HCPCS

## 2023-10-24 NOTE — TELEPHONE ENCOUNTER
Patient Call    Who are you speaking with? [facility]'s radiology     If it is not the patient, are they listed on an active communication consent form? N/A   What is the reason for this call? Significant findings PET   Does this require a call back? N/A   If a call back is required, please list best call back number N/a   If a call back is required, advise that a message will be forwarded to their care team and someone will return their call as soon as possible. Did you relay this information to the patient?  N/A

## 2023-10-24 NOTE — TELEPHONE ENCOUNTER
PET was ordered by Carito Mcdermott from [facility]. Results have been sent to provider. Pt is actively following with [facility].

## 2023-10-24 NOTE — LETTER
[address] [place] [address],[facility]  [id number] [place] [address] [address] [address]      October 27, 2023    MRN: [id number]     Phone: [phone]     Dear Mr. Maria Alejandra Matute recently had a(n) Nuclear Medicine performed on 10/24/2023 at  [address] [place] [address] that was requested by Daria [facility] [place] Floridalma. The study was reviewed by a radiologist, which is a physician who specializes in medical imaging. The radiologist issued a report describing his or her findings. In that report there was a finding that the radiologist felt warranted further discussion with your health care provider and that discussion would be beneficial to you. The results were sent to [address] [place] [address] on 10/24/2023  9:02 AM. We recommend that you contact Daria [facility] [place] Floridalma at [phone] or set up an appointment to discuss the results of the imaging test. If you have already heard from D[facility] [place] Floridalma regarding the results of your study, you can disregard this letter. This letter is not meant to alarm you, but intended to encourage you to follow-up on your results with the provider that sent you for the imaging study. In addition, we have enclosed answers to frequently asked questions by other patients who have also received a letter to review results with their health care provider (see page two). Thank you for choosing [address] [place] [address] for your medical imaging needs. FREQUENTLY ASKED QUESTIONS    Why am I receiving this letter? [address] [place] [address] requires us to notify patients who have findings on imaging exams that may require more testing or follow-up with a health professional within the next 3 months.         How serious is the finding on the imaging test?  This letter is sent to all patients who may need follow-up or more testing within the next 3 months. Receiving this letter does not necessarily mean you have a life-threatening imaging finding or disease. Recommendations in the radiologist’s imaging report are general in nature and it is up to your healthcare provider to say whether those recommendations make sense for your situation. You are strongly encouraged to talk to your health care provider about the results and ask whether additional steps need to be taken. Where can I get a copy of the final report for my recent radiology exam?  To get a full copy of the report you can access your records online at [email or web address] or please contact [facility] at [phone] Monday through Friday between 8 am and 6 pm.         What do I need to do now? Please contact your health care provider who requested the imaging study to discuss what further actions (if any) are needed. You may have already heard from (your ordering provider) in regard to this test in which case you can disregard this letter. NOTICE IN ACCORDANCE WITH THE [place] “PATIENT TEST RESULT INFORMATION ACT OF 2018”    You are receiving this notice as a result of a determination by your diagnostic imaging service that further discussions of your test results are warranted and would be beneficial to you. The complete results of your test or tests have been or will be sent to the health care practitioner that ordered the test or tests. It is recommended that you contact your health care practitioner to discuss your results as soon as possible.

## 2023-10-27 ENCOUNTER — HOSPITAL ENCOUNTER (OUTPATIENT)
Dept: INFUSION CENTER | Facility: CLINIC | Age: 53
Discharge: HOME | End: 2023-10-27
Payer: COMMERCIAL

## 2023-10-27 DIAGNOSIS — C82.38 GRADE 3A FOLLICULAR LYMPHOMA OF LYMPH NODES OF MULTIPLE REGIONS (HCC): ICD-10-CM

## 2023-10-27 DIAGNOSIS — Z95.828 PORT-A-CATH IN PLACE: Primary | ICD-10-CM

## 2023-10-27 LAB
ALBUMIN SERPL BCP-MCNC: 4.5 G/DL (ref 3.5–5)
ALP SERPL-CCNC: 115 U/L (ref 34–104)
ALT SERPL W P-5'-P-CCNC: 25 U/L (ref 7–52)
ANION GAP SERPL CALCULATED.3IONS-SCNC: 5 MMOL/L
AST SERPL W P-5'-P-CCNC: 20 U/L (ref 13–39)
BASOPHILS # BLD AUTO: 0.06 THOUSANDS/ÂΜL (ref 0–0.1)
BASOPHILS NFR BLD AUTO: 1 % (ref 0–1)
BILIRUB SERPL-MCNC: 0.73 MG/DL (ref 0.2–1)
BUN SERPL-MCNC: 19 MG/DL (ref 5–25)
CALCIUM SERPL-MCNC: 9.1 MG/DL (ref 8.4–10.2)
CHLORIDE SERPL-SCNC: 107 MMOL/L (ref 96–108)
CO2 SERPL-SCNC: 27 MMOL/L (ref 21–32)
CREAT SERPL-MCNC: 0.82 MG/DL (ref 0.6–1.3)
EOSINOPHIL # BLD AUTO: 0.29 THOUSAND/ÂΜL (ref 0–0.61)
EOSINOPHIL NFR BLD AUTO: 5 % (ref 0–6)
ERYTHROCYTE [DISTWIDTH] IN BLOOD BY AUTOMATED COUNT: 14.2 % (ref 11.6–15.1)
GFR SERPL CREATININE-BSD FRML MDRD: 100 ML/MIN/1.73SQ M
GLUCOSE SERPL-MCNC: 86 MG/DL (ref 65–140)
HCT VFR BLD AUTO: 41.7 % (ref 36.5–49.3)
HGB BLD-MCNC: 14.5 G/DL (ref 12–17)
IMM GRANULOCYTES # BLD AUTO: 0.01 THOUSAND/UL (ref 0–0.2)
IMM GRANULOCYTES NFR BLD AUTO: 0 % (ref 0–2)
LDH SERPL-CCNC: 115 U/L (ref 140–271)
LYMPHOCYTES # BLD AUTO: 1.02 THOUSANDS/ÂΜL (ref 0.6–4.47)
LYMPHOCYTES NFR BLD AUTO: 17 % (ref 14–44)
MCH RBC QN AUTO: 29.4 PG (ref 26.8–34.3)
MCHC RBC AUTO-ENTMCNC: 34.8 G/DL (ref 31.4–37.4)
MCV RBC AUTO: 85 FL (ref 82–98)
MONOCYTES # BLD AUTO: 0.67 THOUSAND/ÂΜL (ref 0.17–1.22)
MONOCYTES NFR BLD AUTO: 11 % (ref 4–12)
NEUTROPHILS # BLD AUTO: 3.92 THOUSANDS/ÂΜL (ref 1.85–7.62)
NEUTS SEG NFR BLD AUTO: 66 % (ref 43–75)
NRBC BLD AUTO-RTO: 0 /100 WBCS
PLATELET # BLD AUTO: 136 THOUSANDS/UL (ref 149–390)
PMV BLD AUTO: 10 FL (ref 8.9–12.7)
POTASSIUM SERPL-SCNC: 4.2 MMOL/L (ref 3.5–5.3)
PROT SERPL-MCNC: 6.5 G/DL (ref 6.4–8.4)
RBC # BLD AUTO: 4.93 MILLION/UL (ref 3.88–5.62)
SODIUM SERPL-SCNC: 139 MMOL/L (ref 135–147)
WBC # BLD AUTO: 5.97 THOUSAND/UL (ref 4.31–10.16)

## 2023-10-27 PROCEDURE — 85025 COMPLETE CBC W/AUTO DIFF WBC: CPT

## 2023-10-27 PROCEDURE — 83615 LACTATE (LD) (LDH) ENZYME: CPT

## 2023-10-27 PROCEDURE — 80053 COMPREHEN METABOLIC PANEL: CPT

## 2023-10-27 NOTE — PROGRESS NOTES
Patient arrives for port flush and lab draw. Labs to be drawn today confirmed with patient and are in [facility]. R PAC accessed, brisk blood return noted, flushed without resistance. Labs drawn and sent per orders. R PAC de-accessed, bleeding controlled and bandaid applied. Noted that patient does not have any follow up appointments scheduled. Patient states he will be following up with oncologist at [facility] and aware that he should schedule with Clarice Ochoa. Patient declined AVS. Ambulatory from department.

## 2023-12-21 ENCOUNTER — HOSPITAL ENCOUNTER (OUTPATIENT)
Dept: INFUSION CENTER | Facility: CLINIC | Age: 53
Discharge: HOME | End: 2023-12-21
Payer: COMMERCIAL

## 2023-12-21 DIAGNOSIS — Z95.828 PORT-A-CATH IN PLACE: Primary | ICD-10-CM

## 2023-12-21 PROCEDURE — 96523 IRRIG DRUG DELIVERY DEVICE: CPT

## 2023-12-21 NOTE — PROGRESS NOTES
Patient arrives for port flush. R PAC accessed, brisk blood return, flushed without resistance. Port de-accessed, bandaid applied. Patient reports he does not have next appointment scheduled, but encouraged to schedule prior to leaving department today. Patient declined AVS, ambulatory from department.

## 2024-02-22 ENCOUNTER — HOSPITAL ENCOUNTER (OUTPATIENT)
Dept: INFUSION CENTER | Facility: CLINIC | Age: 54
Discharge: HOME | End: 2024-02-22

## 2024-02-26 ENCOUNTER — TELEPHONE (OUTPATIENT)
Dept: HEMATOLOGY ONCOLOGY | Facility: CLINIC | Age: 54
End: 2024-02-26

## 2024-02-26 DIAGNOSIS — C82.38 GRADE 3A FOLLICULAR LYMPHOMA OF LYMPH NODES OF MULTIPLE REGIONS (HCC): Primary | ICD-10-CM

## 2024-02-26 NOTE — TELEPHONE ENCOUNTER
Lab Inquiry   Who are you speaking with? Patient     If it is not the patient, are they listed on an active communication consent form? N/A   Name of ordering provider Dr. Patel   What is being requested? Lab orders need to be entered for appointment on 03/26/2024.   Lab draw location [facility]   What is the best call back number? [phone]   If patient at the lab, Was a live attempts to contact the team made? N/A

## 2024-02-26 NOTE — TELEPHONE ENCOUNTER
Appointment Schedule   Who are you speaking with? Patient   If it is not the patient, are they listed on an active communication consent form? N/A   Which provider is the appointment scheduled with? Dr. Patel   At which location is the appointment scheduled for? [facility]   When is the appointment scheduled?  Please list date and time 03/26/2024 @1:20PM    What is the reason for this appointment? Requested FU with labs   Did patient voice understanding of the details of this appointment? Yes   Was the no show policy reviewed with patient? Yes

## 2024-02-29 ENCOUNTER — HOSPITAL ENCOUNTER (OUTPATIENT)
Dept: INFUSION CENTER | Facility: CLINIC | Age: 54
Discharge: HOME | End: 2024-02-29
Payer: COMMERCIAL

## 2024-02-29 DIAGNOSIS — Z95.828 PORT-A-CATH IN PLACE: Primary | ICD-10-CM

## 2024-02-29 DIAGNOSIS — C82.38 GRADE 3A FOLLICULAR LYMPHOMA OF LYMPH NODES OF MULTIPLE REGIONS (HCC): ICD-10-CM

## 2024-02-29 LAB
ALBUMIN SERPL BCP-MCNC: 4.4 G/DL (ref 3.5–5)
ALP SERPL-CCNC: 90 U/L (ref 34–104)
ALT SERPL W P-5'-P-CCNC: 33 U/L (ref 7–52)
ANION GAP SERPL CALCULATED.3IONS-SCNC: 4 MMOL/L
AST SERPL W P-5'-P-CCNC: 19 U/L (ref 13–39)
BASOPHILS # BLD AUTO: 0.06 THOUSANDS/ÂΜL (ref 0–0.1)
BASOPHILS NFR BLD AUTO: 1 % (ref 0–1)
BILIRUB SERPL-MCNC: 0.86 MG/DL (ref 0.2–1)
BUN SERPL-MCNC: 19 MG/DL (ref 5–25)
CALCIUM SERPL-MCNC: 9.4 MG/DL (ref 8.4–10.2)
CHLORIDE SERPL-SCNC: 105 MMOL/L (ref 96–108)
CO2 SERPL-SCNC: 30 MMOL/L (ref 21–32)
CREAT SERPL-MCNC: 0.9 MG/DL (ref 0.6–1.3)
EOSINOPHIL # BLD AUTO: 0.27 THOUSAND/ÂΜL (ref 0–0.61)
EOSINOPHIL NFR BLD AUTO: 5 % (ref 0–6)
ERYTHROCYTE [DISTWIDTH] IN BLOOD BY AUTOMATED COUNT: 13.2 % (ref 11.6–15.1)
GFR SERPL CREATININE-BSD FRML MDRD: 95 ML/MIN/1.73SQ M
GLUCOSE SERPL-MCNC: 96 MG/DL (ref 65–140)
HCT VFR BLD AUTO: 43.1 % (ref 36.5–49.3)
HGB BLD-MCNC: 14.9 G/DL (ref 12–17)
IMM GRANULOCYTES # BLD AUTO: 0.03 THOUSAND/UL (ref 0–0.2)
IMM GRANULOCYTES NFR BLD AUTO: 1 % (ref 0–2)
LDH SERPL-CCNC: 128 U/L (ref 140–271)
LYMPHOCYTES # BLD AUTO: 0.95 THOUSANDS/ÂΜL (ref 0.6–4.47)
LYMPHOCYTES NFR BLD AUTO: 17 % (ref 14–44)
MCH RBC QN AUTO: 30 PG (ref 26.8–34.3)
MCHC RBC AUTO-ENTMCNC: 34.6 G/DL (ref 31.4–37.4)
MCV RBC AUTO: 87 FL (ref 82–98)
MONOCYTES # BLD AUTO: 0.58 THOUSAND/ÂΜL (ref 0.17–1.22)
MONOCYTES NFR BLD AUTO: 10 % (ref 4–12)
NEUTROPHILS # BLD AUTO: 3.79 THOUSANDS/ÂΜL (ref 1.85–7.62)
NEUTS SEG NFR BLD AUTO: 66 % (ref 43–75)
NRBC BLD AUTO-RTO: 0 /100 WBCS
PLATELET # BLD AUTO: 146 THOUSANDS/UL (ref 149–390)
PLATELET BLD QL SMEAR: ABNORMAL
PMV BLD AUTO: 10.2 FL (ref 8.9–12.7)
POTASSIUM SERPL-SCNC: 4.2 MMOL/L (ref 3.5–5.3)
PROT SERPL-MCNC: 6.5 G/DL (ref 6.4–8.4)
RBC # BLD AUTO: 4.97 MILLION/UL (ref 3.88–5.62)
RBC MORPH BLD: NORMAL
SODIUM SERPL-SCNC: 139 MMOL/L (ref 135–147)
WBC # BLD AUTO: 5.68 THOUSAND/UL (ref 4.31–10.16)

## 2024-02-29 PROCEDURE — 80053 COMPREHEN METABOLIC PANEL: CPT | Performed by: INTERNAL MEDICINE

## 2024-02-29 PROCEDURE — 83615 LACTATE (LD) (LDH) ENZYME: CPT | Performed by: INTERNAL MEDICINE

## 2024-02-29 PROCEDURE — 85025 COMPLETE CBC W/AUTO DIFF WBC: CPT | Performed by: INTERNAL MEDICINE

## 2024-02-29 NOTE — PROGRESS NOTES
Patient arrived for lab draw from port. Pt offered no complaints today.  Port accessed, brisk blood return noted. Labs drawn and sent. Port flushed and de accessed. Pt going to  upon leaving to make next appt for port flush.

## 2024-03-26 ENCOUNTER — OFFICE VISIT (OUTPATIENT)
Dept: HEMATOLOGY ONCOLOGY | Facility: CLINIC | Age: 54
End: 2024-03-26
Payer: COMMERCIAL

## 2024-03-26 VITALS
DIASTOLIC BLOOD PRESSURE: 76 MMHG | OXYGEN SATURATION: 99 % | TEMPERATURE: 98 F | WEIGHT: 205 LBS | HEIGHT: 72 IN | BODY MASS INDEX: 27.77 KG/M2 | RESPIRATION RATE: 17 BRPM | HEART RATE: 70 BPM | SYSTOLIC BLOOD PRESSURE: 110 MMHG

## 2024-03-26 DIAGNOSIS — C82.38 GRADE 3A FOLLICULAR LYMPHOMA OF LYMPH NODES OF MULTIPLE REGIONS (HCC): Primary | ICD-10-CM

## 2024-03-26 PROCEDURE — 99214 OFFICE O/P EST MOD 30 MIN: CPT | Performed by: INTERNAL MEDICINE

## 2024-03-26 NOTE — PROGRESS NOTES
Hematology Outpatient Follow - Up Note  Kevin Hudson 54 y.o. male MRN: @ Encounter: [id number]        Date:  3/26/2024        Assessment/ Plan:    History of stage IV follicular lymphoma grade 3B, biopsy proven 5/2017 when he presented with weight loss, splenomegaly, constitutional symptoms, multiple lymph nodes above and below the diaphragm. Bone marrow biopsy 6/2017 showed 10% involvement with follicular lymphoma.  He was treated with R-CHOP for total of 6 cycles spanning from May 2017 until September 2017. Subsequent PET scan showed no evidence of disease and no evidence of splenomegaly or lymphadenopathy     He received maintenance rituximab 375 milligram/meter squared every 2 months for total of 2 years finished in October 2019.     2. Relapse of disease by PET scan in September 2020 which showed innumerable bilateral cervical, axillary, mediastinal, retroperitoneal, bilateral iliac, inguinal lymph nodes, splenic involvement.     Excisional biopsy of a right inguinal lymph node 9/22/20 showed relapsed follicular lymphoma, no evidence of transformation, positive for CD 19, CD 20, CD 10, Ki-67 about 60%      Significant progression of disease by PET scan in 7/2022, he elected for continued observation.   Constitutional symptoms in December 2022. Developed shingles.     12/20/22 inguinal biopsy at [facility] showed follicular lymphoma Ki-67 about 10% positive for Bcl-2, BCL6, CD20, negative for CD5, CD10, cyclin D1     Evaluated at [facility] multiple option were offered, including CAR-T, bispecifics, Bendamustine/anti-CD20     1/31/23 Initiated on obinutuzumab/Bendamustine. He received 3 cycles, PET scan 4/14/23 showed essentially resolved hypermetabolism in the neck chest abdomen and pelvis      Cycle 6 due 6/19/23.   Met with Dr. Miranda 6/28/23 - maintenance obinutuzumab versus observation discussed.  He elects for observation.    No evidence of disease by subsequent imaging  studies as well as physical examination, normal LDH, CBC, CMP, will continue watchful observation every 6 months    Postherpetic neuralgia    Will check SARKIS for facial rash        Labs and imaging studies are reviewed by ordering provider once results are available. If there are findings that need immediate attention, you will be contacted when results available.   Discussing results and the implication on your healthcare is best discussed in person at your follow-up visit.       HPI:    Kevin Hudson is a 54 y.o. male with follicular lymphoma.  He presented in 2016 with splenomegaly, weight loss, constitutional symptoms.     Multiple enlarged lymph nodes noted above and below the diaphragm.   Excisional biopsy 5/2017 of 1 of the retroperitoneal lymph node confirmed the diagnosis.    Bone marrow biopsy 6/2/2017 showed 10 percent involvement with follicular lymphoma.     No evidence of transformation, 2nd opinion at [facility] agreed on treatment with R-CHOP     He received 6 cycles of R-CHOP spanning from May 2017 until September 2017.     Subsequent PET scan showed no evidence of disease and physical examination showed disappearance of splenomegaly and lymphadenopathy     Maintenance rituximab 375 milligram/meter squared every 2 months for total of 2 years finished in October 2019     PET scan in January 2020 showed uptake in the right inguinal area however patient did not have any constitutional symptoms. He had normal CBC, CMP, LDH we decided to watch and observe. At that time physical examination showed 1.5 cm left inguinal lymph node     Repeat PET scan in March 2020 showed uptake with SUV between 3 and 5 in the mediastinum, right perihilar area, no evidence of uptake in the abdomen or pelvis specially no uptake in the left inguinal area     In June 2020 cough intermittent without sputum production especially in the midthoracic area, denies any constitutional symptoms, CBC, CMP, LDH are normal      Repeat PET scan on 09/03/2020 showed progression of disease with innumerable new glucose avid lymph nodes throughout the neck, chest, retroperitoneal, iliac, inguinal regions, splenic uptake, no evidence of bulky disease with score of 5, mild increase in the spleen size        Status post excision biopsy of the right inguinal lymph node on 09/22/2020  A. Right inguinal lymph node:  - Follicular lymphoma, follicular pattern, grade 3A (average of 17 centroblasts/HPF) - see Note.   - Flow cytometry ([facility]#[id number], evaluated by Dr. JUAN JOSÉ Renee) reveals:   * CD10+ B-cell lymphoma, comprising 70% of total cells analyzed, of small to medium size with following immunophenotype:    -- positive: CD19, CD20, CD10, CD38, sKappa    -- negative: sLambda, CD5, CD11c, CD23   * Viability 7AAD: 89%.   * The following antigens were evaluated & found to be expressed as described above or by appropriate cells:  CD2, CD3, CD4, CD5, CD7, CD8, CD10, CD11c, CD19, CD20, CD23, CD38, CD45, CD56, CD57, FMC7, sKappa, sLambda.     There was no evidence of transformation, he was kept on watchful observation.     COVID-19 infection in December 2020 with full recovery     On 06/2022 he felt abdominal pressure after drinking beer, by physical examination he was found to have splenomegaly, PET scan showed multiple lymphadenopathy in the cervical, supra and infraclavicular, axillary, abdominal, mediastinal, inguinal area the largest in the retroperitoneal area up to 7 cm, spleen is enlarged as well     Repeat biopsy of the inguinal area at [facility] showed low-grade follicular lymphoma Ki-67 of 10% positive for CD20, PAX5, Bcl-2, BCL6, CD10, negative for CD5, CD23, cyclin D1        Constitutional symptoms in December 2022. Developed shingles.     12/20/22 inguinal biopsy at [facility] showed follicular lymphoma Ki-67 about 10% positive for Bcl-2, BCL6, CD20, negative for CD5, CD10, cyclin D1     Evaluated at  "[facility] multiple option were offered, including CAR-T, bispecifics, Bendamustine/anti-CD20     1/31/23 Initiated on obinutuzumab/Bendamustine. he received 3 cycles, PET scan 4/14/23 showed essentially resolved hypermetabolism in the neck chest abdomen and pelvis     Interval History:        Previous Treatment:         Test Results:    Imaging: No results found.    Labs:   Lab Results   Component Value Date    WBC 5.68 02/29/2024    HGB 14.9 02/29/2024    HCT 43.1 02/29/2024    MCV 87 02/29/2024     (L) 02/29/2024     Lab Results   Component Value Date     08/16/2017    K 4.2 02/29/2024     02/29/2024    CO2 30 02/29/2024    BUN 19 02/29/2024    CREATININE 0.90 02/29/2024    GLUF 86 02/02/2023    CALCIUM 9.4 02/29/2024    CORRECTEDCA 9.2 12/30/2020    AST 19 02/29/2024    ALT 33 02/29/2024    ALKPHOS 90 02/29/2024    PROT 6.3 08/16/2017    BILITOT 0.5 08/16/2017    EGFR 95 02/29/2024       Lab Results   Component Value Date    IRON 103 07/01/2019    FERRITIN 187 07/01/2019       No results found for: \"[id number]\"      ROS: Review of Systems   Constitutional: Negative.  Negative for appetite change, chills, diaphoresis, fatigue, fever and unexpected weight change.   HENT:   Negative for hearing loss, lump/mass, mouth sores, nosebleeds, sore throat, trouble swallowing and voice change.    Eyes: Negative.  Negative for eye problems and icterus.   Respiratory: Negative.  Negative for chest tightness, cough, hemoptysis and shortness of breath.    Cardiovascular:  Negative for chest pain and leg swelling.   Gastrointestinal:  Negative for abdominal distention, abdominal pain, blood in stool, constipation, diarrhea (Improved on lenalidomide dose reduction) and nausea.   Endocrine: Negative.    Genitourinary:  Negative for dysuria, frequency, hematuria and pelvic pain.    Musculoskeletal: Negative.  Negative for arthralgias, back pain, flank pain, gait problem, myalgias and neck " stiffness.   Skin:  Positive for rash. Negative for itching.   Neurological:  Negative for dizziness, gait problem, headaches, light-headedness, numbness and speech difficulty.   Hematological:  Negative for adenopathy. Does not bruise/bleed easily.   Psychiatric/Behavioral:  Negative for confusion, decreased concentration, depression and sleep disturbance. The patient is not nervous/anxious.           Current Medications: Reviewed  Allergies: Reviewed  PMH/FH/SH:  Reviewed      Physical Exam:    Body surface area is 2.15 meters squared.    Wt Readings from Last 3 Encounters:   03/26/24 93 kg (205 lb)   08/07/23 82.6 kg (182 lb 3.2 oz)   07/13/23 85.5 kg (188 lb 8 oz)        Temp Readings from Last 3 Encounters:   03/26/24 98 °F (36.7 °C) (Temporal)   08/07/23 97.7 °F (36.5 °C) (Temporal)   07/13/23 97.7 °F (36.5 °C) (Temporal)        BP Readings from Last 3 Encounters:   03/26/24 110/76   08/07/23 116/72   07/13/23 118/78         Pulse Readings from Last 3 Encounters:   03/26/24 70   08/07/23 57   07/13/23 65        Physical Exam  Vitals reviewed.   Constitutional:       General: He is not in acute distress.     Appearance: He is well-developed. He is not diaphoretic.   HENT:      Head: Normocephalic and atraumatic.   Eyes:      Conjunctiva/sclera: Conjunctivae normal.   Neck:      Trachea: No tracheal deviation.   Cardiovascular:      Rate and Rhythm: Normal rate and regular rhythm.      Heart sounds: No murmur heard.     No friction rub. No gallop.   Pulmonary:      Effort: Pulmonary effort is normal. No respiratory distress.      Breath sounds: Normal breath sounds. No wheezing or rales.   Chest:      Chest wall: No tenderness.   Abdominal:      General: There is no distension.      Palpations: Abdomen is soft.      Tenderness: There is no abdominal tenderness.   Musculoskeletal:      Cervical back: Normal range of motion and neck supple.      Right lower leg: No edema.      Left lower leg: No edema.    Lymphadenopathy:      Cervical: No cervical adenopathy.   Skin:     General: Skin is warm and dry.      Coloration: Skin is not pale.      Findings: No erythema.   Neurological:      Mental Status: He is alert and oriented to person, place, and time.   Psychiatric:         Behavior: Behavior normal.         Thought Content: Thought content normal.         Judgment: Judgment normal.         ECO    Goals and Barriers:  Current Goal: Minimize effects of disease.   Barriers: None.      Patient's Capacity to Self Care:  Patient is able to self care.    Code Status: [facility]@

## 2024-04-25 ENCOUNTER — HOSPITAL ENCOUNTER (OUTPATIENT)
Dept: INFUSION CENTER | Facility: CLINIC | Age: 54
Discharge: HOME | End: 2024-04-25
Payer: COMMERCIAL

## 2024-04-25 DIAGNOSIS — Z95.828 PORT-A-CATH IN PLACE: Primary | ICD-10-CM

## 2024-04-25 DIAGNOSIS — C82.38 GRADE 3A FOLLICULAR LYMPHOMA OF LYMPH NODES OF MULTIPLE REGIONS (HCC): ICD-10-CM

## 2024-04-25 LAB
BASOPHILS # BLD AUTO: 0.05 THOUSANDS/ÂΜL (ref 0–0.1)
BASOPHILS NFR BLD AUTO: 2 % (ref 0–1)
EOSINOPHIL # BLD AUTO: 0.19 THOUSAND/ÂΜL (ref 0–0.61)
EOSINOPHIL NFR BLD AUTO: 7 % (ref 0–6)
ERYTHROCYTE [DISTWIDTH] IN BLOOD BY AUTOMATED COUNT: 13.1 % (ref 11.6–15.1)
HCT VFR BLD AUTO: 41.6 % (ref 36.5–49.3)
HGB BLD-MCNC: 14.3 G/DL (ref 12–17)
IMM GRANULOCYTES # BLD AUTO: 0.02 THOUSAND/UL (ref 0–0.2)
IMM GRANULOCYTES NFR BLD AUTO: 1 % (ref 0–2)
LDH SERPL-CCNC: 215 U/L (ref 140–271)
LYMPHOCYTES # BLD AUTO: 0.69 THOUSANDS/ÂΜL (ref 0.6–4.47)
LYMPHOCYTES NFR BLD AUTO: 24 % (ref 14–44)
MCH RBC QN AUTO: 29.5 PG (ref 26.8–34.3)
MCHC RBC AUTO-ENTMCNC: 34.4 G/DL (ref 31.4–37.4)
MCV RBC AUTO: 86 FL (ref 82–98)
MONOCYTES # BLD AUTO: 0.53 THOUSAND/ÂΜL (ref 0.17–1.22)
MONOCYTES NFR BLD AUTO: 19 % (ref 4–12)
NEUTROPHILS # BLD AUTO: 1.37 THOUSANDS/ÂΜL (ref 1.85–7.62)
NEUTS SEG NFR BLD AUTO: 47 % (ref 43–75)
NRBC BLD AUTO-RTO: 0 /100 WBCS
PLATELET # BLD AUTO: 132 THOUSANDS/UL (ref 149–390)
PMV BLD AUTO: 10 FL (ref 8.9–12.7)
RBC # BLD AUTO: 4.85 MILLION/UL (ref 3.88–5.62)
WBC # BLD AUTO: 2.85 THOUSAND/UL (ref 4.31–10.16)

## 2024-04-25 PROCEDURE — 83615 LACTATE (LD) (LDH) ENZYME: CPT

## 2024-04-25 PROCEDURE — 85025 COMPLETE CBC W/AUTO DIFF WBC: CPT

## 2024-04-25 NOTE — PROGRESS NOTES
Patient arrived for lab draw from port. Pt offered no complaints today.  Port accessed, brisk blood return noted. Labs drawn and sent. Port flushed and de accessed. Next appt 6/20 at 730a

## 2024-06-20 ENCOUNTER — HOSPITAL ENCOUNTER (OUTPATIENT)
Dept: INFUSION CENTER | Facility: CLINIC | Age: 54
Discharge: HOME | End: 2024-06-20
Payer: COMMERCIAL

## 2024-06-20 DIAGNOSIS — Z95.828 PORT-A-CATH IN PLACE: Primary | ICD-10-CM

## 2024-06-20 DIAGNOSIS — C82.38 GRADE 3A FOLLICULAR LYMPHOMA OF LYMPH NODES OF MULTIPLE REGIONS (HCC): ICD-10-CM

## 2024-06-20 LAB
BASOPHILS # BLD AUTO: 0.07 THOUSANDS/ÂΜL (ref 0–0.1)
BASOPHILS NFR BLD AUTO: 1 % (ref 0–1)
EOSINOPHIL # BLD AUTO: 0.46 THOUSAND/ÂΜL (ref 0–0.61)
EOSINOPHIL NFR BLD AUTO: 8 % (ref 0–6)
ERYTHROCYTE [DISTWIDTH] IN BLOOD BY AUTOMATED COUNT: 13.7 % (ref 11.6–15.1)
HCT VFR BLD AUTO: 41.7 % (ref 36.5–49.3)
HGB BLD-MCNC: 14.2 G/DL (ref 12–17)
IMM GRANULOCYTES # BLD AUTO: 0.02 THOUSAND/UL (ref 0–0.2)
IMM GRANULOCYTES NFR BLD AUTO: 0 % (ref 0–2)
LDH SERPL-CCNC: 123 U/L (ref 140–271)
LYMPHOCYTES # BLD AUTO: 0.83 THOUSANDS/ÂΜL (ref 0.6–4.47)
LYMPHOCYTES NFR BLD AUTO: 14 % (ref 14–44)
MCH RBC QN AUTO: 28.9 PG (ref 26.8–34.3)
MCHC RBC AUTO-ENTMCNC: 34.1 G/DL (ref 31.4–37.4)
MCV RBC AUTO: 85 FL (ref 82–98)
MONOCYTES # BLD AUTO: 0.47 THOUSAND/ÂΜL (ref 0.17–1.22)
MONOCYTES NFR BLD AUTO: 8 % (ref 4–12)
NEUTROPHILS # BLD AUTO: 4.07 THOUSANDS/ÂΜL (ref 1.85–7.62)
NEUTS SEG NFR BLD AUTO: 69 % (ref 43–75)
NRBC BLD AUTO-RTO: 0 /100 WBCS
PLATELET # BLD AUTO: 131 THOUSANDS/UL (ref 149–390)
PLATELET BLD QL SMEAR: ADEQUATE
PMV BLD AUTO: 10.2 FL (ref 8.9–12.7)
RBC # BLD AUTO: 4.92 MILLION/UL (ref 3.88–5.62)
RBC MORPH BLD: NORMAL
WBC # BLD AUTO: 5.92 THOUSAND/UL (ref 4.31–10.16)

## 2024-06-20 PROCEDURE — 83615 LACTATE (LD) (LDH) ENZYME: CPT

## 2024-06-20 PROCEDURE — 85025 COMPLETE CBC W/AUTO DIFF WBC: CPT

## 2024-06-20 NOTE — PROGRESS NOTES
Patient to infusion for labs.  He offers no complaints.  Port accessed, labs drawn per order. Next appointment 8/22 0830 he declined AVS

## 2024-08-22 ENCOUNTER — HOSPITAL ENCOUNTER (OUTPATIENT)
Dept: INFUSION CENTER | Facility: CLINIC | Age: 54
End: 2024-08-22

## 2024-08-26 ENCOUNTER — HOSPITAL ENCOUNTER (OUTPATIENT)
Dept: INFUSION CENTER | Facility: CLINIC | Age: 54
Discharge: HOME | End: 2024-08-26
Payer: COMMERCIAL

## 2024-08-26 DIAGNOSIS — Z95.828 PORT-A-CATH IN PLACE: ICD-10-CM

## 2024-08-26 DIAGNOSIS — C82.38 GRADE 3A FOLLICULAR LYMPHOMA OF LYMPH NODES OF MULTIPLE REGIONS (HCC): Primary | ICD-10-CM

## 2024-08-26 LAB
BASOPHILS # BLD AUTO: 0.04 THOUSANDS/ÂΜL (ref 0–0.1)
BASOPHILS NFR BLD AUTO: 1 % (ref 0–1)
EOSINOPHIL # BLD AUTO: 0.4 THOUSAND/ÂΜL (ref 0–0.61)
EOSINOPHIL NFR BLD AUTO: 12 % (ref 0–6)
ERYTHROCYTE [DISTWIDTH] IN BLOOD BY AUTOMATED COUNT: 14.2 % (ref 11.6–15.1)
HCT VFR BLD AUTO: 39.4 % (ref 36.5–49.3)
HGB BLD-MCNC: 13.3 G/DL (ref 12–17)
IMM GRANULOCYTES # BLD AUTO: 0.01 THOUSAND/UL (ref 0–0.2)
IMM GRANULOCYTES NFR BLD AUTO: 0 % (ref 0–2)
LDH SERPL-CCNC: 181 U/L (ref 140–271)
LYMPHOCYTES # BLD AUTO: 0.92 THOUSANDS/ÂΜL (ref 0.6–4.47)
LYMPHOCYTES NFR BLD AUTO: 28 % (ref 14–44)
MCH RBC QN AUTO: 29.4 PG (ref 26.8–34.3)
MCHC RBC AUTO-ENTMCNC: 33.8 G/DL (ref 31.4–37.4)
MCV RBC AUTO: 87 FL (ref 82–98)
MONOCYTES # BLD AUTO: 0.59 THOUSAND/ÂΜL (ref 0.17–1.22)
MONOCYTES NFR BLD AUTO: 18 % (ref 4–12)
NEUTROPHILS # BLD AUTO: 1.31 THOUSANDS/ÂΜL (ref 1.85–7.62)
NEUTS SEG NFR BLD AUTO: 41 % (ref 43–75)
NRBC BLD AUTO-RTO: 0 /100 WBCS
PLATELET # BLD AUTO: 136 THOUSANDS/UL (ref 149–390)
PMV BLD AUTO: 10.3 FL (ref 8.9–12.7)
RBC # BLD AUTO: 4.53 MILLION/UL (ref 3.88–5.62)
WBC # BLD AUTO: 3.27 THOUSAND/UL (ref 4.31–10.16)

## 2024-08-26 PROCEDURE — 83615 LACTATE (LD) (LDH) ENZYME: CPT

## 2024-08-26 PROCEDURE — 85025 COMPLETE CBC W/AUTO DIFF WBC: CPT

## 2024-08-26 NOTE — PROGRESS NOTES
Patient here for lab work. Port flushed, blood return noted, labs drawn per order. Patient aware to schedule next lab draw, declined AVS.

## 2024-10-02 ENCOUNTER — APPOINTMENT (OUTPATIENT)
Dept: RADIOLOGY | Age: 54
End: 2024-10-02
Payer: COMMERCIAL

## 2024-10-02 ENCOUNTER — OFFICE VISIT (OUTPATIENT)
Dept: INTERNAL MEDICINE CLINIC | Facility: CLINIC | Age: 54
End: 2024-10-02
Payer: COMMERCIAL

## 2024-10-02 VITALS
OXYGEN SATURATION: 98 % | DIASTOLIC BLOOD PRESSURE: 72 MMHG | TEMPERATURE: 98.2 F | SYSTOLIC BLOOD PRESSURE: 122 MMHG | WEIGHT: 195 LBS | HEART RATE: 72 BPM | HEIGHT: 72 IN | BODY MASS INDEX: 26.41 KG/M2

## 2024-10-02 DIAGNOSIS — E78.2 MIXED HYPERLIPIDEMIA: ICD-10-CM

## 2024-10-02 DIAGNOSIS — Z00.00 ANNUAL PHYSICAL EXAM: ICD-10-CM

## 2024-10-02 DIAGNOSIS — R05.3 CHRONIC COUGH: ICD-10-CM

## 2024-10-02 DIAGNOSIS — R73.01 IMPAIRED FASTING BLOOD SUGAR: ICD-10-CM

## 2024-10-02 DIAGNOSIS — J04.10 TRACHEITIS: Primary | ICD-10-CM

## 2024-10-02 PROCEDURE — 99213 OFFICE O/P EST LOW 20 MIN: CPT | Performed by: INTERNAL MEDICINE

## 2024-10-02 PROCEDURE — 71046 X-RAY EXAM CHEST 2 VIEWS: CPT

## 2024-10-02 PROCEDURE — 99396 PREV VISIT EST AGE 40-64: CPT | Performed by: INTERNAL MEDICINE

## 2024-10-02 RX ORDER — PREDNISONE 50 MG/1
50 TABLET ORAL DAILY
Qty: 7 TABLET | Refills: 0 | Status: SHIPPED | OUTPATIENT
Start: 2024-10-02 | End: 2024-10-09

## 2024-10-02 RX ORDER — AZITHROMYCIN 250 MG/1
TABLET, FILM COATED ORAL
Qty: 6 TABLET | Refills: 0 | Status: SHIPPED | OUTPATIENT
Start: 2024-10-02 | End: 2024-10-07

## 2024-10-02 NOTE — PROGRESS NOTES
Adult Annual Physical  Name: Kevin Hudson      : 1970      MRN: [id number]  Encounter Provider: Keith Pavon MD  Encounter Date: 10/2/2024   Encounter department: [facility] INTERNAL MEDICINE    Assessment & Plan  Tracheitis    Orders:    azithromycin (Zithromax) 250 mg tablet; Take 2 tablets (500 mg total) by mouth daily for 1 day, THEN 1 tablet (250 mg total) daily for 4 days.    predniSONE 50 mg tablet; Take 1 tablet (50 mg total) by mouth daily for 7 days    Annual physical exam    Orders:    CBC and differential; Future    Comprehensive metabolic panel; Future    Lipid Panel with Direct LDL reflex; Future    TSH, 3rd generation; Future    Hemoglobin A1C; Future    Chronic cough    Orders:    XR chest pa and lateral; Future    CBC and differential; Future    Mixed hyperlipidemia    Orders:    Comprehensive metabolic panel; Future    Lipid Panel with Direct LDL reflex; Future    TSH, 3rd generation; Future    Impaired fasting blood sugar    Orders:    CBC and differential; Future    Comprehensive metabolic panel; Future    Lipid Panel with Direct LDL reflex; Future    Hemoglobin A1C; Future    Immunizations and preventive care screenings were discussed with patient today. Appropriate education was printed on patient's after visit summary.    Discussed risks and benefits of prostate cancer screening. We discussed the controversial history of PSA screening for prostate cancer in the [place] as well as the risk of over detection and over treatment of prostate cancer by way of PSA screening.  The patient understands that PSA blood testing is an imperfect way to screen for prostate cancer and that elevated PSA levels in the blood may also be caused by infection, inflammation, prostatic trauma or manipulation, urological procedures, or by benign prostatic enlargement.    The role of the digital rectal examination in prostate cancer screening was also discussed and I discussed with him that  there is large interobserver variability in the findings of digital rectal examination.    Counseling:  Exercise: the importance of regular exercise/physical activity was discussed. Recommend exercise 3-5 times per week for at least 30 minutes.          History of Present Illness     Adult Annual Physical:  Patient presents for annual physical. physical.     Diet and Physical Activity:  - Diet/Nutrition: well balanced diet.  - Exercise: moderate cardiovascular exercise.    Depression Screening:  - PHQ-2 Score: 0    General Health:  - Sleep: sleeps well.  - Hearing: normal hearing bilateral ears.  - Vision: no vision problems.  - Dental: regular dental visits.     Health:  - History of STDs: no.   - Urinary symptoms: none.     Advanced Care Planning:  - Has an advanced directive?: no    - Has a durable medical POA?: yes    - ACP document given to patient?: yes      Review of Systems   Constitutional:  Positive for chills. Negative for fever.   HENT:  Negative for congestion, ear pain and sore throat.    Eyes:  Negative for pain.   Respiratory:  Positive for cough. Negative for shortness of breath.    Cardiovascular:  Negative for chest pain and leg swelling.   Gastrointestinal:  Negative for abdominal pain, nausea and vomiting.   Endocrine: Negative for polyuria.   Genitourinary:  Negative for difficulty urinating, frequency and urgency.   Musculoskeletal:  Negative for arthralgias and back pain.   Skin:  Negative for rash.   Neurological:  Negative for weakness and headaches.   Psychiatric/Behavioral:  Negative for sleep disturbance. The patient is not nervous/anxious.      Past Medical History   Past Medical History:   Diagnosis Date    Lymph nodes enlarged     CT bx-retropertineum today 5/18/2017    Lymphoma (HCC)     follicular    Spleen enlarged     CT bx today retropertineum-5/18/2017     Past Surgical History:   Procedure Laterality Date    COLONOSCOPY      DENTAL SURGERY      LYMPH NODE BIOPSY Right  9/22/2020    Procedure: EXCISION BIOPSY LYMPH NODE INGUINAL, lymphoma protocol;  Surgeon: Espinoza Wilkinson MD;  Location: [facility];  Service: Surgical Oncology    NASAL SEPTUM SURGERY      PORTACATH PLACEMENT      [place] HEMORRHOIDECTOMY INT & XTRNL 2/> COLUMN/LIZETTE N/A 4/27/2021    Procedure: HEMORRHOIDECTOMY EXCISION;  Surgeon: GLENYS Campbell MD;  Location: [facility];  Service: Colorectal    [place] HEMORRHOIDOPEXY STAPLING N/A 4/27/2021    Procedure: HEMORRHOIDALPEXY (THD);  Surgeon: GLENYS Campbell MD;  Location: [facility];  Service: Colorectal     History reviewed. No pertinent family history.  Current Outpatient Medications on File Prior to Visit   Medication Sig Dispense Refill    ondansetron (ZOFRAN) 4 mg tablet Take 1 tablet (4 mg total) by mouth every 8 (eight) hours as needed for nausea or vomiting (Patient not taking: Reported on 10/2/2024) 20 tablet 0    triamcinolone (KENALOG) 0.1 % cream Apply topically 2 (two) times a day (Patient not taking: Reported on 10/2/2024) 30 g 0     No current facility-administered medications on file prior to visit.   No Known Allergies   Current Outpatient Medications on File Prior to Visit   Medication Sig Dispense Refill    ondansetron (ZOFRAN) 4 mg tablet Take 1 tablet (4 mg total) by mouth every 8 (eight) hours as needed for nausea or vomiting (Patient not taking: Reported on 10/2/2024) 20 tablet 0    triamcinolone (KENALOG) 0.1 % cream Apply topically 2 (two) times a day (Patient not taking: Reported on 10/2/2024) 30 g 0     No current facility-administered medications on file prior to visit.      Social History     Tobacco Use    Smoking status: Never     Passive exposure: Never    Smokeless tobacco: Never   Vaping Use    Vaping status: Never Used   Substance and Sexual Activity    Alcohol use: Not Currently     Alcohol/week: 2.0 standard drinks of alcohol     Types: 2 Glasses of wine per week     Comment: monthly    Drug use: No    Sexual activity: Not on file        Objective     /68 (BP Location: Left arm, Patient Position: Sitting, Cuff Size: Standard)   Pulse 72   Temp 98.2 °F (36.8 °C) (Temporal)   Ht 6' (1.829 m)   Wt 88.5 kg (195 lb)   SpO2 98%   BMI 26.45 kg/m²     Physical Exam  Vitals and nursing note reviewed.   Constitutional:       General: He is not in acute distress.     Appearance: He is well-developed.   HENT:      Head: Normocephalic and atraumatic.   Eyes:      Conjunctiva/sclera: Conjunctivae normal.   Cardiovascular:      Rate and Rhythm: Normal rate and regular rhythm.      Heart sounds: No murmur heard.  Pulmonary:      Effort: Pulmonary effort is normal. No respiratory distress.      Breath sounds: Normal breath sounds.   Abdominal:      Palpations: Abdomen is soft.      Tenderness: There is no abdominal tenderness.   Musculoskeletal:         General: No swelling.      Cervical back: Neck supple.   Skin:     General: Skin is warm and dry.      Capillary Refill: Capillary refill takes less than 2 seconds.   Neurological:      Mental Status: He is alert.   Psychiatric:         Mood and Affect: Mood normal.

## 2024-10-02 NOTE — PATIENT INSTRUCTIONS
"Patient Education     Routine physical for adults   The Basics   Written by the doctors and editors at [facility]   What is a physical? -- A physical is a routine visit, or \"check-up,\" with your doctor. You might also hear it called a \"wellness visit\" or \"preventive visit.\"  During each visit, the doctor will:   Ask about your physical and mental health   Ask about your habits, behaviors, and lifestyle   Do an exam   Give you vaccines if needed   Talk to you about any medicines you take   Give advice about your health   Answer your questions  Getting regular check-ups is an important part of taking care of your health. It can help your doctor find and treat any problems you have. But it's also important for preventing health problems.  A routine physical is different from a \"sick visit.\" A sick visit is when you see a doctor because of a health concern or problem. Since physicals are scheduled ahead of time, you can think about what you want to ask the doctor.  How often should I get a physical? -- It depends on your age and health. In general, for people age 21 years and older:   If you are younger than 50 years, you might be able to get a physical every 3 years.   If you are 50 years or older, your doctor might recommend a physical every year.  If you have an ongoing health condition, like diabetes or high blood pressure, your doctor will probably want to see you more often.  What happens during a physical? -- In general, each visit will include:   Physical exam - The doctor or nurse will check your height, weight, heart rate, and blood pressure. They will also look at your eyes and ears. They will ask about how you are feeling and whether you have any symptoms that bother you.   Medicines - It's a good idea to bring a list of all the medicines you take to each doctor visit. Your doctor will talk to you about your medicines and answer any questions. Tell them if you are having any side effects that bother you. You " "should also tell them if you are having trouble paying for any of your medicines.   Habits and behaviors - This includes:   Your diet   Your exercise habits   Whether you smoke, drink alcohol, or use drugs   Whether you are sexually active   Whether you feel safe at home  Your doctor will talk to you about things you can do to improve your health and lower your risk of health problems. They will also offer help and support. For example, if you want to quit smoking, they can give you advice and might prescribe medicines. If you want to improve your diet or get more physical activity, they can help you with this, too.   Lab tests, if needed - The tests you get will depend on your age and situation. For example, your doctor might want to check your:   Cholesterol   Blood sugar   Iron level   Vaccines - The recommended vaccines will depend on your age, health, and what vaccines you already had. Vaccines are very important because they can prevent certain serious or deadly infections.   Discussion of screening - \"Screening\" means checking for diseases or other health problems before they cause symptoms. Your doctor can recommend screening based on your age, risk, and preferences. This might include tests to check for:   Cancer, such as breast, prostate, cervical, ovarian, colorectal, prostate, lung, or skin cancer   Sexually transmitted infections, such as chlamydia and gonorrhea   Mental health conditions like depression and anxiety  Your doctor will talk to you about the different types of screening tests. They can help you decide which screenings to have. They can also explain what the results might mean.   Answering questions - The physical is a good time to ask the doctor or nurse questions about your health. If needed, they can refer you to other doctors or specialists, too.  Adults older than 65 years often need other care, too. As you get older, your doctor will talk to you about:   How to prevent falling at " home   Hearing or vision tests   Memory testing   How to take your medicines safely   Making sure that you have the help and support you need at home  All topics are updated as new evidence becomes available and our peer review process is complete.  This topic retrieved from [employer] on: May 02, 2024.  Topic 767740 Version 1.0  Release: 32.4.3 - C32.122  © 2024 UpToDate, Inc. and/or its affiliates. All rights reserved.  Consumer Information Use and Disclaimer   Disclaimer: This generalized information is a limited summary of diagnosis, treatment, and/or medication information. It is not meant to be comprehensive and should be used as a tool to help the user understand and/or assess potential diagnostic and treatment options. It does NOT include all information about conditions, treatments, medications, side effects, or risks that may apply to a specific patient. It is not intended to be medical advice or a substitute for the medical advice, diagnosis, or treatment of a health care provider based on the health care provider's examination and assessment of a patient's specific and unique circumstances. Patients must speak with a health care provider for complete information about their health, medical questions, and treatment options, including any risks or benefits regarding use of medications. This information does not endorse any treatments or medications as safe, effective, or approved for treating a specific patient. UpToDate, Inc. and its affiliates disclaim any warranty or liability relating to this information or the use thereof.The use of this information is governed by the Terms of Use, available at https://www.woltersUserminduwer.com/en/know/clinical-effectiveness-terms. 2024© UpToDate, Inc. and its affiliates and/or licensors. All rights reserved.  Copyright   © 2024 UpToDate, Inc. and/or its affiliates. All rights reserved.

## 2024-10-02 NOTE — PROGRESS NOTES
Assessment/Plan:      Depression Screening and Follow-up Plan: Patient was screened for depression during today's encounter. They screened negative with a PHQ-2 score of 0.            1. Tracheitis  -     azithromycin (Zithromax) 250 mg tablet; Take 2 tablets (500 mg total) by mouth daily for 1 day, THEN 1 tablet (250 mg total) daily for 4 days.  -     predniSONE 50 mg tablet; Take 1 tablet (50 mg total) by mouth daily for 7 days  2. Annual physical exam  -     CBC and differential; Future  -     Comprehensive metabolic panel; Future  -     Lipid Panel with Direct LDL reflex; Future  -     TSH, 3rd generation; Future  -     Hemoglobin A1C; Future  3. Chronic cough  -     XR chest pa and lateral; Future; Expected date: 10/02/2024  -     CBC and differential; Future  4. Mixed hyperlipidemia  -     Comprehensive metabolic panel; Future  -     Lipid Panel with Direct LDL reflex; Future  -     TSH, 3rd generation; Future  5. Impaired fasting blood sugar  -     CBC and differential; Future  -     Comprehensive metabolic panel; Future  -     Lipid Panel with Direct LDL reflex; Future  -     Hemoglobin A1C; Future         Subjective:      Patient ID: Kevin Hudson is a 54 y.o. male.    Cough, clear sputum, going on for more than 2 weeks, chills occasionally, also physical        The following portions of the patient's history were reviewed and updated as appropriate: He  has a past medical history of Lymph nodes enlarged, Lymphoma (HCC), and Spleen enlarged.  He   Patient Active Problem List    Diagnosis Date Noted    Port-A-Cath in place 03/23/2023    Chemotherapy induced neutropenia (HCC) 01/18/2023    Hypogammaglobulinemia (HCC) 01/18/2023    Post herpetic neuralgia 12/05/2022    Bleeding hemorrhoids 03/22/2021    Exposure to SARS-associated coronavirus 12/10/2020    Lymphadenopathy 09/10/2020    Grade 3a follicular lymphoma of lymph nodes of multiple regions (HCC) 04/29/2019     He  has a past surgical history that  includes Dental surgery; Colonoscopy; Nasal septum surgery; Portacath placement; Lymph node biopsy (Right, 9/22/2020); pr hemorrhoidopexy stapling (N/A, 4/27/2021); and pr hemorrhoidectomy int & xtrnl 2/> column/zahida (N/A, 4/27/2021).  His family history is not on file.  He  reports that he has never smoked. He has never been exposed to tobacco smoke. He has never used smokeless tobacco. He reports that he does not currently use alcohol after a past usage of about 2.0 standard drinks of alcohol per week. He reports that he does not use drugs.  Current Outpatient Medications   Medication Sig Dispense Refill    azithromycin (Zithromax) 250 mg tablet Take 2 tablets (500 mg total) by mouth daily for 1 day, THEN 1 tablet (250 mg total) daily for 4 days. 6 tablet 0    predniSONE 50 mg tablet Take 1 tablet (50 mg total) by mouth daily for 7 days 7 tablet 0    ondansetron (ZOFRAN) 4 mg tablet Take 1 tablet (4 mg total) by mouth every 8 (eight) hours as needed for nausea or vomiting (Patient not taking: Reported on 10/2/2024) 20 tablet 0    triamcinolone (KENALOG) 0.1 % cream Apply topically 2 (two) times a day (Patient not taking: Reported on 10/2/2024) 30 g 0     No current facility-administered medications for this visit.     Current Outpatient Medications on File Prior to Visit   Medication Sig    ondansetron (ZOFRAN) 4 mg tablet Take 1 tablet (4 mg total) by mouth every 8 (eight) hours as needed for nausea or vomiting (Patient not taking: Reported on 10/2/2024)    triamcinolone (KENALOG) 0.1 % cream Apply topically 2 (two) times a day (Patient not taking: Reported on 10/2/2024)     No current facility-administered medications on file prior to visit.     He has No Known Allergies..    Review of Systems   Constitutional:  Positive for chills. Negative for fever.   HENT:  Negative for congestion, ear pain and sore throat.    Eyes:  Negative for pain.   Respiratory:  Positive for cough. Negative for shortness of breath.     Cardiovascular:  Negative for chest pain and leg swelling.   Gastrointestinal:  Negative for abdominal pain, nausea and vomiting.   Endocrine: Negative for polyuria.   Genitourinary:  Negative for difficulty urinating, frequency and urgency.   Musculoskeletal:  Negative for arthralgias and back pain.   Skin:  Negative for rash.   Neurological:  Negative for weakness and headaches.   Psychiatric/Behavioral:  Negative for sleep disturbance. The patient is not nervous/anxious.          Objective:      /72 (BP Location: Left arm, Patient Position: Sitting, Cuff Size: Standard)   Pulse 72   Temp 98.2 °F (36.8 °C) (Temporal)   Ht 6' (1.829 m)   Wt 88.5 kg (195 lb)   SpO2 98%   BMI 26.45 kg/m²     Recent Results (from the past 1344 hour(s))   CBC and differential    Collection Time: 08/26/24  3:36 PM   Result Value Ref Range    WBC 3.27 (L) 4.31 - 10.16 Thousand/uL    RBC 4.53 3.88 - 5.62 Million/uL    Hemoglobin 13.3 12.0 - 17.0 g/dL    Hematocrit 39.4 36.5 - 49.3 %    MCV 87 82 - 98 fL    MCH 29.4 26.8 - 34.3 pg    MCHC 33.8 31.4 - 37.4 g/dL    RDW 14.2 11.6 - 15.1 %    MPV 10.3 8.9 - 12.7 fL    Platelets 136 (L) 149 - 390 Thousands/uL    nRBC 0 /100 WBCs    Segmented % 41 (L) 43 - 75 %    Immature Grans % 0 0 - 2 %    Lymphocytes % 28 14 - 44 %    Monocytes % 18 (H) 4 - 12 %    Eosinophils Relative 12 (H) 0 - 6 %    Basophils Relative 1 0 - 1 %    Absolute Neutrophils 1.31 (L) 1.85 - 7.62 Thousands/µL    Absolute Immature Grans 0.01 0.00 - 0.20 Thousand/uL    Absolute Lymphocytes 0.92 0.60 - 4.47 Thousands/µL    Absolute Monocytes 0.59 0.17 - 1.22 Thousand/µL    Eosinophils Absolute 0.40 0.00 - 0.61 Thousand/µL    Basophils Absolute 0.04 0.00 - 0.10 Thousands/µL   LD,Blood    Collection Time: 08/26/24  3:36 PM   Result Value Ref Range     140 - 271 U/L        Physical Exam  Constitutional:       Appearance: Normal appearance.   HENT:      Head: Normocephalic.      Right Ear: Tympanic membrane, ear  canal and external ear normal.      Left Ear: Tympanic membrane, ear canal and external ear normal.      Nose: Nose normal. No congestion.      Mouth/Throat:      Mouth: Mucous membranes are moist.      Pharynx: Oropharynx is clear. No oropharyngeal exudate or posterior oropharyngeal erythema.   Eyes:      Extraocular Movements: Extraocular movements intact.      Conjunctiva/sclera: Conjunctivae normal.   Cardiovascular:      Rate and Rhythm: Normal rate and regular rhythm.      Heart sounds: Normal heart sounds. No murmur heard.  Pulmonary:      Effort: Pulmonary effort is normal.      Breath sounds: Normal breath sounds. No wheezing or rales.   Abdominal:      General: Abdomen is flat. There is no distension.      Palpations: Abdomen is soft.      Tenderness: There is no abdominal tenderness.   Musculoskeletal:         General: Normal range of motion.      Cervical back: Normal range of motion and neck supple.      Right lower leg: No edema.      Left lower leg: No edema.   Lymphadenopathy:      Cervical: No cervical adenopathy.   Skin:     General: Skin is warm.   Neurological:      General: No focal deficit present.      Mental Status: He is alert and oriented to person, place, and time.

## 2024-10-11 ENCOUNTER — OFFICE VISIT (OUTPATIENT)
Dept: HEMATOLOGY ONCOLOGY | Facility: CLINIC | Age: 54
End: 2024-10-11
Payer: COMMERCIAL

## 2024-10-11 VITALS
HEIGHT: 72 IN | SYSTOLIC BLOOD PRESSURE: 112 MMHG | BODY MASS INDEX: 26.82 KG/M2 | WEIGHT: 198 LBS | DIASTOLIC BLOOD PRESSURE: 78 MMHG | RESPIRATION RATE: 17 BRPM | TEMPERATURE: 98.2 F

## 2024-10-11 DIAGNOSIS — D70.1 CHEMOTHERAPY INDUCED NEUTROPENIA (HCC): ICD-10-CM

## 2024-10-11 DIAGNOSIS — C82.38 GRADE 3A FOLLICULAR LYMPHOMA OF LYMPH NODES OF MULTIPLE REGIONS (HCC): Primary | ICD-10-CM

## 2024-10-11 DIAGNOSIS — T45.1X5A CHEMOTHERAPY INDUCED NEUTROPENIA (HCC): ICD-10-CM

## 2024-10-11 DIAGNOSIS — Z95.828 PORT-A-CATH IN PLACE: ICD-10-CM

## 2024-10-11 PROBLEM — Z20.828 EXPOSURE TO SARS-ASSOCIATED CORONAVIRUS: Status: RESOLVED | Noted: 2020-12-10 | Resolved: 2024-10-11

## 2024-10-11 PROBLEM — R59.1 LYMPHADENOPATHY: Status: RESOLVED | Noted: 2020-09-10 | Resolved: 2024-10-11

## 2024-10-11 PROCEDURE — 99214 OFFICE O/P EST MOD 30 MIN: CPT | Performed by: INTERNAL MEDICINE

## 2024-10-11 NOTE — PROGRESS NOTES
Hematology/Oncology Outpatient Follow-up  Kevin Hudson 54 y.o. male 1970 [id number]    Date:  10/11/2024  Reason for Visit: Stage IV FL Grade 3 on surveillance   Interval history:    Patient overall doing well  No B-cell symptoms  Does note that he has noticed enlarging area under right jawline for the last month  No additional lumps or bumps on his person that he has noticed    ECOG 0      HPI:    Initially diagnosed in 4/2017 with imaging showing enlarged spleen associated with multiple enlarged lymph nodes status post CT-guided neck biopsy showing stage IV follicular lymphoma grade 3B, bone marrow biopsy showed 10% involvement of follicular lymphoma.  He was treated with R-CHOP for total of 6 cycles between May 2017 and September 2017. He then was maintained on rituximab 375 mg/meters squared every 2 months for total of 2 years finishing October 2019.  9/2020 patient had relapse of follicular lymphoma showed on PET. Excisional biopsy of a right inguinal lymph node 9/22/20 showed relapsed follicular lymphoma, no evidence of transformation, positive for CD 19, CD 20, CD 10, Ki-67 about 60%.  The patient was continued to be observed and had significant progression on PET scan 7/2022 elected for continued observation evaluated by [facility] 12/2022 inguinal biopsy showed follicular lymphoma Ki-67 10%, 1/2023 initiated obinutuzumab Bendamustine x 3 cycles with PET scan 4/2023 showing resolved of disease.  Continued for total of 6 cycles 6/19/2023 and has been maintained on obinutuzumab.    Oncology History   Grade 3a follicular lymphoma of lymph nodes of multiple regions (HCC)   4/29/2019 Initial Diagnosis    Grade 3a follicular lymphoma of lymph nodes of multiple regions (HCC)     6/14/2019 - 10/11/2019 Chemotherapy    riTUXimab (RITUXAN) subsequent titrated chemo infusion, 832.6 mg, Intravenous, Once, 2 of 2 cycles  Administration: 800 mg (8/9/2019), 800 mg (10/11/2019)  riTUXimab (RITUXAN) first  titrated chemo infusion, 832.6 mg, Intravenous, Once, 1 of 1 cycle  Administration: 800 mg (6/14/2019)     1/31/2023 -  Chemotherapy    alteplase (CATHFLO), 2 mg, Intracatheter, Every 2 hour PRN, 6 of 6 cycles  pegfilgrastim (NEULASTA ONPRO), 6 mg, Subcutaneous, Once, 6 of 6 cycles  Administration: 6 mg (2/28/2023), 6 mg (3/28/2023), 6 mg (4/25/2023), 6 mg (5/23/2023), 6 mg (6/22/2023), 6 mg (2/2/2023)  obinutuzumab (GAZYVA) day 1 IVPB, 1,000 mg (1000 % of original dose 100 mg), Intravenous, Once, 1 of 1 cycle  Dose modification: 1,000 mg (original dose 100 mg, Cycle 1, Reason: Dose modified as per discussion with consulting physician)  Administration: 1,000 mg (1/31/2023)  obinutuzumab (GAZYVA) subsequent titrated infusion, 1,000 mg, Intravenous, Once, 6 of 6 cycles  Administration: 1,000 mg (2/7/2023), 1,000 mg (2/27/2023), 1,000 mg (3/27/2023), 1,000 mg (4/24/2023), 1,000 mg (5/22/2023), 1,000 mg (6/21/2023), 1,000 mg (2/14/2023)  bendamustine HCL(BENDEKA) IVPB, 90 mg/m2 = 195.3 mg (100 % of original dose 90 mg/m2), Intravenous, Once, 6 of 6 cycles  Dose modification: 90 mg/m2 (original dose 90 mg/m2, Cycle 1)  Administration: 195.3 mg (2/1/2023), 195.3 mg (2/27/2023), 195.3 mg (2/28/2023), 195.3 mg (3/28/2023), 195.3 mg (4/25/2023), 195.3 mg (5/23/2023), 195.3 mg (6/22/2023), 195.3 mg (3/27/2023), 195.3 mg (4/24/2023), 195.3 mg (5/22/2023), 195.3 mg (6/21/2023), 195.3 mg (2/2/2023)     2/8/2023 -  Cancer Staged    Staging form: Hodgkin and Non-Hodgkin Lymphoma, AJCC 8th Edition  - Clinical: Stage IV (Follicular lymphoma) - Signed by Taj Patel MD on 2/8/2023  Stage prefix: Recurrence        Chemotherapy    riTUXimab (RITUXAN) subsequent titrated chemo infusion, 375 mg/m2, Intravenous, Once, 0 of 14 cycles        riTUXimab (RITUXAN) first titrated chemo infusion, 375 mg/m2, Intravenous, Once, 0 of 1 cycle    riTUXimab (RITUXAN) subsequent titrated chemo infusion, 832.6 mg, Intravenous, Once, 2 of 2  cycles    Administration: 800 mg (8/9/2019), 800 mg (10/11/2019)        riTUXimab (RITUXAN) first titrated chemo infusion, 832.6 mg, Intravenous, Once, 1 of 1 cycle    Administration: 800 mg (6/14/2019)    alteplase (CATHFLO), 2 mg, Intracatheter, Every 2 hour PRN, 6 of 6 cycles        pegfilgrastim (NEULASTA ONPRO), 6 mg, Subcutaneous, Once, 6 of 6 cycles    Administration: 6 mg (2/28/2023), 6 mg (3/28/2023), 6 mg (4/25/2023), 6 mg (5/23/2023), 6 mg (6/22/2023), 6 mg (2/2/2023)        obinutuzumab (GAZYVA) day 1 IVPB, 1,000 mg (1000 % of original dose 100 mg), Intravenous, Once, 1 of 1 cycle    Dose modification: 1,000 mg (original dose 100 mg, Cycle 1, Reason: Dose modified as per discussion with consulting physician)    Administration: 1,000 mg (1/31/2023)        obinutuzumab (GAZYVA) subsequent titrated infusion, 1,000 mg, Intravenous, Once, 6 of 6 cycles    Administration: 1,000 mg (2/7/2023), 1,000 mg (2/27/2023), 1,000 mg (3/27/2023), 1,000 mg (4/24/2023), 1,000 mg (5/22/2023), 1,000 mg (6/21/2023), 1,000 mg (2/14/2023)        bendamustine HCL(BENDEKA) IVPB, 90 mg/m2 = 195.3 mg (100 % of original dose 90 mg/m2), Intravenous, Once, 6 of 6 cycles    Dose modification: 90 mg/m2 (original dose 90 mg/m2, Cycle 1)    Administration: 195.3 mg (2/1/2023), 195.3 mg (2/27/2023), 195.3 mg (2/28/2023), 195.3 mg (3/28/2023), 195.3 mg (4/25/2023), 195.3 mg (5/23/2023), 195.3 mg (6/22/2023), 195.3 mg (3/27/2023), 195.3 mg (4/24/2023), 195.3 mg (5/22/2023), 195.3 mg (6/21/2023), 195.3 mg (2/2/2023)             ROS: Review of Systems   All other systems reviewed and are negative.      Past Medical History:   Diagnosis Date    Lymph nodes enlarged     CT bx-retropertineum today 5/18/2017    Lymphoma (HCC)     follicular    Spleen enlarged     CT bx today retropertineum-5/18/2017       Past Surgical History:   Procedure Laterality Date    COLONOSCOPY      DENTAL SURGERY      LYMPH NODE BIOPSY Right 9/22/2020    Procedure:  EXCISION BIOPSY LYMPH NODE INGUINAL, lymphoma protocol;  Surgeon: Espinoza Wilkinson MD;  Location: [facility];  Service: Surgical Oncology    NASAL SEPTUM SURGERY      PORTACATH PLACEMENT      VT HEMORRHOIDECTOMY INT & XTRNL 2/> COLUMN/LIZETTE N/A 4/27/2021    Procedure: HEMORRHOIDECTOMY EXCISION;  Surgeon: GLENYS Campbell MD;  Location: [facility];  Service: Colorectal    VT HEMORRHOIDOPEXY STAPLING N/A 4/27/2021    Procedure: HEMORRHOIDALPEXY (THD);  Surgeon: GLENYS Campbell MD;  Location: [facility];  Service: Colorectal       Social History     Socioeconomic History    Marital status: /Civil Union     Spouse name: Not on file    Number of children: Not on file    Years of education: Not on file    Highest education level: Not on file   Occupational History    Not on file   Tobacco Use    Smoking status: Never     Passive exposure: Never    Smokeless tobacco: Never   Vaping Use    Vaping status: Never Used   Substance and Sexual Activity    Alcohol use: Not Currently     Alcohol/week: 2.0 standard drinks of alcohol     Types: 2 Glasses of wine per week     Comment: monthly    Drug use: No    Sexual activity: Not on file   Other Topics Concern    Not on file   Social History Narrative    Not on file     Social Determinants of Health     Financial Resource Strain: Not on file   Food Insecurity: Not on file   Transportation Needs: Not on file   Physical Activity: Not on file   Stress: Not on file   Social Connections: Not on file   Intimate Partner Violence: Not on file   Housing Stability: Not on file       No family history on file.    No Known Allergies      Current Outpatient Medications:     ondansetron (ZOFRAN) 4 mg tablet, Take 1 tablet (4 mg total) by mouth every 8 (eight) hours as needed for nausea or vomiting (Patient not taking: Reported on 10/2/2024), Disp: 20 tablet, Rfl: 0    triamcinolone (KENALOG) 0.1 % cream, Apply topically 2 (two) times a day (Patient not taking: Reported on 10/2/2024),  Disp: 30 g, Rfl: 0      Physical Exam:  There were no vitals taken for this visit.    Physical Exam  Constitutional:       Appearance: Normal appearance.   HENT:      Head: Normocephalic and atraumatic.      Nose: Nose normal.      Mouth/Throat:      Mouth: Mucous membranes are moist.   Eyes:      Extraocular Movements: Extraocular movements intact.      Pupils: Pupils are equal, round, and reactive to light.   Neck:      Comments: 1 cm to 2 cm mildly enlarged submandibular gland, no signs of cervical lymphadenopathy axillary lymphadenopathy  Cardiovascular:      Rate and Rhythm: Normal rate and regular rhythm.   Pulmonary:      Effort: Pulmonary effort is normal.   Abdominal:      Comments: No palpable splenomegaly   Musculoskeletal:         General: Normal range of motion.   Skin:     General: Skin is warm.   Neurological:      General: No focal deficit present.   Psychiatric:         Mood and Affect: Mood normal.       Labs:  Lab Results   Component Value Date    WBC 3.27 (L) 08/26/2024    HGB 13.3 08/26/2024    HCT 39.4 08/26/2024    MCV 87 08/26/2024     (L) 08/26/2024     <-123<-215<-128    Imaging:    NM PET CT skull base to mid thigh  Result Date: 10/24/2023  Impression   1. Findings consistent with complete metabolic response to therapy. Persistent but improved non-FDG avid splenomegaly.   2. Mild nonspecific heterogeneous FDG activity involving the prostate gland with subcentimeter focus of more prominent FDG activity involving the posterior midline of the prostate gland of uncertain clinical significance. Given slightly more focal nature  of FDG activity, correlation is recommended to exclude the possibility of developing prostate carcinoma. Consider further evaluation with urologic consultation and possibly multiparametric prostate MRI as initial further steps as clinically indicated.     NM PET CT skull of base to mid thigh   Result Date: 7/01/2022  IMPRESSION:  1. Significant interval  progression of widespread hypermetabolic adenopathy in the neck, chest, abdomen and pelvis, compatible with disease progression. Significantly increased splenomegaly. Deauville score of 5.       Assessment and Plan:    1. Grade 3a follicular lymphoma of lymph nodes of multiple regions (HCC)  2. Chemotherapy induced neutropenia (HCC)  3. Port-A-Cath in place    54-year-old male initially diagnosed with stage IV follicular lymphoma grade 3B in 2017 status post 6 cycles R-CHOP and 2 yr maintenance of rituxan with reoccurrence 2020 and treatment in 2022 with Bendamustine/obinutuzumab completed 6/2023 and has been under active surveillance with no laboratory/clinical signs of progression.    -Follow up in one year, will see [facility] in 6 months  -Repeat CBC/CMP/LDH standing lab placed     Rubio Pierce DO  PGY-4 Hematology/Oncology Fellow     Patient voiced understanding and agreement in the above discussion. Aware to contact our office with questions/symptoms in the interim.     This note has been generated by voice recognition software system.  Therefore, there may be spelling, grammar, and or syntax errors. Please contact if questions arise.

## 2024-10-21 ENCOUNTER — HOSPITAL ENCOUNTER (OUTPATIENT)
Dept: INFUSION CENTER | Facility: CLINIC | Age: 54
Discharge: HOME | End: 2024-10-21
Payer: COMMERCIAL

## 2024-10-21 DIAGNOSIS — C82.38 GRADE 3A FOLLICULAR LYMPHOMA OF LYMPH NODES OF MULTIPLE REGIONS (HCC): ICD-10-CM

## 2024-10-21 DIAGNOSIS — E78.2 MIXED HYPERLIPIDEMIA: ICD-10-CM

## 2024-10-21 DIAGNOSIS — Z00.00 ANNUAL PHYSICAL EXAM: ICD-10-CM

## 2024-10-21 DIAGNOSIS — Z95.828 PORT-A-CATH IN PLACE: Primary | ICD-10-CM

## 2024-10-21 DIAGNOSIS — R73.01 IMPAIRED FASTING BLOOD SUGAR: ICD-10-CM

## 2024-10-21 LAB
ALBUMIN SERPL BCG-MCNC: 4.1 G/DL (ref 3.5–5)
ALP SERPL-CCNC: 108 U/L (ref 34–104)
ALT SERPL W P-5'-P-CCNC: 25 U/L (ref 7–52)
ANA SER QL IA: NEGATIVE
ANION GAP SERPL CALCULATED.3IONS-SCNC: 5 MMOL/L (ref 4–13)
AST SERPL W P-5'-P-CCNC: 17 U/L (ref 13–39)
BASOPHILS # BLD AUTO: 0.07 THOUSANDS/ΜL (ref 0–0.1)
BASOPHILS NFR BLD AUTO: 1 % (ref 0–1)
BILIRUB SERPL-MCNC: 0.86 MG/DL (ref 0.2–1)
BUN SERPL-MCNC: 16 MG/DL (ref 5–25)
CALCIUM SERPL-MCNC: 9 MG/DL (ref 8.4–10.2)
CHLORIDE SERPL-SCNC: 107 MMOL/L (ref 96–108)
CHOLEST SERPL-MCNC: 192 MG/DL
CO2 SERPL-SCNC: 28 MMOL/L (ref 21–32)
CREAT SERPL-MCNC: 0.81 MG/DL (ref 0.6–1.3)
EOSINOPHIL # BLD AUTO: 0.39 THOUSAND/ΜL (ref 0–0.61)
EOSINOPHIL NFR BLD AUTO: 6 % (ref 0–6)
ERYTHROCYTE [DISTWIDTH] IN BLOOD BY AUTOMATED COUNT: 13.2 % (ref 11.6–15.1)
EST. AVERAGE GLUCOSE BLD GHB EST-MCNC: 97 MG/DL
GFR SERPL CREATININE-BSD FRML MDRD: 100 ML/MIN/1.73SQ M
GLUCOSE P FAST SERPL-MCNC: 95 MG/DL (ref 65–99)
GLUCOSE SERPL-MCNC: 95 MG/DL (ref 65–140)
HBA1C MFR BLD: 5 %
HCT VFR BLD AUTO: 40.2 % (ref 36.5–49.3)
HDLC SERPL-MCNC: 35 MG/DL
HGB BLD-MCNC: 13.6 G/DL (ref 12–17)
IMM GRANULOCYTES # BLD AUTO: 0.04 THOUSAND/UL (ref 0–0.2)
IMM GRANULOCYTES NFR BLD AUTO: 1 % (ref 0–2)
LDH SERPL-CCNC: 126 U/L (ref 140–271)
LDLC SERPL CALC-MCNC: 132 MG/DL (ref 0–100)
LYMPHOCYTES # BLD AUTO: 1.19 THOUSANDS/ΜL (ref 0.6–4.47)
LYMPHOCYTES NFR BLD AUTO: 18 % (ref 14–44)
MCH RBC QN AUTO: 29.2 PG (ref 26.8–34.3)
MCHC RBC AUTO-ENTMCNC: 33.8 G/DL (ref 31.4–37.4)
MCV RBC AUTO: 86 FL (ref 82–98)
MONOCYTES # BLD AUTO: 0.53 THOUSAND/ΜL (ref 0.17–1.22)
MONOCYTES NFR BLD AUTO: 8 % (ref 4–12)
NEUTROPHILS # BLD AUTO: 4.54 THOUSANDS/ΜL (ref 1.85–7.62)
NEUTS SEG NFR BLD AUTO: 66 % (ref 43–75)
NRBC BLD AUTO-RTO: 0 /100 WBCS
PLATELET # BLD AUTO: 144 THOUSANDS/UL (ref 149–390)
PMV BLD AUTO: 9.9 FL (ref 8.9–12.7)
POTASSIUM SERPL-SCNC: 4.1 MMOL/L (ref 3.5–5.3)
PROT SERPL-MCNC: 6.4 G/DL (ref 6.4–8.4)
RBC # BLD AUTO: 4.66 MILLION/UL (ref 3.88–5.62)
SODIUM SERPL-SCNC: 140 MMOL/L (ref 135–147)
TRIGL SERPL-MCNC: 126 MG/DL
TSH SERPL DL<=0.05 MIU/L-ACNC: 2.56 UIU/ML (ref 0.45–4.5)
WBC # BLD AUTO: 6.76 THOUSAND/UL (ref 4.31–10.16)

## 2024-10-21 PROCEDURE — 86038 ANTINUCLEAR ANTIBODIES: CPT

## 2024-10-21 PROCEDURE — 83615 LACTATE (LD) (LDH) ENZYME: CPT

## 2024-10-21 PROCEDURE — 84443 ASSAY THYROID STIM HORMONE: CPT

## 2024-10-21 PROCEDURE — 80061 LIPID PANEL: CPT

## 2024-10-21 PROCEDURE — 80053 COMPREHEN METABOLIC PANEL: CPT

## 2024-10-21 PROCEDURE — 83036 HEMOGLOBIN GLYCOSYLATED A1C: CPT

## 2024-10-21 PROCEDURE — 85025 COMPLETE CBC W/AUTO DIFF WBC: CPT

## 2024-10-21 NOTE — PROGRESS NOTES
Labs drawn via PAC without incident. Confirmed next apt for 12/16/24 @ 8am @ [facility]. Diane SAUNDERS.

## 2024-11-15 ENCOUNTER — OFFICE VISIT (OUTPATIENT)
Dept: INTERNAL MEDICINE CLINIC | Facility: CLINIC | Age: 54
End: 2024-11-15
Payer: COMMERCIAL

## 2024-11-15 VITALS
HEART RATE: 60 BPM | OXYGEN SATURATION: 97 % | DIASTOLIC BLOOD PRESSURE: 76 MMHG | HEIGHT: 72 IN | SYSTOLIC BLOOD PRESSURE: 132 MMHG | BODY MASS INDEX: 27.09 KG/M2 | TEMPERATURE: 97.3 F | WEIGHT: 200 LBS

## 2024-11-15 DIAGNOSIS — C82.38 GRADE 3A FOLLICULAR LYMPHOMA OF LYMPH NODES OF MULTIPLE REGIONS (HCC): ICD-10-CM

## 2024-11-15 DIAGNOSIS — E78.2 MIXED HYPERLIPIDEMIA: Primary | ICD-10-CM

## 2024-11-15 PROCEDURE — 99214 OFFICE O/P EST MOD 30 MIN: CPT | Performed by: INTERNAL MEDICINE

## 2024-11-15 NOTE — PROGRESS NOTES
Assessment/Plan:             1. Mixed hyperlipidemia  Comments:  Diet and  exercise discussed  2. 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 male.    Follow-up on blood test done on 10/21/2024 test discussed with him        The following portions of the patient's history were reviewed and updated as appropriate: He  has a past medical history of Lymph nodes enlarged, Lymphoma (HCC), and Spleen enlarged.  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  His family history is not on file.  He  reports that he has never smoked. He has never been exposed to tobacco smoke. He has never used smokeless tobacco. He reports that he does not currently use alcohol after a past usage of about 2.0 standard drinks of alcohol per week. He reports that he does not use drugs.  Current Outpatient Medications   Medication Sig Dispense Refill    ondansetron (ZOFRAN) 4 mg tablet Take 1 tablet (4 mg total) by mouth every 8 (eight) hours as needed for nausea or vomiting (Patient not taking: Reported on 10/2/2024) 20 tablet 0    triamcinolone (KENALOG) 0.1 % cream Apply topically 2 (two) times a day (Patient not taking: Reported on 10/2/2024) 30 g 0     No current facility-administered medications for this visit.     Current Outpatient Medications on File Prior to Visit   Medication Sig    ondansetron  (ZOFRAN) 4 mg tablet Take 1 tablet (4 mg total) by mouth every 8 (eight) hours as needed for nausea or vomiting (Patient not taking: Reported on 10/2/2024)    triamcinolone (KENALOG) 0.1 % cream Apply topically 2 (two) times a day (Patient not taking: Reported on 10/2/2024)     No current facility-administered medications on file prior to visit.     He has no known allergies..    Review of Systems   Constitutional:  Negative for chills and fever.   HENT:  Negative for congestion, ear pain and sore throat.    Eyes:  Negative for pain.   Respiratory:  Negative for cough and shortness of breath.    Cardiovascular:  Negative for chest pain and leg swelling.   Gastrointestinal:  Negative for abdominal pain, nausea and vomiting.   Endocrine: Negative for polyuria.   Genitourinary:  Negative for difficulty urinating, frequency and urgency.   Musculoskeletal:  Negative for arthralgias and back pain.   Skin:  Negative for rash.   Neurological:  Negative for weakness and headaches.   Psychiatric/Behavioral:  Negative for sleep disturbance. The patient is not nervous/anxious.          Objective:      /76 (BP Location: Left arm, Patient Position: Sitting, Cuff Size: Standard)   Pulse 60   Temp (!) 97.3 °F (36.3 °C) (Temporal)   Ht 6' (1.829 m)   Wt 90.7 kg (200 lb)   SpO2 97%   BMI 27.12 kg/m²     Recent Results (from the past 8 weeks)   SARKIS Screen w/ Reflex to Titer/Pattern    Collection Time: 10/21/24  8:07 AM   Result Value Ref Range    SARKIS Negative Negative   Lipid Panel with Direct LDL reflex    Collection Time: 10/21/24  8:07 AM   Result Value Ref Range    Cholesterol 192 See Comment mg/dL    Triglycerides 126 See Comment mg/dL    HDL, Direct 35 (L) >=40 mg/dL    LDL Calculated 132 (H) 0 - 100 mg/dL   TSH, 3rd generation    Collection Time: 10/21/24  8:07 AM   Result Value Ref Range    TSH 3RD GENERATON 2.555 0.450 - 4.500 uIU/mL   Hemoglobin A1C    Collection Time: 10/21/24  8:07 AM   Result Value Ref Range     Hemoglobin A1C 5.0 Normal 4.0-5.6%; PreDiabetic 5.7-6.4%; Diabetic >=6.5%; Glycemic control for adults with diabetes <7.0% %    EAG 97 mg/dl   Comprehensive metabolic panel    Collection Time: 10/21/24  8:07 AM   Result Value Ref Range    Sodium 140 135 - 147 mmol/L    Potassium 4.1 3.5 - 5.3 mmol/L    Chloride 107 96 - 108 mmol/L    CO2 28 21 - 32 mmol/L    ANION GAP 5 4 - 13 mmol/L    BUN 16 5 - 25 mg/dL    Creatinine 0.81 0.60 - 1.30 mg/dL    Glucose 95 65 - 140 mg/dL    Glucose, Fasting 95 65 - 99 mg/dL    Calcium 9.0 8.4 - 10.2 mg/dL    AST 17 13 - 39 U/L    ALT 25 7 - 52 U/L    Alkaline Phosphatase 108 (H) 34 - 104 U/L    Total Protein 6.4 6.4 - 8.4 g/dL    Albumin 4.1 3.5 - 5.0 g/dL    Total Bilirubin 0.86 0.20 - 1.00 mg/dL    eGFR 100 ml/min/1.73sq m   CBC and differential    Collection Time: 10/21/24  8:07 AM   Result Value Ref Range    WBC 6.76 4.31 - 10.16 Thousand/uL    RBC 4.66 3.88 - 5.62 Million/uL    Hemoglobin 13.6 12.0 - 17.0 g/dL    Hematocrit 40.2 36.5 - 49.3 %    MCV 86 82 - 98 fL    MCH 29.2 26.8 - 34.3 pg    MCHC 33.8 31.4 - 37.4 g/dL    RDW 13.2 11.6 - 15.1 %    MPV 9.9 8.9 - 12.7 fL    Platelets 144 (L) 149 - 390 Thousands/uL    nRBC 0 /100 WBCs    Segmented % 66 43 - 75 %    Immature Grans % 1 0 - 2 %    Lymphocytes % 18 14 - 44 %    Monocytes % 8 4 - 12 %    Eosinophils Relative 6 0 - 6 %    Basophils Relative 1 0 - 1 %    Absolute Neutrophils 4.54 1.85 - 7.62 Thousands/µL    Absolute Immature Grans 0.04 0.00 - 0.20 Thousand/uL    Absolute Lymphocytes 1.19 0.60 - 4.47 Thousands/µL    Absolute Monocytes 0.53 0.17 - 1.22 Thousand/µL    Eosinophils Absolute 0.39 0.00 - 0.61 Thousand/µL    Basophils Absolute 0.07 0.00 - 0.10 Thousands/µL   LD,Blood    Collection Time: 10/21/24  8:07 AM   Result Value Ref Range     (L) 140 - 271 U/L        Physical Exam  Constitutional:       Appearance: Normal appearance.   HENT:      Head: Normocephalic.      Right Ear: Tympanic membrane, ear canal  and external ear normal.      Left Ear: Tympanic membrane, ear canal and external ear normal.      Nose: Nose normal. No congestion.      Mouth/Throat:      Mouth: Mucous membranes are moist.      Pharynx: Oropharynx is clear. No oropharyngeal exudate or posterior oropharyngeal erythema.   Eyes:      Extraocular Movements: Extraocular movements intact.      Conjunctiva/sclera: Conjunctivae normal.   Cardiovascular:      Rate and Rhythm: Normal rate and regular rhythm.      Heart sounds: Normal heart sounds. No murmur heard.  Pulmonary:      Effort: Pulmonary effort is normal.      Breath sounds: Normal breath sounds. No wheezing or rales.   Abdominal:      General: Abdomen is flat. There is no distension.      Palpations: Abdomen is soft.      Tenderness: There is no abdominal tenderness.   Musculoskeletal:         General: Normal range of motion.      Cervical back: Normal range of motion and neck supple.      Right lower leg: No edema.      Left lower leg: No edema.   Lymphadenopathy:      Cervical: No cervical adenopathy.   Skin:     General: Skin is warm.   Neurological:      General: No focal deficit present.      Mental Status: He is alert and oriented to person, place, and time.

## 2024-12-16 ENCOUNTER — HOSPITAL ENCOUNTER (OUTPATIENT)
Dept: INFUSION CENTER | Facility: CLINIC | Age: 54
Discharge: HOME | End: 2024-12-16
Payer: COMMERCIAL

## 2024-12-16 DIAGNOSIS — Z95.828 PORT-A-CATH IN PLACE: ICD-10-CM

## 2024-12-16 DIAGNOSIS — C82.38 GRADE 3A FOLLICULAR LYMPHOMA OF LYMPH NODES OF MULTIPLE REGIONS (HCC): Primary | ICD-10-CM

## 2024-12-16 LAB
ALBUMIN SERPL BCG-MCNC: 4.3 G/DL (ref 3.5–5)
ALP SERPL-CCNC: 66 U/L (ref 34–104)
ALT SERPL W P-5'-P-CCNC: 20 U/L (ref 7–52)
ANION GAP SERPL CALCULATED.3IONS-SCNC: 4 MMOL/L (ref 4–13)
AST SERPL W P-5'-P-CCNC: 16 U/L (ref 13–39)
BASOPHILS # BLD AUTO: 0.06 THOUSANDS/ÂΜL (ref 0–0.1)
BASOPHILS NFR BLD AUTO: 1 % (ref 0–1)
BILIRUB SERPL-MCNC: 0.76 MG/DL (ref 0.2–1)
BUN SERPL-MCNC: 17 MG/DL (ref 5–25)
CALCIUM SERPL-MCNC: 9 MG/DL (ref 8.4–10.2)
CHLORIDE SERPL-SCNC: 106 MMOL/L (ref 96–108)
CO2 SERPL-SCNC: 28 MMOL/L (ref 21–32)
CREAT SERPL-MCNC: 0.85 MG/DL (ref 0.6–1.3)
EOSINOPHIL # BLD AUTO: 0.23 THOUSAND/ÂΜL (ref 0–0.61)
EOSINOPHIL NFR BLD AUTO: 4 % (ref 0–6)
ERYTHROCYTE [DISTWIDTH] IN BLOOD BY AUTOMATED COUNT: 14 % (ref 11.6–15.1)
GFR SERPL CREATININE-BSD FRML MDRD: 98 ML/MIN/1.73SQ M
GLUCOSE SERPL-MCNC: 92 MG/DL (ref 65–140)
HCT VFR BLD AUTO: 42.4 % (ref 36.5–49.3)
HGB BLD-MCNC: 14.8 G/DL (ref 12–17)
IMM GRANULOCYTES # BLD AUTO: 0.02 THOUSAND/UL (ref 0–0.2)
IMM GRANULOCYTES NFR BLD AUTO: 0 % (ref 0–2)
LDH SERPL-CCNC: 113 U/L (ref 140–271)
LYMPHOCYTES # BLD AUTO: 1.17 THOUSANDS/ÂΜL (ref 0.6–4.47)
LYMPHOCYTES NFR BLD AUTO: 21 % (ref 14–44)
MCH RBC QN AUTO: 29.8 PG (ref 26.8–34.3)
MCHC RBC AUTO-ENTMCNC: 34.9 G/DL (ref 31.4–37.4)
MCV RBC AUTO: 85 FL (ref 82–98)
MONOCYTES # BLD AUTO: 0.4 THOUSAND/ÂΜL (ref 0.17–1.22)
MONOCYTES NFR BLD AUTO: 7 % (ref 4–12)
NEUTROPHILS # BLD AUTO: 3.79 THOUSANDS/ÂΜL (ref 1.85–7.62)
NEUTS SEG NFR BLD AUTO: 67 % (ref 43–75)
NRBC BLD AUTO-RTO: 0 /100 WBCS
PLATELET # BLD AUTO: 140 THOUSANDS/UL (ref 149–390)
PLATELET BLD QL SMEAR: ADEQUATE
PMV BLD AUTO: 10.1 FL (ref 8.9–12.7)
POTASSIUM SERPL-SCNC: 4.2 MMOL/L (ref 3.5–5.3)
PROT SERPL-MCNC: 6.4 G/DL (ref 6.4–8.4)
RBC # BLD AUTO: 4.97 MILLION/UL (ref 3.88–5.62)
RBC MORPH BLD: NORMAL
SODIUM SERPL-SCNC: 138 MMOL/L (ref 135–147)
WBC # BLD AUTO: 5.67 THOUSAND/UL (ref 4.31–10.16)

## 2024-12-16 PROCEDURE — 80053 COMPREHEN METABOLIC PANEL: CPT

## 2024-12-16 PROCEDURE — 85025 COMPLETE CBC W/AUTO DIFF WBC: CPT

## 2024-12-16 PROCEDURE — 83615 LACTATE (LD) (LDH) ENZYME: CPT

## 2024-12-16 NOTE — PROGRESS NOTES
Pt here for central labs, offers no complaints. Labs drawn and sent to the lab. Port flushed per protocol. Aware to schedule next appointment for 8 weeks. AVS declined..

## 2025-01-08 NOTE — PROGRESS NOTES
Pt states he no longer feels the pressure in his chest when he takes a deep breath Vitals capture stopped.

## 2025-02-17 ENCOUNTER — HOSPITAL ENCOUNTER (OUTPATIENT)
Dept: INFUSION CENTER | Facility: CLINIC | Age: 55
Discharge: HOME | End: 2025-02-17
Payer: COMMERCIAL

## 2025-02-17 DIAGNOSIS — C82.38 GRADE 3A FOLLICULAR LYMPHOMA OF LYMPH NODES OF MULTIPLE REGIONS (HCC): ICD-10-CM

## 2025-02-17 DIAGNOSIS — Z95.828 PORT-A-CATH IN PLACE: Primary | ICD-10-CM

## 2025-02-17 LAB
ALBUMIN SERPL BCG-MCNC: 4.2 G/DL (ref 3.5–5)
ALP SERPL-CCNC: 73 U/L (ref 34–104)
ALT SERPL W P-5'-P-CCNC: 33 U/L (ref 7–52)
ANION GAP SERPL CALCULATED.3IONS-SCNC: 6 MMOL/L (ref 4–13)
AST SERPL W P-5'-P-CCNC: 25 U/L (ref 13–39)
BASOPHILS # BLD AUTO: 0.06 THOUSANDS/ΜL (ref 0–0.1)
BASOPHILS NFR BLD AUTO: 1 % (ref 0–1)
BILIRUB SERPL-MCNC: 0.6 MG/DL (ref 0.2–1)
BUN SERPL-MCNC: 15 MG/DL (ref 5–25)
CALCIUM SERPL-MCNC: 8.8 MG/DL (ref 8.4–10.2)
CHLORIDE SERPL-SCNC: 108 MMOL/L (ref 96–108)
CO2 SERPL-SCNC: 27 MMOL/L (ref 21–32)
CREAT SERPL-MCNC: 0.96 MG/DL (ref 0.6–1.3)
EOSINOPHIL # BLD AUTO: 0.24 THOUSAND/ΜL (ref 0–0.61)
EOSINOPHIL NFR BLD AUTO: 4 % (ref 0–6)
ERYTHROCYTE [DISTWIDTH] IN BLOOD BY AUTOMATED COUNT: 13.7 % (ref 11.6–15.1)
GFR SERPL CREATININE-BSD FRML MDRD: 88 ML/MIN/1.73SQ M
GLUCOSE SERPL-MCNC: 96 MG/DL (ref 65–140)
HCT VFR BLD AUTO: 41.5 % (ref 36.5–49.3)
HGB BLD-MCNC: 14.4 G/DL (ref 12–17)
IMM GRANULOCYTES # BLD AUTO: 0.01 THOUSAND/UL (ref 0–0.2)
IMM GRANULOCYTES NFR BLD AUTO: 0 % (ref 0–2)
LDH SERPL-CCNC: 129 U/L (ref 140–271)
LYMPHOCYTES # BLD AUTO: 1.48 THOUSANDS/ΜL (ref 0.6–4.47)
LYMPHOCYTES NFR BLD AUTO: 25 % (ref 14–44)
MCH RBC QN AUTO: 30.3 PG (ref 26.8–34.3)
MCHC RBC AUTO-ENTMCNC: 34.7 G/DL (ref 31.4–37.4)
MCV RBC AUTO: 87 FL (ref 82–98)
MONOCYTES # BLD AUTO: 0.48 THOUSAND/ΜL (ref 0.17–1.22)
MONOCYTES NFR BLD AUTO: 8 % (ref 4–12)
NEUTROPHILS # BLD AUTO: 3.67 THOUSANDS/ΜL (ref 1.85–7.62)
NEUTS SEG NFR BLD AUTO: 62 % (ref 43–75)
NRBC BLD AUTO-RTO: 0 /100 WBCS
PLATELET # BLD AUTO: 134 THOUSANDS/UL (ref 149–390)
PMV BLD AUTO: 10.3 FL (ref 8.9–12.7)
POTASSIUM SERPL-SCNC: 3.9 MMOL/L (ref 3.5–5.3)
PROT SERPL-MCNC: 6 G/DL (ref 6.4–8.4)
RBC # BLD AUTO: 4.76 MILLION/UL (ref 3.88–5.62)
SODIUM SERPL-SCNC: 141 MMOL/L (ref 135–147)
WBC # BLD AUTO: 5.94 THOUSAND/UL (ref 4.31–10.16)

## 2025-02-17 PROCEDURE — 80053 COMPREHEN METABOLIC PANEL: CPT

## 2025-02-17 PROCEDURE — 83615 LACTATE (LD) (LDH) ENZYME: CPT

## 2025-02-17 PROCEDURE — 85025 COMPLETE CBC W/AUTO DIFF WBC: CPT

## 2025-02-17 NOTE — PROGRESS NOTES
Pt arrives to infusion center for lab draw. Offering no complaints. PAC accessed without issue, brisk blood returned noted, labs obtained, line flushed, and deaccessed. Pt will make next appointment on his way out. AVS declined.

## 2025-03-27 ENCOUNTER — HOSPITAL ENCOUNTER (OUTPATIENT)
Dept: RADIOLOGY | Age: 55
Discharge: HOME | End: 2025-03-27
Payer: COMMERCIAL

## 2025-03-27 ENCOUNTER — HOSPITAL ENCOUNTER (OUTPATIENT)
Dept: INFUSION CENTER | Facility: CLINIC | Age: 55
Discharge: HOME | End: 2025-03-27
Payer: COMMERCIAL

## 2025-03-27 DIAGNOSIS — C82.33 FOLLICULAR LYMPHOMA GRADE IIIA, INTRA-ABDOMINAL LYMPH NODES (HCC): ICD-10-CM

## 2025-03-27 DIAGNOSIS — Z95.828 PORT-A-CATH IN PLACE: Primary | ICD-10-CM

## 2025-03-27 DIAGNOSIS — C82.38 GRADE 3A FOLLICULAR LYMPHOMA OF LYMPH NODES OF MULTIPLE REGIONS (HCC): ICD-10-CM

## 2025-03-27 LAB
ALBUMIN SERPL BCG-MCNC: 4.6 G/DL (ref 3.5–5)
ALP SERPL-CCNC: 77 U/L (ref 34–104)
ALT SERPL W P-5'-P-CCNC: 18 U/L (ref 7–52)
ANION GAP SERPL CALCULATED.3IONS-SCNC: 3 MMOL/L (ref 4–13)
AST SERPL W P-5'-P-CCNC: 15 U/L (ref 13–39)
BASOPHILS # BLD AUTO: 0.06 THOUSANDS/ÂΜL (ref 0–0.1)
BASOPHILS NFR BLD AUTO: 1 % (ref 0–1)
BILIRUB SERPL-MCNC: 0.83 MG/DL (ref 0.2–1)
BUN SERPL-MCNC: 15 MG/DL (ref 5–25)
CALCIUM SERPL-MCNC: 9.3 MG/DL (ref 8.4–10.2)
CHLORIDE SERPL-SCNC: 105 MMOL/L (ref 96–108)
CO2 SERPL-SCNC: 30 MMOL/L (ref 21–32)
CREAT SERPL-MCNC: 0.87 MG/DL (ref 0.6–1.3)
EOSINOPHIL # BLD AUTO: 0.29 THOUSAND/ÂΜL (ref 0–0.61)
EOSINOPHIL NFR BLD AUTO: 4 % (ref 0–6)
ERYTHROCYTE [DISTWIDTH] IN BLOOD BY AUTOMATED COUNT: 13.2 % (ref 11.6–15.1)
GFR SERPL CREATININE-BSD FRML MDRD: 96 ML/MIN/1.73SQ M
GLUCOSE SERPL-MCNC: 86 MG/DL (ref 65–140)
GLUCOSE SERPL-MCNC: 99 MG/DL (ref 65–140)
HCT VFR BLD AUTO: 42.9 % (ref 36.5–49.3)
HGB BLD-MCNC: 14.9 G/DL (ref 12–17)
IMM GRANULOCYTES # BLD AUTO: 0.03 THOUSAND/UL (ref 0–0.2)
IMM GRANULOCYTES NFR BLD AUTO: 0 % (ref 0–2)
LDH SERPL-CCNC: 120 U/L (ref 140–271)
LYMPHOCYTES # BLD AUTO: 1.82 THOUSANDS/ÂΜL (ref 0.6–4.47)
LYMPHOCYTES NFR BLD AUTO: 23 % (ref 14–44)
MCH RBC QN AUTO: 29.7 PG (ref 26.8–34.3)
MCHC RBC AUTO-ENTMCNC: 34.7 G/DL (ref 31.4–37.4)
MCV RBC AUTO: 86 FL (ref 82–98)
MONOCYTES # BLD AUTO: 0.53 THOUSAND/ÂΜL (ref 0.17–1.22)
MONOCYTES NFR BLD AUTO: 7 % (ref 4–12)
NEUTROPHILS # BLD AUTO: 5.13 THOUSANDS/ÂΜL (ref 1.85–7.62)
NEUTS SEG NFR BLD AUTO: 65 % (ref 43–75)
NRBC BLD AUTO-RTO: 0 /100 WBCS
PLATELET # BLD AUTO: 156 THOUSANDS/UL (ref 149–390)
PMV BLD AUTO: 10.2 FL (ref 8.9–12.7)
POTASSIUM SERPL-SCNC: 4.1 MMOL/L (ref 3.5–5.3)
PROT SERPL-MCNC: 6.8 G/DL (ref 6.4–8.4)
RBC # BLD AUTO: 5.02 MILLION/UL (ref 3.88–5.62)
SODIUM SERPL-SCNC: 138 MMOL/L (ref 135–147)
WBC # BLD AUTO: 7.86 THOUSAND/UL (ref 4.31–10.16)

## 2025-03-27 PROCEDURE — 80053 COMPREHEN METABOLIC PANEL: CPT

## 2025-03-27 PROCEDURE — 82948 REAGENT STRIP/BLOOD GLUCOSE: CPT

## 2025-03-27 PROCEDURE — A9552 F18 FDG: HCPCS

## 2025-03-27 PROCEDURE — 83615 LACTATE (LD) (LDH) ENZYME: CPT

## 2025-03-27 PROCEDURE — 78815 PET IMAGE W/CT SKULL-THIGH: CPT

## 2025-03-27 PROCEDURE — 85025 COMPLETE CBC W/AUTO DIFF WBC: CPT

## 2025-03-27 NOTE — PROGRESS NOTES
Labs drawn via PAC without incident. Confirmed next apt for 5/22/25 @ 7:30am @ [facility]. Diane SAUNDERS.

## 2025-04-29 PROCEDURE — 88342 IMHCHEM/IMCYTCHM 1ST ANTB: CPT | Performed by: STUDENT IN AN ORGANIZED HEALTH CARE EDUCATION/TRAINING PROGRAM

## 2025-04-29 PROCEDURE — 88305 TISSUE EXAM BY PATHOLOGIST: CPT | Performed by: STUDENT IN AN ORGANIZED HEALTH CARE EDUCATION/TRAINING PROGRAM

## 2025-04-29 PROCEDURE — 88377 M/PHMTRC ALYS ISHQUANT/SEMIQ: CPT | Performed by: STUDENT IN AN ORGANIZED HEALTH CARE EDUCATION/TRAINING PROGRAM

## 2025-04-29 PROCEDURE — 88341 IMHCHEM/IMCYTCHM EA ADD ANTB: CPT | Performed by: STUDENT IN AN ORGANIZED HEALTH CARE EDUCATION/TRAINING PROGRAM

## 2025-04-29 PROCEDURE — 88360 TUMOR IMMUNOHISTOCHEM/MANUAL: CPT | Performed by: STUDENT IN AN ORGANIZED HEALTH CARE EDUCATION/TRAINING PROGRAM

## 2025-04-29 PROCEDURE — 88173 CYTOPATH EVAL FNA REPORT: CPT | Performed by: STUDENT IN AN ORGANIZED HEALTH CARE EDUCATION/TRAINING PROGRAM

## 2025-05-06 PROCEDURE — 88305 TISSUE EXAM BY PATHOLOGIST: CPT | Performed by: STUDENT IN AN ORGANIZED HEALTH CARE EDUCATION/TRAINING PROGRAM

## 2025-05-06 PROCEDURE — 88341 IMHCHEM/IMCYTCHM EA ADD ANTB: CPT | Performed by: STUDENT IN AN ORGANIZED HEALTH CARE EDUCATION/TRAINING PROGRAM

## 2025-05-06 PROCEDURE — 88360 TUMOR IMMUNOHISTOCHEM/MANUAL: CPT | Performed by: STUDENT IN AN ORGANIZED HEALTH CARE EDUCATION/TRAINING PROGRAM

## 2025-05-06 PROCEDURE — 88173 CYTOPATH EVAL FNA REPORT: CPT | Performed by: STUDENT IN AN ORGANIZED HEALTH CARE EDUCATION/TRAINING PROGRAM

## 2025-05-06 PROCEDURE — 88342 IMHCHEM/IMCYTCHM 1ST ANTB: CPT | Performed by: STUDENT IN AN ORGANIZED HEALTH CARE EDUCATION/TRAINING PROGRAM

## 2025-05-07 ENCOUNTER — APPOINTMENT (OUTPATIENT)
Dept: LAB | Age: 55
End: 2025-05-07
Payer: COMMERCIAL

## 2025-05-07 DIAGNOSIS — C82.33 FOLLICULAR LYMPHOMA GRADE IIIA, INTRA-ABDOMINAL LYMPH NODES (HCC): ICD-10-CM

## 2025-05-07 LAB
ALBUMIN SERPL BCG-MCNC: 4.3 G/DL (ref 3.5–5)
ALP SERPL-CCNC: 82 U/L (ref 34–104)
ALT SERPL W P-5'-P-CCNC: 34 U/L (ref 7–52)
ANION GAP SERPL CALCULATED.3IONS-SCNC: 8 MMOL/L (ref 4–13)
AST SERPL W P-5'-P-CCNC: 21 U/L (ref 13–39)
BASOPHILS # BLD AUTO: 0.07 THOUSANDS/ÂΜL (ref 0–0.1)
BASOPHILS NFR BLD AUTO: 1 % (ref 0–1)
BILIRUB SERPL-MCNC: 0.69 MG/DL (ref 0.2–1)
BUN SERPL-MCNC: 14 MG/DL (ref 5–25)
CALCIUM SERPL-MCNC: 9.2 MG/DL (ref 8.4–10.2)
CHLORIDE SERPL-SCNC: 106 MMOL/L (ref 96–108)
CO2 SERPL-SCNC: 28 MMOL/L (ref 21–32)
CREAT SERPL-MCNC: 0.84 MG/DL (ref 0.6–1.3)
EOSINOPHIL # BLD AUTO: 0.3 THOUSAND/ÂΜL (ref 0–0.61)
EOSINOPHIL NFR BLD AUTO: 4 % (ref 0–6)
ERYTHROCYTE [DISTWIDTH] IN BLOOD BY AUTOMATED COUNT: 13.3 % (ref 11.6–15.1)
GFR SERPL CREATININE-BSD FRML MDRD: 97 ML/MIN/1.73SQ M
GLUCOSE P FAST SERPL-MCNC: 101 MG/DL (ref 65–99)
HCT VFR BLD AUTO: 45.4 % (ref 36.5–49.3)
HGB BLD-MCNC: 14.7 G/DL (ref 12–17)
IMM GRANULOCYTES # BLD AUTO: 0.03 THOUSAND/UL (ref 0–0.2)
IMM GRANULOCYTES NFR BLD AUTO: 0 % (ref 0–2)
INR PPP: 0.9 (ref 0.85–1.19)
LYMPHOCYTES # BLD AUTO: 1.67 THOUSANDS/ÂΜL (ref 0.6–4.47)
LYMPHOCYTES NFR BLD AUTO: 25 % (ref 14–44)
MCH RBC QN AUTO: 28.8 PG (ref 26.8–34.3)
MCHC RBC AUTO-ENTMCNC: 32.4 G/DL (ref 31.4–37.4)
MCV RBC AUTO: 89 FL (ref 82–98)
MONOCYTES # BLD AUTO: 0.55 THOUSAND/ÂΜL (ref 0.17–1.22)
MONOCYTES NFR BLD AUTO: 8 % (ref 4–12)
NEUTROPHILS # BLD AUTO: 4.18 THOUSANDS/ÂΜL (ref 1.85–7.62)
NEUTS SEG NFR BLD AUTO: 62 % (ref 43–75)
NRBC BLD AUTO-RTO: 0 /100 WBCS
PLATELET # BLD AUTO: 142 THOUSANDS/UL (ref 149–390)
PMV BLD AUTO: 11 FL (ref 8.9–12.7)
POTASSIUM SERPL-SCNC: 4.3 MMOL/L (ref 3.5–5.3)
PROT SERPL-MCNC: 6.4 G/DL (ref 6.4–8.4)
PROTHROMBIN TIME: 12.5 SECONDS (ref 12.3–15)
RBC # BLD AUTO: 5.1 MILLION/UL (ref 3.88–5.62)
SODIUM SERPL-SCNC: 142 MMOL/L (ref 135–147)
WBC # BLD AUTO: 6.8 THOUSAND/UL (ref 4.31–10.16)

## 2025-05-07 PROCEDURE — 85610 PROTHROMBIN TIME: CPT

## 2025-05-07 PROCEDURE — 85025 COMPLETE CBC W/AUTO DIFF WBC: CPT

## 2025-05-07 PROCEDURE — 83625 ASSAY OF LDH ENZYMES: CPT

## 2025-05-07 PROCEDURE — 36415 COLL VENOUS BLD VENIPUNCTURE: CPT

## 2025-05-07 PROCEDURE — 80053 COMPREHEN METABOLIC PANEL: CPT

## 2025-05-07 PROCEDURE — 83615 LACTATE (LD) (LDH) ENZYME: CPT

## 2025-05-08 LAB
LDH SERPL-CCNC: 138 IU/L (ref 121–224)
LDH1 CFR SERPL ELPH: 28 % (ref 17–32)
LDH2 CFR SERPL ELPH: 37 % (ref 25–40)
LDH3 CFR SERPL ELPH: 20 % (ref 17–27)
LDH4 CFR SERPL ELPH: 7 % (ref 5–13)
LDH5 CFR SERPL ELPH: 8 % (ref 4–20)

## 2025-05-22 ENCOUNTER — HOSPITAL ENCOUNTER (OUTPATIENT)
Dept: INFUSION CENTER | Facility: CLINIC | Age: 55
Discharge: HOME | End: 2025-05-22
Payer: COMMERCIAL

## 2025-05-22 DIAGNOSIS — Z95.828 PORT-A-CATH IN PLACE: Primary | ICD-10-CM

## 2025-05-22 DIAGNOSIS — C82.38 GRADE 3A FOLLICULAR LYMPHOMA OF LYMPH NODES OF MULTIPLE REGIONS (HCC): ICD-10-CM

## 2025-05-22 LAB
ALBUMIN SERPL BCG-MCNC: 4.3 G/DL (ref 3.5–5)
ALP SERPL-CCNC: 77 U/L (ref 34–104)
ALT SERPL W P-5'-P-CCNC: 21 U/L (ref 7–52)
ANION GAP SERPL CALCULATED.3IONS-SCNC: 3 MMOL/L (ref 4–13)
AST SERPL W P-5'-P-CCNC: 19 U/L (ref 13–39)
BASOPHILS # BLD AUTO: 0.07 THOUSANDS/ÂΜL (ref 0–0.1)
BASOPHILS NFR BLD AUTO: 1 % (ref 0–1)
BILIRUB SERPL-MCNC: 0.78 MG/DL (ref 0.2–1)
BUN SERPL-MCNC: 17 MG/DL (ref 5–25)
CALCIUM SERPL-MCNC: 9 MG/DL (ref 8.4–10.2)
CHLORIDE SERPL-SCNC: 106 MMOL/L (ref 96–108)
CO2 SERPL-SCNC: 30 MMOL/L (ref 21–32)
CREAT SERPL-MCNC: 0.97 MG/DL (ref 0.6–1.3)
EOSINOPHIL # BLD AUTO: 0.34 THOUSAND/ÂΜL (ref 0–0.61)
EOSINOPHIL NFR BLD AUTO: 5 % (ref 0–6)
ERYTHROCYTE [DISTWIDTH] IN BLOOD BY AUTOMATED COUNT: 13.1 % (ref 11.6–15.1)
GFR SERPL CREATININE-BSD FRML MDRD: 86 ML/MIN/1.73SQ M
GLUCOSE SERPL-MCNC: 104 MG/DL (ref 65–140)
HCT VFR BLD AUTO: 42.2 % (ref 36.5–49.3)
HGB BLD-MCNC: 14.6 G/DL (ref 12–17)
IMM GRANULOCYTES # BLD AUTO: 0.02 THOUSAND/UL (ref 0–0.2)
IMM GRANULOCYTES NFR BLD AUTO: 0 % (ref 0–2)
LDH SERPL-CCNC: 126 U/L (ref 140–271)
LYMPHOCYTES # BLD AUTO: 1.38 THOUSANDS/ÂΜL (ref 0.6–4.47)
LYMPHOCYTES NFR BLD AUTO: 21 % (ref 14–44)
MCH RBC QN AUTO: 29.6 PG (ref 26.8–34.3)
MCHC RBC AUTO-ENTMCNC: 34.6 G/DL (ref 31.4–37.4)
MCV RBC AUTO: 85 FL (ref 82–98)
MONOCYTES # BLD AUTO: 0.47 THOUSAND/ÂΜL (ref 0.17–1.22)
MONOCYTES NFR BLD AUTO: 7 % (ref 4–12)
NEUTROPHILS # BLD AUTO: 4.23 THOUSANDS/ÂΜL (ref 1.85–7.62)
NEUTS SEG NFR BLD AUTO: 66 % (ref 43–75)
NRBC BLD AUTO-RTO: 0 /100 WBCS
PLATELET # BLD AUTO: 143 THOUSANDS/UL (ref 149–390)
PMV BLD AUTO: 10 FL (ref 8.9–12.7)
POTASSIUM SERPL-SCNC: 4.3 MMOL/L (ref 3.5–5.3)
PROT SERPL-MCNC: 6.2 G/DL (ref 6.4–8.4)
RBC # BLD AUTO: 4.94 MILLION/UL (ref 3.88–5.62)
SODIUM SERPL-SCNC: 139 MMOL/L (ref 135–147)
WBC # BLD AUTO: 6.51 THOUSAND/UL (ref 4.31–10.16)

## 2025-05-22 PROCEDURE — 83615 LACTATE (LD) (LDH) ENZYME: CPT

## 2025-05-22 PROCEDURE — 85025 COMPLETE CBC W/AUTO DIFF WBC: CPT

## 2025-05-22 PROCEDURE — 80053 COMPREHEN METABOLIC PANEL: CPT

## 2025-05-22 NOTE — PROGRESS NOTES
Pt to clinic for lab draw from port. Pt tolerated without complications. Next appointment 7/17 at 8:00

## 2025-06-10 ENCOUNTER — HOSPITAL ENCOUNTER (OUTPATIENT)
Dept: RADIOLOGY | Age: 55
Discharge: HOME | End: 2025-06-10
Attending: INTERNAL MEDICINE
Payer: COMMERCIAL

## 2025-06-10 DIAGNOSIS — C82.08 FOLLICULAR LYMPHOMA GRADE I OF LYMPH NODES OF MULTIPLE SITES (HCC): ICD-10-CM

## 2025-06-10 LAB — GLUCOSE SERPL-MCNC: 103 MG/DL (ref 65–140)

## 2025-06-10 PROCEDURE — 78815 PET IMAGE W/CT SKULL-THIGH: CPT

## 2025-06-10 PROCEDURE — 82948 REAGENT STRIP/BLOOD GLUCOSE: CPT

## 2025-06-10 PROCEDURE — A9552 F18 FDG: HCPCS

## 2025-07-08 ENCOUNTER — APPOINTMENT (OUTPATIENT)
Dept: LAB | Age: 55
End: 2025-07-08
Payer: COMMERCIAL

## 2025-07-08 DIAGNOSIS — C82.33 FOLLICULAR LYMPHOMA GRADE IIIA OF INTRA-ABDOMINAL LYMPH NODES (HCC): ICD-10-CM

## 2025-07-08 LAB
ALBUMIN SERPL BCG-MCNC: 3.9 G/DL (ref 3.5–5)
ALP SERPL-CCNC: 106 U/L (ref 34–104)
ALT SERPL W P-5'-P-CCNC: 32 U/L (ref 7–52)
ANION GAP SERPL CALCULATED.3IONS-SCNC: 8 MMOL/L (ref 4–13)
AST SERPL W P-5'-P-CCNC: 19 U/L (ref 13–39)
BASOPHILS # BLD AUTO: 0.09 THOUSANDS/ÂΜL (ref 0–0.1)
BASOPHILS NFR BLD AUTO: 1 % (ref 0–1)
BILIRUB SERPL-MCNC: 0.55 MG/DL (ref 0.2–1)
BUN SERPL-MCNC: 12 MG/DL (ref 5–25)
CALCIUM SERPL-MCNC: 8.7 MG/DL (ref 8.4–10.2)
CHLORIDE SERPL-SCNC: 105 MMOL/L (ref 96–108)
CO2 SERPL-SCNC: 28 MMOL/L (ref 21–32)
CREAT SERPL-MCNC: 0.85 MG/DL (ref 0.6–1.3)
EOSINOPHIL # BLD AUTO: 0.6 THOUSAND/ÂΜL (ref 0–0.61)
EOSINOPHIL NFR BLD AUTO: 7 % (ref 0–6)
ERYTHROCYTE [DISTWIDTH] IN BLOOD BY AUTOMATED COUNT: 13 % (ref 11.6–15.1)
GFR SERPL CREATININE-BSD FRML MDRD: 97 ML/MIN/1.73SQ M
GLUCOSE SERPL-MCNC: 92 MG/DL (ref 65–140)
HCT VFR BLD AUTO: 41.3 % (ref 36.5–49.3)
HGB BLD-MCNC: 14 G/DL (ref 12–17)
IMM GRANULOCYTES # BLD AUTO: 0.13 THOUSAND/UL (ref 0–0.2)
IMM GRANULOCYTES NFR BLD AUTO: 2 % (ref 0–2)
LDH SERPL-CCNC: 153 U/L (ref 140–271)
LYMPHOCYTES # BLD AUTO: 1.06 THOUSANDS/ÂΜL (ref 0.6–4.47)
LYMPHOCYTES NFR BLD AUTO: 13 % (ref 14–44)
MCH RBC QN AUTO: 29.4 PG (ref 26.8–34.3)
MCHC RBC AUTO-ENTMCNC: 33.9 G/DL (ref 31.4–37.4)
MCV RBC AUTO: 87 FL (ref 82–98)
MONOCYTES # BLD AUTO: 0.64 THOUSAND/ÂΜL (ref 0.17–1.22)
MONOCYTES NFR BLD AUTO: 8 % (ref 4–12)
NEUTROPHILS # BLD AUTO: 5.79 THOUSANDS/ÂΜL (ref 1.85–7.62)
NEUTS SEG NFR BLD AUTO: 69 % (ref 43–75)
NRBC BLD AUTO-RTO: 0 /100 WBCS
PLATELET # BLD AUTO: 195 THOUSANDS/UL (ref 149–390)
PMV BLD AUTO: 10 FL (ref 8.9–12.7)
POTASSIUM SERPL-SCNC: 4.2 MMOL/L (ref 3.5–5.3)
PROT SERPL-MCNC: 6 G/DL (ref 6.4–8.4)
RBC # BLD AUTO: 4.76 MILLION/UL (ref 3.88–5.62)
SODIUM SERPL-SCNC: 141 MMOL/L (ref 135–147)
WBC # BLD AUTO: 8.31 THOUSAND/UL (ref 4.31–10.16)

## 2025-07-08 PROCEDURE — 80053 COMPREHEN METABOLIC PANEL: CPT

## 2025-07-08 PROCEDURE — 36415 COLL VENOUS BLD VENIPUNCTURE: CPT

## 2025-07-08 PROCEDURE — 83615 LACTATE (LD) (LDH) ENZYME: CPT

## 2025-07-08 PROCEDURE — 85025 COMPLETE CBC W/AUTO DIFF WBC: CPT

## 2025-08-11 ENCOUNTER — APPOINTMENT (OUTPATIENT)
Dept: LAB | Age: 55
End: 2025-08-11
Payer: COMMERCIAL

## 2025-08-11 ENCOUNTER — TRANSCRIBE ORDERS (OUTPATIENT)
Dept: ADMINISTRATIVE | Age: 55
End: 2025-08-11

## 2025-08-11 ENCOUNTER — APPOINTMENT (OUTPATIENT)
Dept: LAB | Age: 55
End: 2025-08-11
Attending: NURSE PRACTITIONER
Payer: COMMERCIAL

## (undated) DEVICE — GLOVE INDICATOR PI UNDERGLOVE SZ 8 BLUE

## (undated) DEVICE — ELECTRODE BLADE MOD E-Z CLEAN 2.5IN 6.4CM -0012M

## (undated) DEVICE — TUBING SUCTION 5MM X 12 FT

## (undated) DEVICE — PLUMEPEN PRO 10FT

## (undated) DEVICE — NEEDLE 25GA X 1 IN SAFETY GLIDE

## (undated) DEVICE — BETHLEHEM UNIVERSAL MINOR GEN: Brand: CARDINAL HEALTH

## (undated) DEVICE — GLOVE SRG BIOGEL 7.5

## (undated) DEVICE — 3M™ DURAPORE™ SURGICAL TAPE 1538-3, 3 INCH X 10 YARD (7,5CM X 9,1M), 4 ROLLS/BOX: Brand: 3M™ DURAPORE™

## (undated) DEVICE — SPECIMEN CONTAINER STERILE PEEL PACK

## (undated) DEVICE — SUT CHROMIC 2-0 SH 27 IN G123H

## (undated) DEVICE — MEDI-VAC YANK SUCT HNDL W/TPRD BULBOUS TIP: Brand: CARDINAL HEALTH

## (undated) DEVICE — SPONGE STICK WITH PVP-I: Brand: KENDALL

## (undated) DEVICE — LUBRICANT SURGILUBE TUBE 4 OZ  FLIP TOP

## (undated) DEVICE — CHLORAPREP HI-LITE 26ML ORANGE

## (undated) DEVICE — 3000CC GUARDIAN II: Brand: GUARDIAN

## (undated) DEVICE — SUT VICRYL 3-0 SH 27 IN J416H

## (undated) DEVICE — DISPOSABLE BRIEF/UNDERWEAR

## (undated) DEVICE — TINCTURE OF BENZOIN SKIN PREP SPRAY IS A SKIN PREP SPRAY THAT ENHANCES THE ADHESION OF TAPE/APPLIANCE WHILE PROTECTING SENSITIVE SKIN FROM ADHESIVES AND BODY FLUIDS.: Brand: TINCTURE OF BENZOIN SPRAY 4OZ US

## (undated) DEVICE — ADHESIVE SKIN HIGH VISCOSITY EXOFIN 1ML

## (undated) DEVICE — NEEDLE 25G X 1 1/2

## (undated) DEVICE — POOLE SUCTION HANDLE: Brand: CARDINAL HEALTH

## (undated) DEVICE — GLOVE INDICATOR PI UNDERGLOVE SZ 7 BLUE

## (undated) DEVICE — BASIC SINGLE BASIN 2-LF: Brand: MEDLINE INDUSTRIES, INC.

## (undated) DEVICE — LIGACLIP MCA MULTIPLE CLIP APPLIERS, 20 MEDIUM CLIPS: Brand: LIGACLIP

## (undated) DEVICE — INTENDED FOR TISSUE SEPARATION, AND OTHER PROCEDURES THAT REQUIRE A SHARP SURGICAL BLADE TO PUNCTURE OR CUT.: Brand: BARD-PARKER SAFETY BLADES SIZE 15, STERILE

## (undated) DEVICE — GAUZE SPONGES,16 PLY: Brand: CURITY

## (undated) DEVICE — SUT VICRYL 3-0 REEL 54 IN J285G

## (undated) DEVICE — SUT MONOCRYL 4-0 PS-2 27 IN Y426H

## (undated) DEVICE — GLOVE SRG BIOGEL ECLIPSE 7

## (undated) DEVICE — THD KIT